# Patient Record
Sex: MALE | Race: OTHER | Employment: OTHER | URBAN - METROPOLITAN AREA
[De-identification: names, ages, dates, MRNs, and addresses within clinical notes are randomized per-mention and may not be internally consistent; named-entity substitution may affect disease eponyms.]

---

## 2017-11-03 ENCOUNTER — APPOINTMENT (OUTPATIENT)
Dept: GENERAL RADIOLOGY | Age: 72
DRG: 249 | End: 2017-11-03
Attending: EMERGENCY MEDICINE
Payer: COMMERCIAL

## 2017-11-03 ENCOUNTER — HOSPITAL ENCOUNTER (INPATIENT)
Age: 72
LOS: 4 days | Discharge: HOME | DRG: 249 | End: 2017-11-07
Attending: STUDENT IN AN ORGANIZED HEALTH CARE EDUCATION/TRAINING PROGRAM | Admitting: SPECIALIST
Payer: COMMERCIAL

## 2017-11-03 DIAGNOSIS — D50.0 IRON DEFICIENCY ANEMIA DUE TO CHRONIC BLOOD LOSS: ICD-10-CM

## 2017-11-03 DIAGNOSIS — I21.4 NSTEMI (NON-ST ELEVATED MYOCARDIAL INFARCTION) (HCC): Primary | ICD-10-CM

## 2017-11-03 DIAGNOSIS — K27.9 PUD (PEPTIC ULCER DISEASE): ICD-10-CM

## 2017-11-03 DIAGNOSIS — E11.9 TYPE 2 DIABETES MELLITUS WITHOUT COMPLICATION, WITHOUT LONG-TERM CURRENT USE OF INSULIN (HCC): ICD-10-CM

## 2017-11-03 DIAGNOSIS — D18.00 HEMANGIOMA: ICD-10-CM

## 2017-11-03 PROBLEM — R07.9 CHEST PAIN: Status: ACTIVE | Noted: 2017-11-03

## 2017-11-03 LAB
ALBUMIN SERPL-MCNC: 3.4 G/DL (ref 3.5–5)
ALBUMIN/GLOB SERPL: 0.9 {RATIO} (ref 1.1–2.2)
ALP SERPL-CCNC: 87 U/L (ref 45–117)
ALT SERPL-CCNC: 24 U/L (ref 12–78)
ANION GAP SERPL CALC-SCNC: 5 MMOL/L (ref 5–15)
APTT PPP: 28.2 SEC (ref 22.1–32.5)
AST SERPL-CCNC: 32 U/L (ref 15–37)
BASOPHILS # BLD: 0 K/UL (ref 0–0.1)
BASOPHILS NFR BLD: 0 % (ref 0–1)
BILIRUB SERPL-MCNC: 0.6 MG/DL (ref 0.2–1)
BUN SERPL-MCNC: 9 MG/DL (ref 6–20)
BUN/CREAT SERPL: 14 (ref 12–20)
CALCIUM SERPL-MCNC: 8.4 MG/DL (ref 8.5–10.1)
CHLORIDE SERPL-SCNC: 103 MMOL/L (ref 97–108)
CK SERPL-CCNC: 132 U/L (ref 39–308)
CO2 SERPL-SCNC: 31 MMOL/L (ref 21–32)
CREAT SERPL-MCNC: 0.65 MG/DL (ref 0.7–1.3)
EOSINOPHIL # BLD: 0.3 K/UL (ref 0–0.4)
EOSINOPHIL NFR BLD: 3 % (ref 0–7)
ERYTHROCYTE [DISTWIDTH] IN BLOOD BY AUTOMATED COUNT: 14.8 % (ref 11.5–14.5)
GLOBULIN SER CALC-MCNC: 4 G/DL (ref 2–4)
GLUCOSE BLD STRIP.AUTO-MCNC: 180 MG/DL (ref 65–100)
GLUCOSE SERPL-MCNC: 134 MG/DL (ref 65–100)
HCT VFR BLD AUTO: 39.2 % (ref 36.6–50.3)
HGB BLD-MCNC: 12.7 G/DL (ref 12.1–17)
INR PPP: 1.1 (ref 0.9–1.1)
LYMPHOCYTES # BLD: 3.5 K/UL (ref 0.8–3.5)
LYMPHOCYTES NFR BLD: 36 % (ref 12–49)
MCH RBC QN AUTO: 28.3 PG (ref 26–34)
MCHC RBC AUTO-ENTMCNC: 32.4 G/DL (ref 30–36.5)
MCV RBC AUTO: 87.5 FL (ref 80–99)
MONOCYTES # BLD: 0.6 K/UL (ref 0–1)
MONOCYTES NFR BLD: 6 % (ref 5–13)
NEUTS SEG # BLD: 5.3 K/UL (ref 1.8–8)
NEUTS SEG NFR BLD: 55 % (ref 32–75)
PLATELET # BLD AUTO: 298 K/UL (ref 150–400)
POTASSIUM SERPL-SCNC: 3.6 MMOL/L (ref 3.5–5.1)
PROT SERPL-MCNC: 7.4 G/DL (ref 6.4–8.2)
PROTHROMBIN TIME: 10.8 SEC (ref 9–11.1)
RBC # BLD AUTO: 4.48 M/UL (ref 4.1–5.7)
SERVICE CMNT-IMP: ABNORMAL
SODIUM SERPL-SCNC: 139 MMOL/L (ref 136–145)
THERAPEUTIC RANGE,PTTT: NORMAL SECS (ref 58–77)
TROPONIN I SERPL-MCNC: 0.67 NG/ML
WBC # BLD AUTO: 9.7 K/UL (ref 4.1–11.1)

## 2017-11-03 PROCEDURE — 82550 ASSAY OF CK (CPK): CPT | Performed by: EMERGENCY MEDICINE

## 2017-11-03 PROCEDURE — 74011250636 HC RX REV CODE- 250/636: Performed by: SPECIALIST

## 2017-11-03 PROCEDURE — 36415 COLL VENOUS BLD VENIPUNCTURE: CPT | Performed by: EMERGENCY MEDICINE

## 2017-11-03 PROCEDURE — 85730 THROMBOPLASTIN TIME PARTIAL: CPT | Performed by: STUDENT IN AN ORGANIZED HEALTH CARE EDUCATION/TRAINING PROGRAM

## 2017-11-03 PROCEDURE — 74011250637 HC RX REV CODE- 250/637: Performed by: STUDENT IN AN ORGANIZED HEALTH CARE EDUCATION/TRAINING PROGRAM

## 2017-11-03 PROCEDURE — 65660000000 HC RM CCU STEPDOWN

## 2017-11-03 PROCEDURE — 74011250637 HC RX REV CODE- 250/637: Performed by: SPECIALIST

## 2017-11-03 PROCEDURE — 85025 COMPLETE CBC W/AUTO DIFF WBC: CPT | Performed by: EMERGENCY MEDICINE

## 2017-11-03 PROCEDURE — 71020 XR CHEST PA LAT: CPT

## 2017-11-03 PROCEDURE — 82962 GLUCOSE BLOOD TEST: CPT

## 2017-11-03 PROCEDURE — 99284 EMERGENCY DEPT VISIT MOD MDM: CPT

## 2017-11-03 PROCEDURE — 85610 PROTHROMBIN TIME: CPT | Performed by: STUDENT IN AN ORGANIZED HEALTH CARE EDUCATION/TRAINING PROGRAM

## 2017-11-03 PROCEDURE — 84484 ASSAY OF TROPONIN QUANT: CPT | Performed by: EMERGENCY MEDICINE

## 2017-11-03 PROCEDURE — 93005 ELECTROCARDIOGRAM TRACING: CPT

## 2017-11-03 PROCEDURE — 80053 COMPREHEN METABOLIC PANEL: CPT | Performed by: EMERGENCY MEDICINE

## 2017-11-03 RX ORDER — HYDROCODONE BITARTRATE AND ACETAMINOPHEN 5; 325 MG/1; MG/1
1 TABLET ORAL
Status: DISCONTINUED | OUTPATIENT
Start: 2017-11-03 | End: 2017-11-07 | Stop reason: HOSPADM

## 2017-11-03 RX ORDER — LORAZEPAM 2 MG/ML
1 INJECTION INTRAMUSCULAR
Status: DISCONTINUED | OUTPATIENT
Start: 2017-11-03 | End: 2017-11-07 | Stop reason: HOSPADM

## 2017-11-03 RX ORDER — MELATONIN
DAILY
COMMUNITY
End: 2017-11-03

## 2017-11-03 RX ORDER — ASPIRIN 325 MG
325 TABLET ORAL ONCE
Status: COMPLETED | OUTPATIENT
Start: 2017-11-03 | End: 2017-11-03

## 2017-11-03 RX ORDER — MORPHINE SULFATE 10 MG/ML
4 INJECTION, SOLUTION INTRAMUSCULAR; INTRAVENOUS
Status: DISCONTINUED | OUTPATIENT
Start: 2017-11-03 | End: 2017-11-07 | Stop reason: HOSPADM

## 2017-11-03 RX ORDER — MELATONIN
1000 DAILY
Status: DISCONTINUED | OUTPATIENT
Start: 2017-11-04 | End: 2017-11-07 | Stop reason: HOSPADM

## 2017-11-03 RX ORDER — METFORMIN HYDROCHLORIDE 500 MG/1
500 TABLET ORAL
COMMUNITY

## 2017-11-03 RX ORDER — DEXTROSE 50 % IN WATER (D50W) INTRAVENOUS SYRINGE
12.5-25 AS NEEDED
Status: DISCONTINUED | OUTPATIENT
Start: 2017-11-03 | End: 2017-11-07 | Stop reason: HOSPADM

## 2017-11-03 RX ORDER — GLIPIZIDE 5 MG/1
5 TABLET, FILM COATED, EXTENDED RELEASE ORAL EVERY EVENING
Status: DISCONTINUED | OUTPATIENT
Start: 2017-11-04 | End: 2017-11-07 | Stop reason: HOSPADM

## 2017-11-03 RX ORDER — GUAIFENESIN 100 MG/5ML
81 LIQUID (ML) ORAL DAILY
Status: DISCONTINUED | OUTPATIENT
Start: 2017-11-04 | End: 2017-11-07 | Stop reason: HOSPADM

## 2017-11-03 RX ORDER — ZOLPIDEM TARTRATE 5 MG/1
5 TABLET ORAL
Status: DISCONTINUED | OUTPATIENT
Start: 2017-11-03 | End: 2017-11-07 | Stop reason: HOSPADM

## 2017-11-03 RX ORDER — INSULIN LISPRO 100 [IU]/ML
INJECTION, SOLUTION INTRAVENOUS; SUBCUTANEOUS
Status: DISCONTINUED | OUTPATIENT
Start: 2017-11-04 | End: 2017-11-07 | Stop reason: HOSPADM

## 2017-11-03 RX ORDER — ENOXAPARIN SODIUM 100 MG/ML
1 INJECTION SUBCUTANEOUS EVERY 12 HOURS
Status: COMPLETED | OUTPATIENT
Start: 2017-11-03 | End: 2017-11-04

## 2017-11-03 RX ORDER — ATORVASTATIN CALCIUM 20 MG/1
20 TABLET, FILM COATED ORAL EVERY EVENING
Status: DISCONTINUED | OUTPATIENT
Start: 2017-11-04 | End: 2017-11-07 | Stop reason: HOSPADM

## 2017-11-03 RX ORDER — SODIUM CHLORIDE 0.9 % (FLUSH) 0.9 %
5-10 SYRINGE (ML) INJECTION AS NEEDED
Status: DISCONTINUED | OUTPATIENT
Start: 2017-11-03 | End: 2017-11-06

## 2017-11-03 RX ORDER — GLIPIZIDE 5 MG/1
5 TABLET, FILM COATED, EXTENDED RELEASE ORAL EVERY EVENING
Status: DISCONTINUED | OUTPATIENT
Start: 2017-11-04 | End: 2017-11-03

## 2017-11-03 RX ORDER — SODIUM CHLORIDE 0.9 % (FLUSH) 0.9 %
5-10 SYRINGE (ML) INJECTION EVERY 8 HOURS
Status: DISCONTINUED | OUTPATIENT
Start: 2017-11-03 | End: 2017-11-06 | Stop reason: HOSPADM

## 2017-11-03 RX ORDER — METFORMIN HYDROCHLORIDE 500 MG/1
500 TABLET ORAL 4 TIMES DAILY
Status: DISCONTINUED | OUTPATIENT
Start: 2017-11-04 | End: 2017-11-05

## 2017-11-03 RX ORDER — SUCRALFATE 1 G/1
1 TABLET ORAL AS NEEDED
COMMUNITY
End: 2017-11-03

## 2017-11-03 RX ORDER — SUCRALFATE 1 G/1
1 TABLET ORAL
Status: DISCONTINUED | OUTPATIENT
Start: 2017-11-03 | End: 2017-11-07 | Stop reason: HOSPADM

## 2017-11-03 RX ORDER — SUCRALFATE 1 G/10ML
1 SUSPENSION ORAL 4 TIMES DAILY
COMMUNITY
End: 2021-02-16

## 2017-11-03 RX ORDER — ACETAMINOPHEN 325 MG/1
650 TABLET ORAL
Status: DISCONTINUED | OUTPATIENT
Start: 2017-11-03 | End: 2017-11-07 | Stop reason: HOSPADM

## 2017-11-03 RX ORDER — MAGNESIUM SULFATE 100 %
4 CRYSTALS MISCELLANEOUS AS NEEDED
Status: DISCONTINUED | OUTPATIENT
Start: 2017-11-03 | End: 2017-11-07 | Stop reason: HOSPADM

## 2017-11-03 RX ORDER — NITROGLYCERIN 0.4 MG/1
0.4 TABLET SUBLINGUAL
Status: DISCONTINUED | OUTPATIENT
Start: 2017-11-03 | End: 2017-11-07 | Stop reason: HOSPADM

## 2017-11-03 RX ADMIN — SUCRALFATE 1 G: 1 TABLET ORAL at 23:56

## 2017-11-03 RX ADMIN — Medication 10 ML: at 22:00

## 2017-11-03 RX ADMIN — ENOXAPARIN SODIUM 80 MG: 80 INJECTION SUBCUTANEOUS at 23:56

## 2017-11-03 RX ADMIN — METFORMIN HYDROCHLORIDE 500 MG: 500 TABLET, FILM COATED ORAL at 23:56

## 2017-11-03 RX ADMIN — GLIPIZIDE 5 MG: 5 TABLET, FILM COATED, EXTENDED RELEASE ORAL at 23:56

## 2017-11-03 RX ADMIN — ASPIRIN 325 MG: 325 TABLET ORAL at 18:46

## 2017-11-03 NOTE — H&P
11/3/2017    Cardiology Consult  Note   [facility] M[facility] , F.A.C.[facility].   --------PCP:-Doe Colon MD   -----Subjective:   Olive Aguilar is a 80 y.o. male   Nice gentleman from [facility] originally   Retired , wife was physician  Very nice family at bedside  DM 2 for many years, has been trying to 15,000 steps a day  Getting for 2 weeks up to walk and progresses from 2/10 to 7/10 substernal chest pain with Boyd's sign  Pain relief with rest     has a past medical history of Anemia; Cataract; Diabetes ([facility] (11/03/2017); Family history of coronary arteriosclerosis; Hemangioma; and PUD (peptic ulcer disease) (1977). family history includes Heart Attack (age of onset: 79) in his father.    ROS- complete multisystem 11 ROS done and reviewed as pertinent - cataracts, no recent bleed but hx for anemia, CP, SOB are pos also    Discussion/Plan/Impression:    Unstable angina with DM2   Positive troponin of NSTEMI  Needs cath on for 12 Noon  Use Bare metal stents due to hx of anemia  Lab Results   Component Value Date/Time    Creatinine 0.65 11/03/2017 04:55 PM      Lab Results   Component Value Date/Time    HGB 12.7 11/03/2017 04:55 PM    on sucralfate post biliroth surgery  One dose of lovenox until we know direction and magnitude of enzymes  Echo in am   Got ASA already  If chest pain at rest then nitrates, beta blocker   Start statin          Past Medical History:   Diagnosis Date    Anemia     Dr Mckayla Lopez found ooze at site of Bilroth anastomosis, was on sucralfate, saw Dr Arnel Briggs Diabetes ([facility] 11/03/2017    on metformin    Family history of coronary arteriosclerosis     Hemangioma     splenectomy-Mccormick    PUD (peptic ulcer disease) 1977    Bilroth 2        ROS-pertinents  negative except as above  The pertinent portions of the medical history,physician and nursing notes, meds,vitals , labs and Ins/Outs,are reviewed in the electronic record. Social History   Substance Use Topics    Smoking status: Never Smoker    Smokeless tobacco: Never Used    Alcohol use No      Family History   Problem Relation Age of Onset    Heart Attack Father 79      Prior to Admission Medications   Prescriptions Last Dose Informant Patient Reported? Taking? GLIPIZIDE PO 11/2/2017 at Unknown time  Yes Yes   Sig: Take  by mouth every evening. Omega-3 Fatty Acids 300 mg cap 11/2/2017 at Unknown time  Yes Yes   Sig: Take  by mouth daily. cholecalciferol (VITAMIN D3) 1,000 unit tablet 11/2/2017 at Unknown time  Yes Yes   Sig: Take  by mouth daily. metFORMIN (GLUCOPHAGE) 500 mg tablet 11/2/2017 at Unknown time  Yes Yes   Sig: Take 500 mg by mouth four (4) times daily (with meals). sucralfate (CARAFATE) 1 gram tablet 11/2/2017 at Unknown time  Yes Yes   Sig: Take 1 g by mouth as needed. Facility-Administered Medications: None     Allergies   Allergen Reactions    Sulfa (Sulfonamide Antibiotics) Hives      Objective:    Physical Exam:   Patient Vitals for the past 12 hrs:   Temp Pulse Resp BP SpO2   11/03/17 1625 97.2 °F (36.2 °C) 71 20 (!) 157/93 97 %   Physical Exam   Blood pressure (!) 157/93, pulse 71, temperature 97.2 °F (36.2 °C), resp. rate 20, height 6' 1\" (1.854 m), weight 187 lb (84.8 kg), SpO2 97 %. Constitutional:  well-developed and well-nourished. No distress. HENT: Head: Normocephalic. Eyes: No scleral icterus. Neck:  Neck supple. No JVD present. No tracheal deviation present. Pulmonary/Chest: Effort normal and breath sounds normal. No stridor. No respiratory distress, wheezes or rales. Cardiovascular: Normal rate, regular rhythm, normal heart sounds . Exam reveals no gallop and no friction rub. No murmur heard. PMI non displaced. Extremities:  no edema. Abdominal:   no abnormal distension. Neurological:  alert and oriented. Coordination seems grossly normal.   Skin: Skin is not cold. No rash noted.  Not diaphoretic. No erythema. Psychiatric:  Grossly normal mood and affect. Behavior appears normal.      Last 24hr Input/Output:  No intake or output data in the 24 hours ending 11/03/17 1912     Data Review:   Recent Results (from the past 24 hour(s))   EKG, 12 LEAD, INITIAL    Collection Time: 11/03/17  4:49 PM   Result Value Ref Range    Ventricular Rate 64 BPM    Atrial Rate 64 BPM    P-R Interval 206 ms    QRS Duration 90 ms    Q-T Interval 418 ms    QTC Calculation (Bezet) 431 ms    Calculated P Axis 75 degrees    Calculated R Axis 19 degrees    Calculated T Axis 52 degrees    Diagnosis Normal sinus rhythm  No previous ECGs available      CBC WITH AUTOMATED DIFF    Collection Time: 11/03/17  4:55 PM   Result Value Ref Range    WBC 9.7 4.1 - 11.1 K/uL    RBC 4.48 4.10 - 5.70 M/uL    HGB 12.7 12.1 - 17.0 g/dL    HCT 39.2 36.6 - 50.3 %    MCV 87.5 80.0 - 99.0 FL    MCH 28.3 26.0 - 34.0 PG    MCHC 32.4 30.0 - 36.5 g/dL    RDW 14.8 (H) 11.5 - 14.5 %    PLATELET 115 853 - 345 K/uL    NEUTROPHILS 55 32 - 75 %    LYMPHOCYTES 36 12 - 49 %    MONOCYTES 6 5 - 13 %    EOSINOPHILS 3 0 - 7 %    BASOPHILS 0 0 - 1 %    ABS. NEUTROPHILS 5.3 1.8 - 8.0 K/UL    ABS. LYMPHOCYTES 3.5 0.8 - 3.5 K/UL    ABS. MONOCYTES 0.6 0.0 - 1.0 K/UL    ABS. EOSINOPHILS 0.3 0.0 - 0.4 K/UL    ABS. BASOPHILS 0.0 0.0 - 0.1 K/UL   METABOLIC PANEL, COMPREHENSIVE    Collection Time: 11/03/17  4:55 PM   Result Value Ref Range    Sodium 139 136 - 145 mmol/L    Potassium 3.6 3.5 - 5.1 mmol/L    Chloride 103 97 - 108 mmol/L    CO2 31 21 - 32 mmol/L    Anion gap 5 5 - 15 mmol/L    Glucose 134 (H) 65 - 100 mg/dL    BUN 9 6 - 20 MG/DL    Creatinine 0.65 (L) 0.70 - 1.30 MG/DL    BUN/Creatinine ratio 14 12 - 20      GFR est AA >60 >60 ml/min/1.73m2    GFR est non-AA >60 >60 ml/min/1.73m2    Calcium 8.4 (L) 8.5 - 10.1 MG/DL    Bilirubin, total 0.6 0.2 - 1.0 MG/DL    ALT (SGPT) 24 12 - 78 U/L    AST (SGOT) 32 15 - 37 U/L    Alk.  phosphatase 87 45 - 117 U/L Protein, total 7.4 6.4 - 8.2 g/dL    Albumin 3.4 (L) 3.5 - 5.0 g/dL    Globulin 4.0 2.0 - 4.0 g/dL    A-G Ratio 0.9 (L) 1.1 - 2.2     TROPONIN I    Collection Time: 11/03/17  4:55 PM   Result Value Ref Range    Troponin-I, Qt. 0.67 (H) <0.05 ng/mL   CK W/ REFLX CKMB    Collection Time: 11/03/17  4:55 PM   Result Value Ref Range     39 - 308 U/L   PROTHROMBIN TIME + INR    Collection Time: 11/03/17  4:55 PM   Result Value Ref Range    INR 1.1 0.9 - 1.1      Prothrombin time 10.8 9.0 - 11.1 sec   PTT    Collection Time: 11/03/17  4:55 PM   Result Value Ref Range    aPTT 28.2 22.1 - 32.5 sec    aPTT, therapeutic range     58.0 - 77.0 SECS      No results found for: CHOL, CHOLX, CHLST, CHOLV, 452498, HDL, LDL, LDLC, DLDLP, TGLX, TRIGL, TRIGP, CHHD, CHHDX   Sandy Martinez MD 11/3/2017

## 2017-11-03 NOTE — ED TRIAGE NOTES
Pt arrives from PCP for LEFT sided chest pain that started 2 weeks ago. Pt reports the pain as burning that radiates up bilateral neck. Pt reports intermittent SOB.

## 2017-11-03 NOTE — IP AVS SNAPSHOT
[address] 
[phone] Patient: Evonne Henriquez MRN: [id number] :1945 My Medications TAKE these medications as instructed Instructions Each Dose to Equal  
 Morning Noon Evening Bedtime  
 aspirin 81 mg chewable tablet Your last dose was: Your next dose is: Take 1 Tab by mouth daily. 81 mg  
    
   
   
   
  
 atorvastatin 20 mg tablet Commonly known as:  LIPITOR Your last dose was: Your next dose is: Take 1 Tab by mouth every evening. 20 mg  
    
   
   
   
  
 clopidogrel 75 mg Tab Commonly known as:  PLAVIX Your last dose was: Your next dose is: Take 1 Tab by mouth daily. 75 mg  
    
   
   
   
  
 GLIPIZIDE PO Your last dose was: Your next dose is: Take 5 mg by mouth three (3) times daily. 5 mg  
    
   
   
   
  
 metFORMIN 500 mg tablet Commonly known as:  GLUCOPHAGE Your last dose was: Your next dose is: Take 500 mg by mouth four (4) times daily (with meals). 500 mg  
    
   
   
   
  
 nitroglycerin 0.4 mg SL tablet Commonly known as:  NITROSTAT Your last dose was: Your next dose is:    
   
   
 1 Tab by SubLINGual route every five (5) minutes as needed for Chest Pain. 0.4 mg Omega-3 Fatty Acids 300 mg Cap Your last dose was: Your next dose is: Take  by mouth daily. sucralfate 100 mg/mL suspension Commonly known as:  Radha Chauncey Your last dose was: Your next dose is: Take 1 g by mouth four (4) times daily. 1 g Where to Get Your Medications Information on where to get these meds will be given to you by the nurse or doctor. ! Ask your nurse or doctor about these medications atorvastatin 20 mg tablet  
 clopidogrel 75 mg Tab  
 nitroglycerin 0.4 mg SL tablet

## 2017-11-03 NOTE — ED PROVIDER NOTES
HPI Comments: 80 y.o. male with past medical history significant for DM and PUD who presents to the ED with chief complaint of chest pain. Patient reports occasional \"burning\" mid chest pain that radiates to his right chest and neck and SOB with walking with onset ~2 weeks; describes his pain as a \"7-8/10\" at onset. Patient reports needing to stop walking due to his pain; reports his pain improves with rest. Patient reports being seen by his PCP for his symptoms \"earlier today,\" reports being referred to the ED for further evaluation. Patient reports a history of DM. Patient denies a history of HTN. Patient reports a history of a partial gastrectomy due to an ulcer ~40 years ago. Patient reports a history of a splenectomy due to a hemangioma ~20 years ago. Patient reports a history of anemia; reports being diagnosed with \"internal microbleeding\" near the site of his splenectomy via a pill cam ~14 years ago. Patient denies fever, cough, abdominal pain, bleeding, and melena. There are no other acute medical concerns at this time. PCP: Aixa Nelson MD    Note written by All Sims, as dictated by Alyssa Stanley MD 5:31 PM     The history is provided by the patient. Past Medical History:   Diagnosis Date    Diabetes ([facility]     PUD (peptic ulcer disease)        Past Surgical History:   Procedure Laterality Date    HX OTHER SURGICAL      splenectomy         History reviewed. No pertinent family history. Social History     Social History    Marital status:      Spouse name: N/A    Number of children: N/A    Years of education: N/A     Occupational History    Not on file.      Social History Main Topics    Smoking status: Never Smoker    Smokeless tobacco: Never Used    Alcohol use No    Drug use: No    Sexual activity: Not on file     Other Topics Concern    Not on file     Social History Narrative    No narrative on file         ALLERGIES: Sulfa (sulfonamide antibiotics)    Review of Systems   Constitutional: Negative for chills and fever. HENT: Negative for sore throat. Eyes: Negative for pain. Respiratory: Positive for shortness of breath. Negative for cough. Cardiovascular: Positive for chest pain. Gastrointestinal: Negative for abdominal pain, blood in stool and vomiting. Genitourinary: Negative for dysuria. Skin: Negative for rash. Neurological: Negative for syncope and headaches. Psychiatric/Behavioral: Negative for confusion. All other systems reviewed and are negative. Vitals:    11/03/17 1625   BP: (!) 157/93   Pulse: 71   Resp: 20   Temp: 97.2 °F (36.2 °C)   SpO2: 97%   Weight: 84.8 kg (187 lb)   Height: 6' 1\" (1.854 m)            Physical Exam   Constitutional: He is oriented to person, place, and time. He appears well-developed. No distress. HENT:   Head: Normocephalic and atraumatic. Eyes: Conjunctivae and EOM are normal.   Neck: Normal range of motion. Neck supple. Cardiovascular: Normal rate, regular rhythm and normal heart sounds. No murmur heard. Pulmonary/Chest: Effort normal and breath sounds normal. No respiratory distress. Abdominal: Soft. Bowel sounds are normal. He exhibits no distension. There is no tenderness. There is no rebound. Musculoskeletal: Normal range of motion. He exhibits no edema or tenderness. Neurological: He is alert and oriented to person, place, and time. No cranial nerve deficit. He exhibits normal muscle tone. Coordination normal.   Skin: Skin is warm and dry. Nursing note and vitals reviewed.   Note written by All Ramirez, as dictated by Esequiel Smith MD 5:42 PM      MDM  Number of Diagnoses or Management Options  NSTEMI (non-ST elevated myocardial infarction) [facility]): new and requires workup     Amount and/or Complexity of Data Reviewed  Clinical lab tests: ordered and reviewed  Tests in the radiology section of CPT®: ordered and reviewed  Tests in the medicine section of CPT®: ordered  Obtain history from someone other than the patient: yes  Discuss the patient with other providers: yes  Independent visualization of images, tracings, or specimens: yes    Risk of Complications, Morbidity, and/or Mortality  Presenting problems: high  Diagnostic procedures: high  Management options: high    Critical Care  Total time providing critical care: 30-74 minutes (Total critical care time spend exclusive of procedures:  30 minutes.  )    ED Course       Procedures     ED EKG interpretation:  Rhythm: normal sinus rhythm; and regular . Rate (approx.): 64 bpm; Axis: normal; ST/T wave: normal; normal intervals; No STEMI. Note written by All Caba, as dictated by Conchis Bowden MD 4:49 PM    CONSULT NOTE:  5:57 PM Conchis Bowden MD spoke with Dr. Melina Phillips MD, Consult for Cardiology. Discussed available diagnostic tests and clinical findings. Provider is in agreement with care plans as outlined. Dr. Melina Phillips MD will see the patient. PROGRESS NOTE:  7:06 PM  Dr. Melina Phillips MD has evaluated the patient. He will admit the patient to their service.

## 2017-11-03 NOTE — IP AVS SNAPSHOT
[address] [facility] [address] [place] [address] 
[phone] Patient: Jenny Cuevas MRN: [id number] :1945 About your hospitalization You were admitted on:  November 3, 2017 You last received care in the:  [facility] You were discharged on:  2017 Why you were hospitalized Your primary diagnosis was:  Not on File Your diagnoses also included:  Chest Pain Things You Need To Do (next 8 weeks)  Follow up with Pamella Rangel, [facility] follow up PCP appointment on Thursday, 17 @ 10:15 a.m. Phone:  [phone] Where:  [address], [address], [address]  ESTABLISHED PATIENT with Robles Cosme PA-C at  2:20 PM  
Where:  [address] ([facility]) Discharge Orders None A check argentina indicates which time of day the medication should be taken. My Medications TAKE these medications as instructed Instructions Each Dose to Equal  
 Morning Noon Evening Bedtime  
 aspirin 81 mg chewable tablet Your last dose was: Your next dose is: Take 1 Tab by mouth daily. 81 mg  
    
   
   
   
  
 atorvastatin 20 mg tablet Commonly known as:  LIPITOR Your last dose was: Your next dose is: Take 1 Tab by mouth every evening. 20 mg  
    
   
   
   
  
 clopidogrel 75 mg Tab Commonly known as:  PLAVIX Your last dose was: Your next dose is: Take 1 Tab by mouth daily. 75 mg  
    
   
   
   
  
 GLIPIZIDE PO Your last dose was: Your next dose is: Take 5 mg by mouth three (3) times daily. 5 mg  
    
   
   
   
  
 metFORMIN 500 mg tablet Commonly known as:  GLUCOPHAGE Your last dose was: Your next dose is: Take 500 mg by mouth four (4) times daily (with meals). 500 mg  
    
   
   
   
  
 nitroglycerin 0.4 mg SL tablet Commonly known as:  NITROSTAT Your last dose was: Your next dose is:    
   
   
 1 Tab by SubLINGual route every five (5) minutes as needed for Chest Pain. 0.4 mg Omega-3 Fatty Acids 300 mg Cap Your last dose was: Your next dose is: Take  by mouth daily. sucralfate 100 mg/mL suspension Commonly known as:  Stanislav Garbe Your last dose was: Your next dose is: Take 1 g by mouth four (4) times daily. 1 g Where to Get Your Medications Information on where to get these meds will be given to you by the nurse or doctor. ! Ask your nurse or doctor about these medications  
  atorvastatin 20 mg tablet  
 clopidogrel 75 mg Tab  
 nitroglycerin 0.4 mg SL tablet Discharge Instructions None [employer] Announcement We are excited to announce that we are making your provider's discharge notes available to you in [employer]. You will see these notes when they are completed and signed by the physician that discharged you from your recent hospital stay. If you have any questions or concerns about any information you see in [employer], please call the Health Information Department where you were seen or reach out to your Primary Care Provider for more information about your plan of care. Introducing [facility]! [employer] introduces [employer] patient portal. Now you can access parts of your medical record, email your doctor's office, and request medication refills online. 1. In your internet browser, go to [email or web address] 2. Click on the First Time User? Click Here link in the Sign In box. You will see the New Member Sign Up page. 3. Enter your [employer] Access Code exactly as it appears below. You will not need to use this code after youve completed the sign-up process. If you do not sign up before the expiration date, you must request a new code. · [employer] Access Code: [email or web address] Expires: 2/4/2018  7:12 AM 
 
4. Enter the last four digits of your Social Security Number (xxxx) and Date of Birth (mm/dd/yyyy) as indicated and click Submit. You will be taken to the next sign-up page. 5. Create a [employer] ID. This will be your [employer] login ID and cannot be changed, so think of one that is secure and easy to remember. 6. Create a [employer] password. You can change your password at any time. 7. Enter your Password Reset Question and Answer. This can be used at a later time if you forget your password. 8. Enter your e-mail address. You will receive e-mail notification when new information is available in [address] 9. Click Sign Up. You can now view and download portions of your medical record. 10. Click the Download Summary menu link to download a portable copy of your medical information. If you have questions, please visit the Frequently Asked Questions section of the [employer] website. Remember, [employer] is NOT to be used for urgent needs. For medical emergencies, dial 911. Now available from your iPhone and Android! Unresulted Labs-Please follow up with your PCP about these lab tests Order Current Status ECG RHYTHM ANALYSIS ADULT Preliminary result Providers Seen During Your Hospitalization Provider Specialty Primary office phone Dania Flower MD Emergency Medicine [phone] Nathalie Wiggins MD Cardiology [phone] Immunizations Administered for This Admission Name Date Influenza Vaccine (Quad) PF 11/7/2017 Your Primary Care Physician (PCP) Primary Care Physician Office Phone Office Fax Cinthia Colvin [phone] You are allergic to the following Allergen Reactions Sulfa (Sulfonamide Antibiotics) Hives Recent Documentation Height Weight BMI Smoking Status 1.854 m 79.8 kg 23.22 kg/m2 Never Smoker Emergency Contacts Name Discharge Info Relation Home Work Mobile Katie Pearson DISCHARGE CAREGIVER [3] Spouse [3] [phone] Nicko Stubbs CAREGIVER [3] Daughter [21] [phone] Patient Belongings The following personal items are in your possession at time of discharge: 
  Dental Appliances: None  Visual Aid: None      Home Medications: None   Jewelry: With patient  Clothing: With patient    Other Valuables: Cell Phone Please provide this summary of care documentation to your next provider. Signatures-by signing, you are acknowledging that this After Visit Summary has been reviewed with you and you have received a copy. Patient Signature:  ____________________________________________________________ Date:  ____________________________________________________________  
  
Kamaljit Kj Provider Signature:  ____________________________________________________________ Date:  ____________________________________________________________

## 2017-11-04 LAB
ATRIAL RATE: 64 BPM
CALCULATED P AXIS, ECG09: 75 DEGREES
CALCULATED R AXIS, ECG10: 19 DEGREES
CALCULATED T AXIS, ECG11: 52 DEGREES
CHOLEST SERPL-MCNC: 212 MG/DL
DIAGNOSIS, 93000: NORMAL
EST. AVERAGE GLUCOSE BLD GHB EST-MCNC: 189 MG/DL
GLUCOSE BLD STRIP.AUTO-MCNC: 101 MG/DL (ref 65–100)
GLUCOSE BLD STRIP.AUTO-MCNC: 101 MG/DL (ref 65–100)
GLUCOSE BLD STRIP.AUTO-MCNC: 105 MG/DL (ref 65–100)
GLUCOSE BLD STRIP.AUTO-MCNC: 109 MG/DL (ref 65–100)
GLUCOSE BLD STRIP.AUTO-MCNC: 87 MG/DL (ref 65–100)
HBA1C MFR BLD: 8.2 % (ref 4.2–6.3)
HDLC SERPL-MCNC: 62 MG/DL
HDLC SERPL: 3.4 {RATIO} (ref 0–5)
LDLC SERPL CALC-MCNC: 131.4 MG/DL (ref 0–100)
LIPID PROFILE,FLP: ABNORMAL
P-R INTERVAL, ECG05: 206 MS
Q-T INTERVAL, ECG07: 418 MS
QRS DURATION, ECG06: 90 MS
QTC CALCULATION (BEZET), ECG08: 431 MS
SERVICE CMNT-IMP: ABNORMAL
SERVICE CMNT-IMP: NORMAL
TRIGL SERPL-MCNC: 93 MG/DL (ref ?–150)
TROPONIN I SERPL-MCNC: 0.67 NG/ML
VENTRICULAR RATE, ECG03: 64 BPM
VLDLC SERPL CALC-MCNC: 18.6 MG/DL

## 2017-11-04 PROCEDURE — 93306 TTE W/DOPPLER COMPLETE: CPT

## 2017-11-04 PROCEDURE — 82962 GLUCOSE BLOOD TEST: CPT

## 2017-11-04 PROCEDURE — 83036 HEMOGLOBIN GLYCOSYLATED A1C: CPT | Performed by: SPECIALIST

## 2017-11-04 PROCEDURE — 93306 TTE W/DOPPLER COMPLETE: CPT | Performed by: SPECIALIST

## 2017-11-04 PROCEDURE — 74011250637 HC RX REV CODE- 250/637: Performed by: SPECIALIST

## 2017-11-04 PROCEDURE — 36415 COLL VENOUS BLD VENIPUNCTURE: CPT | Performed by: SPECIALIST

## 2017-11-04 PROCEDURE — 65660000000 HC RM CCU STEPDOWN

## 2017-11-04 PROCEDURE — 80061 LIPID PANEL: CPT | Performed by: SPECIALIST

## 2017-11-04 PROCEDURE — 74011250636 HC RX REV CODE- 250/636: Performed by: SPECIALIST

## 2017-11-04 PROCEDURE — 84484 ASSAY OF TROPONIN QUANT: CPT | Performed by: SPECIALIST

## 2017-11-04 RX ADMIN — METFORMIN HYDROCHLORIDE 500 MG: 500 TABLET, FILM COATED ORAL at 17:20

## 2017-11-04 RX ADMIN — Medication 10 ML: at 06:00

## 2017-11-04 RX ADMIN — SUCRALFATE 1 G: 1 TABLET ORAL at 17:21

## 2017-11-04 RX ADMIN — METFORMIN HYDROCHLORIDE 500 MG: 500 TABLET, FILM COATED ORAL at 09:07

## 2017-11-04 RX ADMIN — METFORMIN HYDROCHLORIDE 500 MG: 500 TABLET, FILM COATED ORAL at 21:02

## 2017-11-04 RX ADMIN — VITAMIN D, TAB 1000IU (100/BT) 1000 UNITS: 25 TAB at 09:07

## 2017-11-04 RX ADMIN — SUCRALFATE 1 G: 1 TABLET ORAL at 10:48

## 2017-11-04 RX ADMIN — ASPIRIN 81 MG 81 MG: 81 TABLET ORAL at 09:07

## 2017-11-04 RX ADMIN — Medication 10 ML: at 22:00

## 2017-11-04 RX ADMIN — METFORMIN HYDROCHLORIDE 500 MG: 500 TABLET, FILM COATED ORAL at 12:52

## 2017-11-04 RX ADMIN — ATORVASTATIN CALCIUM 20 MG: 20 TABLET, FILM COATED ORAL at 17:21

## 2017-11-04 RX ADMIN — Medication 1 CAPSULE: at 09:07

## 2017-11-04 RX ADMIN — SUCRALFATE 1 G: 1 TABLET ORAL at 21:02

## 2017-11-04 RX ADMIN — GLIPIZIDE 5 MG: 5 TABLET, FILM COATED, EXTENDED RELEASE ORAL at 17:20

## 2017-11-04 RX ADMIN — SUCRALFATE 1 G: 1 TABLET ORAL at 06:46

## 2017-11-04 RX ADMIN — ENOXAPARIN SODIUM 80 MG: 80 INJECTION SUBCUTANEOUS at 09:07

## 2017-11-04 RX ADMIN — Medication 10 ML: at 14:00

## 2017-11-04 NOTE — PROGRESS NOTES
[facility]  -Progress Note     Evaneosmarcel Crowe [phone]   11/4/2017      Kristen Diop M.D. , F.A.C.C.   --------PCP:-Doe Carmona MD   -----Subjective:   . Marshall Reasoner Marshall Reasoner Marshall Reasoner Ruby Rolle is a 80 y.o. male   Comfortable   Resting comfortably  No complaints  Denies chest pain, heart palpitations , increasing edema, pre-syncope or shortness of breath at rest   No problems overnight, rhythm and hemodynamics stable -see vitals below     Patient Vitals for the past 12 hrs:   Temp Pulse Resp BP SpO2   11/04/17 0355 97.9 °F (36.6 °C) (!) 59 17 125/63 97 %   11/03/17 2258 97.4 °F (36.3 °C) 64 18 133/68 96 %   11/03/17 2119 - - - - 96 %   11/03/17 2109 97.3 °F (36.3 °C) 62 16 142/69 95 %   11/03/17 2000 - 69 15 140/69 97 %   11/03/17 1945 - 62 15 148/79 97 %   11/03/17 1917 - 63 16 157/85 99 %        Discussion/Plans/Recs   [place],NSTEMI with recent increasing crescendo chest pain CCS 3  Troponin second is flat  Wife concerned over metformin and lovenox, I attempted last night to speak with her by phone from floor, but she left and told nurse she was fine, patient this am reports he is fine with plans  Will continue metformin now until Sunday am  Will not need lovenox beyond the ordered two doses if enzymes remains flat and pt is pain free  DM2-SSi if needed, continue glyburide  Bp stable  ASA, statin, cath on Monday at noon  LDL today is 131 -have already started statin     Cardiac Studies/Hx:  No specialty comments available.          Past Medical History:   Diagnosis Date    Anemia     Dr Fran Rowan found ooze at site of Bilroth anastomosis, was on sucralfate, saw Dr Fly Varghese Diabetes [facility]) 11/03/2017    on metformin    Family history of coronary arteriosclerosis     Hemangioma     splenectomy-Mccormick    PUD (peptic ulcer disease) 1977    Bilroth 2      ROS-pertinents  negative except as above  The pertinent portions of the medical history,physician and nursing notes, meds,vitals , labs and Ins/Outs,are reviewed in the electronic record.     Results for orders placed or performed during the hospital encounter of 11/03/17   EKG, 12 LEAD, INITIAL   Result Value Ref Range    Ventricular Rate 64 BPM    Atrial Rate 64 BPM    P-R Interval 206 ms    QRS Duration 90 ms    Q-T Interval 418 ms    QTC Calculation (Bezet) 431 ms    Calculated P Axis 75 degrees    Calculated R Axis 19 degrees    Calculated T Axis 52 degrees    Diagnosis       Normal sinus rhythm  No previous ECGs available  Confirmed by Zeke Cabrera MD. ([id number]) on 11/4/2017 4:22:13 AM        Vitals:    11/03/17 2109 11/03/17 2119 11/03/17 2258 11/04/17 0355   BP: 142/69  133/68 125/63   BP 1 Location: Right arm  Right arm Right arm   BP Patient Position: At rest  At rest At rest   Pulse: 62  64 (!) 59   Resp: 16  18 17   Temp: 97.3 °F (36.3 °C)  97.4 °F (36.3 °C) 97.9 °F (36.6 °C)   SpO2: 95% 96% 96% 97%   Weight: 184 lb 4.9 oz (83.6 kg)   184 lb 4.9 oz (83.6 kg)   Height:           Objective:    Physical Exam:   Patient Vitals for the past 12 hrs:   Temp Pulse Resp BP SpO2   11/04/17 0355 97.9 °F (36.6 °C) (!) 59 17 125/63 97 %   11/03/17 2258 97.4 °F (36.3 °C) 64 18 133/68 96 %   11/03/17 2119 - - - - 96 %   11/03/17 2109 97.3 °F (36.3 °C) 62 16 142/69 95 %   11/03/17 2000 - 69 15 140/69 97 %   11/03/17 1945 - 62 15 148/79 97 %   11/03/17 1917 - 63 16 157/85 99 %      General:  alert, cooperative, no distress, appears stated age   ENT, Neck:  no jvd   Chest Wall: inspection normal - no chest wall deformities or tenderness, respiratory effort normal   Lung: clear to auscultation bilaterally   Heart:  normal rate, regular rhythm, normal S1, S2, no murmurs, rubs, clicks or gallops   Abdomen: nondistended   Extremities: extremities normal, atraumatic, no cyanosis or edema     Last 24hr Input/Output:  No intake or output data in the 24 hours ending 11/04/17 6731     Data Review:   Recent Results (from the past 24 hour(s)) EKG, 12 LEAD, INITIAL    Collection Time: 11/03/17  4:49 PM   Result Value Ref Range    Ventricular Rate 64 BPM    Atrial Rate 64 BPM    P-R Interval 206 ms    QRS Duration 90 ms    Q-T Interval 418 ms    QTC Calculation (Bezet) 431 ms    Calculated P Axis 75 degrees    Calculated R Axis 19 degrees    Calculated T Axis 52 degrees    Diagnosis       Normal sinus rhythm  No previous ECGs available  Confirmed by Chilo White MD. ([id number]) on 11/4/2017 4:22:13 AM     CBC WITH AUTOMATED DIFF    Collection Time: 11/03/17  4:55 PM   Result Value Ref Range    WBC 9.7 4.1 - 11.1 K/uL    RBC 4.48 4.10 - 5.70 M/uL    HGB 12.7 12.1 - 17.0 g/dL    HCT 39.2 36.6 - 50.3 %    MCV 87.5 80.0 - 99.0 FL    MCH 28.3 26.0 - 34.0 PG    MCHC 32.4 30.0 - 36.5 g/dL    RDW 14.8 (H) 11.5 - 14.5 %    PLATELET 244 143 - 607 K/uL    NEUTROPHILS 55 32 - 75 %    LYMPHOCYTES 36 12 - 49 %    MONOCYTES 6 5 - 13 %    EOSINOPHILS 3 0 - 7 %    BASOPHILS 0 0 - 1 %    ABS. NEUTROPHILS 5.3 1.8 - 8.0 K/UL    ABS. LYMPHOCYTES 3.5 0.8 - 3.5 K/UL    ABS. MONOCYTES 0.6 0.0 - 1.0 K/UL    ABS. EOSINOPHILS 0.3 0.0 - 0.4 K/UL    ABS. BASOPHILS 0.0 0.0 - 0.1 K/UL   METABOLIC PANEL, COMPREHENSIVE    Collection Time: 11/03/17  4:55 PM   Result Value Ref Range    Sodium 139 136 - 145 mmol/L    Potassium 3.6 3.5 - 5.1 mmol/L    Chloride 103 97 - 108 mmol/L    CO2 31 21 - 32 mmol/L    Anion gap 5 5 - 15 mmol/L    Glucose 134 (H) 65 - 100 mg/dL    BUN 9 6 - 20 MG/DL    Creatinine 0.65 (L) 0.70 - 1.30 MG/DL    BUN/Creatinine ratio 14 12 - 20      GFR est AA >60 >60 ml/min/1.73m2    GFR est non-AA >60 >60 ml/min/1.73m2    Calcium 8.4 (L) 8.5 - 10.1 MG/DL    Bilirubin, total 0.6 0.2 - 1.0 MG/DL    ALT (SGPT) 24 12 - 78 U/L    AST (SGOT) 32 15 - 37 U/L    Alk.  phosphatase 87 45 - 117 U/L    Protein, total 7.4 6.4 - 8.2 g/dL    Albumin 3.4 (L) 3.5 - 5.0 g/dL    Globulin 4.0 2.0 - 4.0 g/dL    A-G Ratio 0.9 (L) 1.1 - 2.2     TROPONIN I    Collection Time: 11/03/17  4:55 PM Result Value Ref Range    Troponin-I, Qt. 0.67 (H) <0.05 ng/mL   CK W/ REFLX CKMB    Collection Time: 11/03/17  4:55 PM   Result Value Ref Range     39 - 308 U/L   PROTHROMBIN TIME + INR    Collection Time: 11/03/17  4:55 PM   Result Value Ref Range    INR 1.1 0.9 - 1.1      Prothrombin time 10.8 9.0 - 11.1 sec   PTT    Collection Time: 11/03/17  4:55 PM   Result Value Ref Range    aPTT 28.2 22.1 - 32.5 sec    aPTT, therapeutic range     58.0 - 77.0 SECS   GLUCOSE, POC    Collection Time: 11/03/17  9:15 PM   Result Value Ref Range    Glucose (POC) 180 (H) 65 - 100 mg/dL    Performed by Gillian Sibley    HEMOGLOBIN A1C WITH EAG    Collection Time: 11/04/17 12:41 AM   Result Value Ref Range    Hemoglobin A1c 8.2 (H) 4.2 - 6.3 %    Est. average glucose 189 mg/dL   TROPONIN I    Collection Time: 11/04/17 12:53 AM   Result Value Ref Range    Troponin-I, Qt. 0.67 (H) <0.05 ng/mL   LIPID PANEL    Collection Time: 11/04/17 12:55 AM   Result Value Ref Range    LIPID PROFILE          Cholesterol, total 212 (H) <200 MG/DL    Triglyceride 93 <150 MG/DL    HDL Cholesterol 62 MG/DL    LDL, calculated 131.4 (H) 0 - 100 MG/DL    VLDL, calculated 18.6 MG/DL    CHOL/HDL Ratio 3.4 0 - 5.0        Bhavik Moncada MD 11/4/2017

## 2017-11-04 NOTE — PROGRESS NOTES
Problem: Unstable angina/NSTEMI: Day of Admission/Day 1  Goal: Psychosocial  Outcome: Progressing Towards Goal  Patient has no complaints of pain, anxiety, or shortness of breath. Will continue to monitor closely. Bedside and Verbal shift change report given to Kwasi Valdez (oncoming nurse) by Dar Taylor (offgoing nurse). Report included the following information SBAR, Kardex, Intake/Output, Recent Results and Cardiac Rhythm NSR.

## 2017-11-04 NOTE — PROGRESS NOTES
Pt received. Up at [facility], duel assessment showed no skin damage whatsoever. Done with Lysndsy. Pt has slight language barrier, speaks english well, heavy accent. Pt family slightly vigilant. Demanded to speak to Doc, he was paged and was willing to speak to her, wife and daughter had already left. Pt eventually accepted explanations for medications due. Informed pt taking at home meds without clearing with pharmacy is prohibited.   Stephanie Dago

## 2017-11-04 NOTE — PROGRESS NOTES
Bedside, Verbal and Written shift change report given to Rosa Trevino (oncoming nurse) by Ceasar Ro (offgoing nurse). Report included the following information SBAR, Kardex, Procedure Summary, Intake/Output, MAR, Recent Results and Med Rec Status   Leonides April.

## 2017-11-04 NOTE — PROGRESS NOTES
Admission Medication Reconciliation:    Information obtained from: Wife (physician), patient, [employer]    Significant PMH/Disease States:   Past Medical History:   Diagnosis Date    Anemia     Dr Melody Melendez found ooze at site of Bilroth anastomosis, was on sucralfate, [facility] 11/03/2017    on metformin    Family history of coronary arteriosclerosis     Hemangioma     splenectomy-Mccormick    PUD (peptic ulcer disease) 1977    Bilroth 2       Chief Complaint for this Admission:  Chest pain    Allergies:  Sulfa (sulfonamide antibiotics)    Prior to Admission Medications:   Prior to Admission Medications   Prescriptions Last Dose Informant Patient Reported? Taking? GLIPIZIDE PO 11/3/2017 at Unknown time  Yes Yes   Sig: Take 5 mg by mouth three (3) times daily. Omega-3 Fatty Acids 300 mg cap 11/2/2017 at Unknown time  Yes Yes   Sig: Take  by mouth daily. metFORMIN (GLUCOPHAGE) 500 mg tablet 11/2/2017 at Unknown time  Yes Yes   Sig: Take 500 mg by mouth four (4) times daily (with meals). sucralfate (CARAFATE) 100 mg/mL suspension 10/27/2017 at Unknown time  Yes Yes   Sig: Take 1 g by mouth four (4) times daily. Facility-Administered Medications: None         Comments/Recommendations: Wife (Galina Cruz) is an excellent historian, as is patient. Allergies were reviewed and confirmed as SULFA which causes HIVES. Please note:  1. Glipizide: patient titrates according to his blood sugar levels (\"sometimes once daily, sometimes twice, maybe more\")  2. Sucralfate: patient has a long history of anemia and bleeding, GI surgeries many years ago. Sucralfate was prescribed to help   \"prevent anemia. \" Because he is very concerned about his blood sugar, he bases decisions on how much sucralfate to take based on blood sugar (\"if too high I don't take because it has sugar in it\"). 3. Metformin: prescription is to take 1 AM, 1 PM, 2 QHS.  Patient has opted to take 1 tab QID as \" I know what is best for my blood sugar. \"  4. Per wife, Patient occasionally takes ferrous sulfate, cholecalciferol and O3FA but not \"always regularly\". Left on list for completeness. Revised:  1. Glipizide (reflects prescription, please see note for how he takes)  2. Sucralfate (from tablet to liquid)    Attempting to reach prescriber to update Raulito Metzger, Cardiology). Thank you for allowing me to participate in the care of your patient.     Jem Salcedo PharmD, RN #[id number]

## 2017-11-04 NOTE — ED NOTES
TRANSFER - OUT REPORT:    Verbal report given to Guanakito Ya (name) [facility] (unit) for routine progression of care       Report consisted of patients Situation, Background, Assessment and   Recommendations(SBAR). Information from the following report(s) SBAR and ED Summary was reviewed with the receiving nurse. Lines:   Peripheral IV 11/03/17 Left Antecubital (Active)   Site Assessment Clean, dry, & intact 11/3/2017  4:59 PM   Phlebitis Assessment 0 11/3/2017  4:59 PM   Infiltration Assessment 0 11/3/2017  4:59 PM   Dressing Status Clean, dry, & intact 11/3/2017  4:59 PM   Dressing Type Tape;Transparent 11/3/2017  4:59 PM   Hub Color/Line Status Pink;Flushed;Patent 11/3/2017  4:59 PM   Action Taken Blood drawn 11/3/2017  4:59 PM        Opportunity for questions and clarification was provided.       Patient transported with:   Moku

## 2017-11-04 NOTE — CARDIO/PULMONARY
Cardiac Rehab: MI education folder, with catheterization brochure, to bedside of Christiano Pearson. Wife at bedside. Pt is for a catheterization on Monday. Wife questions echo today. Echo is pending, deferred to medical team.                                               Educated using teach back method. Reviewed MI diagnosis definition and purpose of intervention. Discussed risk factors for CAD to include the following: family history,  Diabetes. Discussed Heart Healthy/Low Sodium (2000 mg) diet. He is very attentive to his diet. Discussed follow up appointments with cardiologist. Emphasized the value of cardiac rehab. Discussed Cardiac Rehab Program format, benefits, and encouraged enrollment to assist with risk modification and management. He states he is very unhappy if he cannot walk as he walks everyday and says it helps with keeping his sugars level. He asked several questions about cardiac rehab. Reviewed relaxation methods of deep breathing as pt says he is just focused on Monday's procedure and going over it on his mind. Jenny Stands and his wife verbalized understanding with questions answered.   Lorinda Dandy, RN

## 2017-11-04 NOTE — PROGRESS NOTES
Problem: Diabetes Self-Management  Goal: *Incorporating physical activity into lifestyle  Outcome: Progressing Towards Goal  Pt walks frequently with wife. Comments: Pt understands importance of excersize and makes regular attempts to excersize to help maintain therapeutic BS levels.   Mauricio Khan

## 2017-11-05 LAB
GLUCOSE BLD STRIP.AUTO-MCNC: 120 MG/DL (ref 65–100)
GLUCOSE BLD STRIP.AUTO-MCNC: 129 MG/DL (ref 65–100)
GLUCOSE BLD STRIP.AUTO-MCNC: 135 MG/DL (ref 65–100)
GLUCOSE BLD STRIP.AUTO-MCNC: 146 MG/DL (ref 65–100)
SERVICE CMNT-IMP: ABNORMAL

## 2017-11-05 PROCEDURE — 82962 GLUCOSE BLOOD TEST: CPT

## 2017-11-05 PROCEDURE — 74011250637 HC RX REV CODE- 250/637: Performed by: SPECIALIST

## 2017-11-05 PROCEDURE — 65660000000 HC RM CCU STEPDOWN

## 2017-11-05 RX ADMIN — Medication 10 ML: at 21:27

## 2017-11-05 RX ADMIN — Medication 10 ML: at 06:00

## 2017-11-05 RX ADMIN — NITROGLYCERIN 0.4 MG: 0.4 TABLET SUBLINGUAL at 09:10

## 2017-11-05 RX ADMIN — ASPIRIN 81 MG 81 MG: 81 TABLET ORAL at 08:11

## 2017-11-05 RX ADMIN — ATORVASTATIN CALCIUM 20 MG: 20 TABLET, FILM COATED ORAL at 17:38

## 2017-11-05 RX ADMIN — VITAMIN D, TAB 1000IU (100/BT) 1000 UNITS: 25 TAB at 08:11

## 2017-11-05 RX ADMIN — GLIPIZIDE 5 MG: 5 TABLET, FILM COATED, EXTENDED RELEASE ORAL at 17:38

## 2017-11-05 RX ADMIN — Medication 1 CAPSULE: at 08:11

## 2017-11-05 NOTE — PROGRESS NOTES
Problem: Falls - Risk of  Goal: *Absence of Falls  Document Neyda Fall Risk and appropriate interventions in the flowsheet.    Outcome: Progressing Towards Goal  Fall Risk Interventions:            Medication Interventions: Teach patient to arise slowly, Patient to call before getting OOB                  Comments: Pt has remained free from falls

## 2017-11-05 NOTE — PROGRESS NOTES
Bedside, Verbal and Written shift change report given to Abigail (oncoming nurse) by Aneta Shelton (offgoing nurse). Report included the following information SBAR, Kardex, Intake/Output, MAR, Recent Results and Med Rec Status   Arvid Pandy METFORMIN<  SEE MAR. The beginning of Daylight Saving Time occurred at 0200 hrs. Documentation of patient care and medications administered is done with respect to the time change.

## 2017-11-05 NOTE — PROGRESS NOTES
Problem: Unstable angina/NSTEMI: Day 2  Goal: Nutrition/Diet  Outcome: Progressing Towards Goal  Patient eating cardiac diet well, will continue to monitor.

## 2017-11-05 NOTE — PROGRESS NOTES
Problem: Diabetes Self-Management  Goal: *Incorporating nutritional management into lifestyle  Describe effect of type, amount and timing of food on blood glucose; list 3 methods for planning meals. Outcome: Progressing Towards Goal  Pt understans the importance of balancing complx carbs and proteins to help maintain healthy target BS. PT will adhere to medication regime and remain within target. .     Comments: Pt understans the importance of balancing complx carbs and proteins to help maintain healthy target BS.    PT will adhere to medication regime and remain within target

## 2017-11-06 LAB
GLUCOSE BLD STRIP.AUTO-MCNC: 130 MG/DL (ref 65–100)
GLUCOSE BLD STRIP.AUTO-MCNC: 136 MG/DL (ref 65–100)
GLUCOSE BLD STRIP.AUTO-MCNC: 96 MG/DL (ref 65–100)
SERVICE CMNT-IMP: ABNORMAL
SERVICE CMNT-IMP: ABNORMAL
SERVICE CMNT-IMP: NORMAL

## 2017-11-06 PROCEDURE — 74011250637 HC RX REV CODE- 250/637

## 2017-11-06 PROCEDURE — 74011250636 HC RX REV CODE- 250/636: Performed by: SPECIALIST

## 2017-11-06 PROCEDURE — 74011000258 HC RX REV CODE- 258: Performed by: SPECIALIST

## 2017-11-06 PROCEDURE — 4A023N7 MEASUREMENT OF CARDIAC SAMPLING AND PRESSURE, LEFT HEART, PERCUTANEOUS APPROACH: ICD-10-PCS | Performed by: SPECIALIST

## 2017-11-06 PROCEDURE — 77030013715 HC INFL SYS MRTM -B

## 2017-11-06 PROCEDURE — 02703DZ DILATION OF CORONARY ARTERY, ONE ARTERY WITH INTRALUMINAL DEVICE, PERCUTANEOUS APPROACH: ICD-10-PCS | Performed by: SPECIALIST

## 2017-11-06 PROCEDURE — 93458 L HRT ARTERY/VENTRICLE ANGIO: CPT

## 2017-11-06 PROCEDURE — 74011000250 HC RX REV CODE- 250: Performed by: SPECIALIST

## 2017-11-06 PROCEDURE — 93041 RHYTHM ECG TRACING: CPT

## 2017-11-06 PROCEDURE — 77030004533 HC CATH ANGI DX IMP BSC -B

## 2017-11-06 PROCEDURE — B2111ZZ FLUOROSCOPY OF MULTIPLE CORONARY ARTERIES USING LOW OSMOLAR CONTRAST: ICD-10-PCS | Performed by: SPECIALIST

## 2017-11-06 PROCEDURE — 82962 GLUCOSE BLOOD TEST: CPT

## 2017-11-06 PROCEDURE — 74011250637 HC RX REV CODE- 250/637: Performed by: SPECIALIST

## 2017-11-06 PROCEDURE — 77030013744

## 2017-11-06 PROCEDURE — C1769 GUIDE WIRE: HCPCS

## 2017-11-06 PROCEDURE — C1887 CATHETER, GUIDING: HCPCS

## 2017-11-06 PROCEDURE — 74011636320 HC RX REV CODE- 636/320: Performed by: SPECIALIST

## 2017-11-06 PROCEDURE — C1725 CATH, TRANSLUMIN NON-LASER: HCPCS

## 2017-11-06 PROCEDURE — C1894 INTRO/SHEATH, NON-LASER: HCPCS

## 2017-11-06 PROCEDURE — C1876 STENT, NON-COA/NON-COV W/DEL: HCPCS

## 2017-11-06 PROCEDURE — 74011250636 HC RX REV CODE- 250/636

## 2017-11-06 PROCEDURE — 65660000000 HC RM CCU STEPDOWN

## 2017-11-06 RX ORDER — CLOPIDOGREL 300 MG/1
TABLET, FILM COATED ORAL
Status: COMPLETED
Start: 2017-11-06 | End: 2017-11-06

## 2017-11-06 RX ORDER — CLOPIDOGREL 300 MG/1
600 TABLET, FILM COATED ORAL AS NEEDED
Status: DISCONTINUED | OUTPATIENT
Start: 2017-11-06 | End: 2017-11-06

## 2017-11-06 RX ORDER — CLOPIDOGREL BISULFATE 75 MG/1
75 TABLET ORAL DAILY
Status: DISCONTINUED | OUTPATIENT
Start: 2017-11-07 | End: 2017-11-07 | Stop reason: HOSPADM

## 2017-11-06 RX ORDER — ATROPINE SULFATE 0.1 MG/ML
1 INJECTION INTRAVENOUS AS NEEDED
Status: DISCONTINUED | OUTPATIENT
Start: 2017-11-06 | End: 2017-11-06

## 2017-11-06 RX ORDER — LIDOCAINE HYDROCHLORIDE 10 MG/ML
10-30 INJECTION INFILTRATION; PERINEURAL
Status: DISCONTINUED | OUTPATIENT
Start: 2017-11-06 | End: 2017-11-06

## 2017-11-06 RX ORDER — SODIUM CHLORIDE 0.9 % (FLUSH) 0.9 %
10 SYRINGE (ML) INJECTION AS NEEDED
Status: DISCONTINUED | OUTPATIENT
Start: 2017-11-06 | End: 2017-11-06

## 2017-11-06 RX ORDER — HEPARIN SODIUM 200 [USP'U]/100ML
1000 INJECTION, SOLUTION INTRAVENOUS CONTINUOUS
Status: DISCONTINUED | OUTPATIENT
Start: 2017-11-06 | End: 2017-11-06

## 2017-11-06 RX ORDER — SODIUM CHLORIDE 9 MG/ML
1.5 INJECTION, SOLUTION INTRAVENOUS CONTINUOUS
Status: DISPENSED | OUTPATIENT
Start: 2017-11-06 | End: 2017-11-06

## 2017-11-06 RX ORDER — MIDAZOLAM HYDROCHLORIDE 1 MG/ML
.5-1 INJECTION, SOLUTION INTRAMUSCULAR; INTRAVENOUS
Status: DISCONTINUED | OUTPATIENT
Start: 2017-11-06 | End: 2017-11-06

## 2017-11-06 RX ORDER — HEPARIN SODIUM 1000 [USP'U]/ML
1000-5000 INJECTION, SOLUTION INTRAVENOUS; SUBCUTANEOUS
Status: DISCONTINUED | OUTPATIENT
Start: 2017-11-06 | End: 2017-11-06

## 2017-11-06 RX ORDER — FENTANYL CITRATE 50 UG/ML
25-200 INJECTION, SOLUTION INTRAMUSCULAR; INTRAVENOUS
Status: DISCONTINUED | OUTPATIENT
Start: 2017-11-06 | End: 2017-11-06

## 2017-11-06 RX ORDER — SODIUM CHLORIDE 9 MG/ML
3 INJECTION, SOLUTION INTRAVENOUS CONTINUOUS
Status: DISPENSED | OUTPATIENT
Start: 2017-11-06 | End: 2017-11-06

## 2017-11-06 RX ORDER — MIDAZOLAM HYDROCHLORIDE 1 MG/ML
INJECTION, SOLUTION INTRAMUSCULAR; INTRAVENOUS
Status: COMPLETED
Start: 2017-11-06 | End: 2017-11-06

## 2017-11-06 RX ADMIN — IOPAMIDOL 211 ML: 755 INJECTION, SOLUTION INTRAVENOUS at 13:39

## 2017-11-06 RX ADMIN — FENTANYL CITRATE 25 MCG: 50 INJECTION, SOLUTION INTRAMUSCULAR; INTRAVENOUS at 12:26

## 2017-11-06 RX ADMIN — LIDOCAINE HYDROCHLORIDE 10 ML: 10 INJECTION, SOLUTION INFILTRATION; PERINEURAL at 12:36

## 2017-11-06 RX ADMIN — SODIUM CHLORIDE 3 ML/KG/HR: 900 INJECTION, SOLUTION INTRAVENOUS at 11:00

## 2017-11-06 RX ADMIN — IOPAMIDOL 50 ML: 755 INJECTION, SOLUTION INTRAVENOUS at 13:24

## 2017-11-06 RX ADMIN — SODIUM CHLORIDE 1.5 ML/KG/HR: 900 INJECTION, SOLUTION INTRAVENOUS at 12:00

## 2017-11-06 RX ADMIN — MIDAZOLAM HYDROCHLORIDE 2 MG: 1 INJECTION, SOLUTION INTRAMUSCULAR; INTRAVENOUS at 12:26

## 2017-11-06 RX ADMIN — GLIPIZIDE 5 MG: 5 TABLET, FILM COATED, EXTENDED RELEASE ORAL at 18:00

## 2017-11-06 RX ADMIN — BIVALIRUDIN 146.3 MG/HR: 250 INJECTION, POWDER, LYOPHILIZED, FOR SOLUTION INTRAVENOUS at 13:04

## 2017-11-06 RX ADMIN — FENTANYL CITRATE 25 MCG: 50 INJECTION, SOLUTION INTRAMUSCULAR; INTRAVENOUS at 13:19

## 2017-11-06 RX ADMIN — CLOPIDOGREL 600 MG: 300 TABLET, FILM COATED ORAL at 13:36

## 2017-11-06 RX ADMIN — MIDAZOLAM HYDROCHLORIDE 2 MG: 1 INJECTION, SOLUTION INTRAMUSCULAR; INTRAVENOUS at 13:19

## 2017-11-06 RX ADMIN — CLOPIDOGREL BISULFATE 600 MG: 300 TABLET, FILM COATED ORAL at 13:36

## 2017-11-06 RX ADMIN — HEPARIN SODIUM 2000 UNITS: 200 INJECTION, SOLUTION INTRAVENOUS at 12:20

## 2017-11-06 RX ADMIN — ATORVASTATIN CALCIUM 20 MG: 20 TABLET, FILM COATED ORAL at 19:21

## 2017-11-06 NOTE — PROGRESS NOTES
SHEATH PULL NOTE:    Patient informed of procedure with questions answered with review. Sheath site prepped with Chloraprep swab. 6 fr sheath in right groin pulled by Esther Gerber. Hand hold and quick clot, with manual compression to site. No bleeding, no hematoma, no pain at site. Hemostasis obtained with hand hold/manual compression at site. Patient tolerated well. No change in status. Handhold for 20 minutes. No change at site. Gauze and tegaderm dressing applied to site. No bleeding, no hematoma, no pain/discomfort at site. Groin instructions provided with review. Continue to monitor procedure site and patient status. *Advised patient to keep head flat and extremity flat to decrease risk of bleeding. *Recommended that patient not drink for ONE HOUR post sheath pull completion. *Recommended that patient not eat for TWO HOURS post sheath pull completion. *Instructed patient on rationale for delay of PO products to decrease risk for aspiration and if additional treatment to procedure site is required. Patient verbalized understanding of instructions with review.

## 2017-11-06 NOTE — ROUTINE PROCESS
Bedside and Verbal shift change report given to [address] (oncoming nurse) by Alvina Grace RN (offgoing nurse). Report given with SBAR, Kardex, MAR and Recent Results.

## 2017-11-06 NOTE — PROGRESS NOTES
Patient admitted for NSTEMI and will have cath today at noon. Patient IPTA and ambulating in room. He is on RA. CM will follow for any discharge needs.

## 2017-11-06 NOTE — PROGRESS NOTES
Problem: Diabetes Self-Management  Goal: *Incorporating physical activity into lifestyle  Outcome: Progressing Towards Goal  Patient ambulating room and to and from bathroom.

## 2017-11-06 NOTE — PROGRESS NOTES
TRANSFER - OUT REPORT:    Verbal report given to Nacho Bautista RN(name) on [facility]  being transferred to [facility]) for routine progression of care       Report consisted of patients Situation, Background, Assessment and   Recommendations(SBAR). Information from the following report(s) Procedure Summary, Intake/Output, MAR, Recent Results and Cardiac Rhythm SR-SB was reviewed with the receiving nurse. Lines:   Peripheral IV 11/03/17 Left Antecubital (Active)   Site Assessment Clean, dry, & intact 11/6/2017  7:40 AM   Phlebitis Assessment 0 11/6/2017  7:40 AM   Infiltration Assessment 0 11/6/2017  7:40 AM   Dressing Status Clean, dry, & intact 11/6/2017  7:40 AM   Dressing Type Tape;Transparent 11/6/2017  7:40 AM   Hub Color/Line Status Pink;Flushed 11/6/2017  7:40 AM   Action Taken Open ports on tubing capped 11/6/2017  7:40 AM   Alcohol Cap Used Yes 11/6/2017  7:40 AM        Opportunity for questions and clarification was provided. Patient transported with:   Monitor  Registered Nurse      1800 transferred via stretcher to room 400 right groin site dsg D&I, area soft.   Pt DTV

## 2017-11-06 NOTE — PROGRESS NOTES
1220:   Cardiac Cath Lab Procedure Area Arrival Note:    [facility] arrived to Cardiac Cath Lab, Procedure Area. Patient identifiers verified with NAME and DATE OF BIRTH. Procedure verified with patient. Consent forms verified. Allergies verified. Patient informed of procedure and plan of care. Questions answered with review. Patient voiced understanding of procedure and plan of care. Patient on cardiac monitor, non-invasive blood pressure, SPO2 monitor. On RA placed on O2 @ 2 lpm via NC.  IV of NS on pump at 125 ml/hr. Patient status doing well without problems. Patient is A&Ox 4. Patient reports no pain. Patient medicated during procedure with orders obtained and verified by Dr. Lenora Bruno.    Refer to patients Cardiac Cath Lab PROCEDURE REPORT for vital signs, assessment, status, and response during procedure, printed at end of case. Printed report on chart or scanned into chart. 1345: Transfer to [facility] RR from Procedure Area    Verbal report given to LAURA Early on [facility] being transferred to Cardiac Cath Lab RR for routine post - op   Patient is post LHC, BMSx1 RCA procedure. Patient stable upon transfer to . Report consisted of patients Situation, Background, Assessment and   Recommendations(SBAR). Information from the following report(s) Procedure Summary and MAR was reviewed with the receiving nurse. Opportunity for questions and clarification was provided. Patient medicated during procedure with orders obtained and verified by Dr. Lenora Bruno and DR solorzano. Refer to patient PROCEDURE REPORT for vital signs, assessment, status, and response during procedure.

## 2017-11-06 NOTE — PROGRESS NOTES
[facility]  -Progress Note     Deepthi Crowe 159- [phone]   11/6/2017      Brian Bell M.D. , F.A.C.C.   --------PCP:-Doe Hatfield MD   Assessment/Plan/Discussion:Cardiology Attending:     Patient seen earlier today and examined  and agree with Advance Practice Provider (LUISANA, NP,PA)  assessment and plans. Gretel Rm is a 71 y.o. male   Seen this am and he related rest pain for 15 minutes on Sunday, got NTG  No pain today  Went to cath  99% mid RCA and 90% proximal PLB the PLB fed by Circ collaterals  EF normal  Underwent PCI with BMS given hx for anemia  Plan DAPT (dual anti-platelet therapy) of one month  Dyslipidemia, secondary prevention -on high potency statin   Anticipate dc in am  Spoke to family    Gokul Coffey MD 11/6/2017         -----Subjective:   . Samuel Broach Samuel Broach Samuel Yazmin Rm is a 71 y.o. male   Had episode of left sided chest pain yesterday that radiated to neck. Pain was relieved with nitroglycerine. No chest pain overnight or this a.m. No SOB. Toy [place] to proceed with [facility]     Patient Vitals for the past 12 hrs:   Temp Pulse Resp BP SpO2   11/06/17 0740 97.6 °F (36.4 °C) (!) 57 19 127/64 95 %   11/06/17 0318 97.6 °F (36.4 °C) (!) 54 18 152/77 96 %   11/05/17 2300 97.8 °F (36.6 °C) (!) 55 17 143/68 97 %        Discussion/Plans/Recs     1. [place],NSTEMI with recent increasing crescendo chest pain CCS 3.  -Troponin flat at 0.67  -TTE 11/4 NL EF, NWMA, mild LVH  -ASA 81 mg daily  -Continue Lipitor 20 mg daily  -No beta blocker due to bradycardia  -Cath today at 12 noon. (plan for BMS d/t hx of anemia)    2. Hx of DM type II: A1C 8.2. POC glucose 120's-140's  -glipizide, SSI    3. HLD:    -Continue statin  Bp stable    4. Hx of PUD:  S/p partial gastrectomy (bilroth 2) d/t PUD 40 yrs ago  -On sulcrafate (home med)    5. Hx of splenectomy d/t hemangioma    Pt with NSTEMI/Unstable angina. For [facility] today at 12 noon.        Cardiac Studies/Hx:  11/4/17: TTE: EF 55-60%  NWMA. Mild LVH         Past Medical History:   Diagnosis Date    Anemia     Dr Fran Rowan found ooze at site of Bilroth anastomosis, was on sucralfate, saw Dr [facility] 11/03/2017    on metformin    Family history of coronary arteriosclerosis     Hemangioma     splenectomy-[facility]    PUD (peptic ulcer disease) 1977    Bilroth 2      ROS-pertinents  negative except as above  The pertinent portions of the medical history,physician and nursing notes, meds,vitals , labs and Ins/Outs,are reviewed in the electronic record.     Results for orders placed or performed during the hospital encounter of 11/03/17   EKG, 12 LEAD, INITIAL   Result Value Ref Range    Ventricular Rate 64 BPM    Atrial Rate 64 BPM    P-R Interval 206 ms    QRS Duration 90 ms    Q-T Interval 418 ms    QTC Calculation (Bezet) 431 ms    Calculated P Axis 75 degrees    Calculated R Axis 19 degrees    Calculated T Axis 52 degrees    Diagnosis       Normal sinus rhythm  No previous ECGs available  Confirmed by Nikia Appiah MD. ([id number]) on 11/4/2017 4:22:13 AM        Vitals:    11/05/17 1910 11/05/17 2300 11/06/17 0318 11/06/17 0740   BP: 158/72 143/68 152/77 127/64   BP 1 Location: Right arm Left arm Right arm Right arm   BP Patient Position: At rest At rest At rest At rest   Pulse: (!) 59 (!) 55 (!) 54 (!) 57   Resp: 20 17 18 19   Temp: 97.8 °F (36.6 °C) 97.8 °F (36.6 °C) 97.6 °F (36.4 °C) 97.6 °F (36.4 °C)   SpO2: 97% 97% 96% 95%   Weight:   83.6 kg (184 lb 4.9 oz)    Height:           Objective:    Physical Exam:   Patient Vitals for the past 12 hrs:   Temp Pulse Resp BP SpO2   11/06/17 0740 97.6 °F (36.4 °C) (!) 57 19 127/64 95 %   11/06/17 0318 97.6 °F (36.4 °C) (!) 54 18 152/77 96 %   11/05/17 2300 97.8 °F (36.6 °C) (!) 55 17 143/68 97 %      General:  alert, cooperative, no distress, appears stated age   ENT, Neck:  no jvd   Chest Wall: inspection normal - no chest wall deformities or tenderness, respiratory effort normal   Lung: clear to auscultation bilaterally, no use of accessory muscles   Heart:  normal rate, regular rhythm, normal S1, S2, no murmurs, rubs, clicks or gallops   Abdomen: Nondistended, soft nontender   Extremities: extremities normal, atraumatic, no cyanosis or edema     TELE:  Sinus bradycardia  Last 24hr Input/Output:    Intake/Output Summary (Last 24 hours) at 11/06/17 7361  Last data filed at 11/05/17 2350   Gross per 24 hour   Intake                0 ml   Output                0 ml   Net                0 ml        Data Review:   Recent Results (from the past 24 hour(s))   GLUCOSE, POC    Collection Time: 11/05/17 11:54 AM   Result Value Ref Range    Glucose (POC) 146 (H) 65 - 100 mg/dL    Performed by Vinita CONLEY    GLUCOSE, POC    Collection Time: 11/05/17  4:47 PM   Result Value Ref Range    Glucose (POC) 120 (H) 65 - 100 mg/dL    Performed by Silva Hu, POC    Collection Time: 11/05/17  9:06 PM   Result Value Ref Range    Glucose (POC) 129 (H) 65 - 100 mg/dL    Performed by [id number], POC    Collection Time: 11/06/17  6:12 AM   Result Value Ref Range    Glucose (POC) 130 (H) 65 - 100 mg/dL    Performed by Truman Carrillo NP 11/6/2017

## 2017-11-06 NOTE — DIABETES MGMT
Consult received for assessment of home management, but patient was in cath lab today. DTC will follow up tomorrow.   Roosevelt Rodriguez MS, RN, CDE\

## 2017-11-06 NOTE — PROGRESS NOTES
Problem: Diabetes Self-Management  Goal: *Disease process and treatment process  Define diabetes and identify own type of diabetes; list 3 options for treating diabetes. Outcome: Progressing Towards Goal  Blood glucose within normal parmeters.

## 2017-11-06 NOTE — PROGRESS NOTES
Cardiac Cath Lab Recovery Arrival Note:      Gilberto Danielson arrived to Cardiac Cath Lab, Recovery Area. Staff introduced to patient. Patient identifiers verified with NAME and DATE OF BIRTH. Procedure verified with patient. Consent forms reviewed and signed by patient or authorized representative and verified. Allergies verified. Patient and family oriented to department. Patient and family informed of procedure and plan of care. Questions answered with review. Patient prepped for procedure, per orders from physician, prior to arrival.    Patient on cardiac monitor, non-invasive blood pressure, SPO2 monitor. On room air. Patient is A&Ox 4. Patient reports no complaints. Patient in stretcher, in low position, with side rails up, call bell within reach, patient instructed to call if assistance as needed. Patient prep in: [address], [place]. Patient family has pager #[phone]  Family in: wife and daughter.    Prep by: Mariela Peña RN    Pre cath teaching completed with pt, wife and daughter

## 2017-11-07 VITALS
HEIGHT: 73 IN | RESPIRATION RATE: 18 BRPM | DIASTOLIC BLOOD PRESSURE: 73 MMHG | WEIGHT: 176 LBS | SYSTOLIC BLOOD PRESSURE: 134 MMHG | HEART RATE: 62 BPM | OXYGEN SATURATION: 97 % | BODY MASS INDEX: 23.33 KG/M2 | TEMPERATURE: 97.8 F

## 2017-11-07 LAB
ATRIAL RATE: 61 BPM
CALCULATED P AXIS, ECG09: 68 DEGREES
CALCULATED R AXIS, ECG10: 8 DEGREES
CALCULATED T AXIS, ECG11: 61 DEGREES
DIAGNOSIS, 93000: NORMAL
GLUCOSE BLD STRIP.AUTO-MCNC: 92 MG/DL (ref 65–100)
P-R INTERVAL, ECG05: 222 MS
Q-T INTERVAL, ECG07: 424 MS
QRS DURATION, ECG06: 82 MS
QTC CALCULATION (BEZET), ECG08: 426 MS
SERVICE CMNT-IMP: NORMAL
VENTRICULAR RATE, ECG03: 61 BPM

## 2017-11-07 PROCEDURE — 82962 GLUCOSE BLOOD TEST: CPT

## 2017-11-07 PROCEDURE — 74011250637 HC RX REV CODE- 250/637: Performed by: SPECIALIST

## 2017-11-07 PROCEDURE — 74011250636 HC RX REV CODE- 250/636: Performed by: SPECIALIST

## 2017-11-07 PROCEDURE — 90471 IMMUNIZATION ADMIN: CPT

## 2017-11-07 PROCEDURE — 90686 IIV4 VACC NO PRSV 0.5 ML IM: CPT | Performed by: SPECIALIST

## 2017-11-07 RX ORDER — NITROGLYCERIN 0.4 MG/1
0.4 TABLET SUBLINGUAL
Qty: 30 TAB | Refills: 6 | Status: SHIPPED | OUTPATIENT
Start: 2017-11-07 | End: 2019-05-24 | Stop reason: SDUPTHER

## 2017-11-07 RX ORDER — GUAIFENESIN 100 MG/5ML
81 LIQUID (ML) ORAL DAILY
Qty: 30 TAB | Refills: 1 | Status: SHIPPED | COMMUNITY
Start: 2017-11-07 | End: 2021-02-16 | Stop reason: ALTCHOICE

## 2017-11-07 RX ORDER — ATORVASTATIN CALCIUM 20 MG/1
20 TABLET, FILM COATED ORAL EVERY EVENING
Qty: 30 TAB | Refills: 6 | Status: ON HOLD | OUTPATIENT
Start: 2017-11-07 | End: 2018-02-02

## 2017-11-07 RX ORDER — CLOPIDOGREL BISULFATE 75 MG/1
75 TABLET ORAL DAILY
Qty: 30 TAB | Refills: 1 | Status: SHIPPED | OUTPATIENT
Start: 2017-11-07 | End: 2017-12-31 | Stop reason: SDUPTHER

## 2017-11-07 RX ADMIN — INFLUENZA VIRUS VACCINE 0.5 ML: 15; 15; 15; 15 SUSPENSION INTRAMUSCULAR at 10:39

## 2017-11-07 RX ADMIN — ASPIRIN 81 MG 81 MG: 81 TABLET ORAL at 10:22

## 2017-11-07 RX ADMIN — Medication 1 CAPSULE: at 10:22

## 2017-11-07 RX ADMIN — VITAMIN D, TAB 1000IU (100/BT) 1000 UNITS: 25 TAB at 10:22

## 2017-11-07 RX ADMIN — SUCRALFATE 1 G: 1 TABLET ORAL at 07:00

## 2017-11-07 RX ADMIN — CLOPIDOGREL BISULFATE 75 MG: 75 TABLET ORAL at 10:22

## 2017-11-07 NOTE — PROGRESS NOTES
I have reviewed discharge instructions with the patient and spouse. The patient and spouse verbalized understanding. PIV removed and opportunities for questions given.

## 2017-11-07 NOTE — DISCHARGE SUMMARY
Cardiology Discharge Summary     Patient ID:  Jigna Wren  [id number]  71 y.o.  1945    Admit Date: 11/3/2017    Discharge Date: 11/7/2017    Admitting Physician: Renetta Strickland MD     Discharge Physician: same    Admission Diagnoses:   Chest pain    Discharge Diagnoses: Active Problems:  -  Chest pain (11/3/2017)  - CAD (11/6/2017)    Discharge Condition: Good    Cardiology Procedures this Admission:  Left heart catheterization with PCI    Consults: None    Hospital Course:   Admitted for chest pain with radiation. Under went cardiac catheterization and found to have severe 99%mid RCA stenosis and 90% proximal PLB, fed by Circ collaterals.   - BMS 3.5mm x 18mm RX Integrity    Stable on date of discharge without ecchymosis or hematoma to right groin. No longer any c/o chest pain. Discharged on ASA, Plavix (DAPT) for 1 month               Lipitor for secondary prevention               BB held 2/2 bradycardia    Visit Vitals    /73 (BP 1 Location: Right arm, BP Patient Position: At rest)    Pulse 62    Temp 97.8 °F (36.6 °C)    Resp 18    Ht 6' 1\" (1.854 m)    Wt 176 lb (79.8 kg)    SpO2 97%    BMI 23.22 kg/m2       Physical Exam  Abdomen: soft, non-tender. Bowel sounds normal. No masses,  no organomegaly  Extremities: no LE edema, + PP bilaterally   Heart: regular rate and rhythm, S1, S2 normal, no murmurs, clicks, rubs or gallops  Lungs: clear to auscultation bilaterally  Neck: supple, symmetrical, trachea midline, no adenopathy, no JVD, no carotid bruits  Neurologic: Grossly normal  Pulses: 2+ and symmetrical    Labs: No results for input(s): WBC, HGB, HCT, PLT, HGBEXT, HCTEXT, PLTEXT, HGBEXT, HCTEXT, PLTEXT in the last 72 hours. No results for input(s): NA, K, CL, CO2, GLU, BUN, CREA, CA, MG, PHOS, ALB, TBIL, SGOT, ALT, INR in the last 72 hours. No lab exists for component: INREXT, INREXT    No results for input(s): TROIQ, CPK, CKMB in the last 72 hours.        Disposition: home    Patient Instructions:   Current Discharge Medication List      START taking these medications    Details   aspirin 81 mg chewable tablet Take 1 Tab by mouth daily. Qty: 30 Tab, Refills: 1      atorvastatin (LIPITOR) 20 mg tablet Take 1 Tab by mouth every evening. Qty: 30 Tab, Refills: 6      clopidogrel (PLAVIX) 75 mg tab Take 1 Tab by mouth daily. Qty: 30 Tab, Refills: 1      nitroglycerin (NITROSTAT) 0.4 mg SL tablet 1 Tab by SubLINGual route every five (5) minutes as needed for Chest Pain. Qty: 30 Tab, Refills: 6         CONTINUE these medications which have NOT CHANGED    Details   Omega-3 Fatty Acids 300 mg cap Take  by mouth daily. metFORMIN (GLUCOPHAGE) 500 mg tablet Take 500 mg by mouth four (4) times daily (with meals). GLIPIZIDE PO Take 5 mg by mouth three (3) times daily. sucralfate (CARAFATE) 100 mg/mL suspension Take 1 g by mouth four (4) times daily. STOP taking these medications       cholecalciferol (VITAMIN D3) 1,000 unit tablet Comments:   Reason for Stopping:         sucralfate (CARAFATE) 1 gram tablet Comments:   Reason for Stopping:               Reference discharge instructions provided by nursing for diet and activity. Follow-up with     Joann Ramey PA-C on November 20, 2017 at 2:20 pm       Signed:  Joann Ramey PA-C  Assessment/Plan/Discussion:Cardiology Attending:     Patient seen earlier today and examined  and agree with Advance Practice Provider (LUISANA, NP,PA)  assessment and plans.   Carlos Shaw is a 71 y.o. male   Good result with PCI with BMS done due to previous anemia  Went over gentle start to exercise   Future Appointments  Date Time Provider Shavon Avila   11/20/2017 2:20 7520 NATALIE Schaeffer    and continue DAPT (dual anti-platelet therapy) for one month    Maureen Palmer MD 11/7/2017

## 2017-11-07 NOTE — PROGRESS NOTES
Hospital follow PCP transitional care appointment has been scheduled with Dr. Villa Siemens for Thursday, 11/9/17 at 10:15 a.m. Pending patient discharge.   Marrianne Riedel, Care Management Specialist.

## 2017-11-07 NOTE — PROGRESS NOTES
Problem: Unstable Angina/NSTEMI: Discharge Outcomes  Goal: *Optimal pain control at patient's stated goal  Outcome: Progressing Towards Goal  Patient has reported NO pain throughout this shift.

## 2017-11-07 NOTE — PROGRESS NOTES
Bedside shift change report given to Flor Roth RN (oncoming nurse) by Dameon Vasquez RN (offgoing nurse). Report included the following information SBAR, Kardex, Procedure Summary, Accordion, Recent Results and Cardiac Rhythm NSR/Sinus Russell Lema.

## 2017-11-07 NOTE — CARDIO/PULMONARY
Cardiac Rehab: MI education folder, with catheterization brochure, is at the bedside of [address] [place] [address]. He did receive a stent yesterday and is for discharge today. Educated using teach back method. Reviewed MI diagnosis definition and purpose of intervention. Discussed risk factors for CAD to include the following: family history, elevated BMI, hyperlipidemia, hypertension, diabetes, stress, and smoking. . Discussed Heart Healthy/Low Sodium (2000 mg) diet. Reviewed the importance of medication compliance, the purpose of plavix, and potential side effects. Discussed follow up appointments with cardiologist, signs and symptoms of angina, and what to report to physician after discharge. Emphasized the value of cardiac rehab. Discussed Cardiac Rehab Program format, benefits, and encouraged enrollment to assist with risk modification and management. Declined to schedule intake appointment because he is very busy, wants to \" think about it' and needs insurance benefit. [address] [place] [address] verbalized understanding with questions answered.   Michelle Zuniga RN

## 2017-11-08 ENCOUNTER — TELEPHONE (OUTPATIENT)
Dept: CARDIAC REHAB | Age: 72
End: 2017-11-08

## 2017-11-08 NOTE — TELEPHONE ENCOUNTER
11/8/2017 Cardiac Rehab: Called Mr. Presley Mesa  to discuss participation in the Cardiac Rehab Program following NSTEMI and stent on 11/3/2017. Spoke with his wife to discuss enrollment. Gave her the insurance information. She would like the daughter to call to discuss. Robert Stone RN

## 2017-11-09 ENCOUNTER — TELEPHONE (OUTPATIENT)
Dept: CARDIAC REHAB | Age: 72
End: 2017-11-09

## 2017-11-09 NOTE — TELEPHONE ENCOUNTER
Cardiac Rehab: Received a call from   [address] daughter Radha Simpler [phone]). Discussed the cardiac rehab program, format, and benefits. Intake for Christiano Pearson scheduled for Tuesday, 11/28/17 at 8:30 am. She requested an appointment the week of 11/17/17. Offered to move appointment up if we have a cancellation. Daughter verbalized understanding with questions answered. Wyona Siemens, RN    11/8/2017 Cardiac Rehab: Called Mr. Mcginnis Wilber  to discuss participation in the Cardiac Rehab Program following NSTEMI and stent on 11/3/2017. Spoke with his wife to discuss enrollment. Gave her the insurance information. She would like the daughter to call to discuss. Ines Davila RN

## 2017-11-20 ENCOUNTER — OFFICE VISIT (OUTPATIENT)
Dept: CARDIOLOGY CLINIC | Age: 72
End: 2017-11-20

## 2017-11-20 VITALS
WEIGHT: 188 LBS | DIASTOLIC BLOOD PRESSURE: 68 MMHG | SYSTOLIC BLOOD PRESSURE: 132 MMHG | BODY MASS INDEX: 24.92 KG/M2 | HEART RATE: 70 BPM | HEIGHT: 73 IN

## 2017-11-20 DIAGNOSIS — I25.10 CORONARY ARTERY DISEASE INVOLVING NATIVE CORONARY ARTERY OF NATIVE HEART WITHOUT ANGINA PECTORIS: Primary | ICD-10-CM

## 2017-11-20 NOTE — PROGRESS NOTES
Cardiology Office Progress Note            Patient: Christiano Pearson  Diagnosis:     ICD-10-CM ICD-9-CM    1. Coronary artery disease involving native coronary artery of native heart without angina pectoris I25.10 414.01 AMB POC EKG ROUTINE W/ 12 LEADS, INTER & REP     Date: 11/22/2017     Time: 11:53 AM     Assessment and Plan     1. Recent NSTEMI with admission   - cath results: diagnostic angiography revealed severe mid rca stenosis. treated with 3.5 by 18mm ovidio to 10atm. Excellent result. - 90% proximal PLB, fed by Circ collaterals  2. CAD   - 2 vessel disease   - BMS to mid RCA   - ASA, Plavix (DAPT for 1 month)   - Discharge with Lipitor, but notes myalgias to legs and arms with use. Patient stopped Lipitor and does not want any statins    Recommended he start Red yeast rice     3. Hx of DM type II: A1C 8.2.    - POC glucose 120's-140's   - glipizide and metformin     3. HLD:     -statin stopped by patient for myalgias - he does not want any statins   - recommended Red yeast rice     4. Hx of PUD:  S/p partial gastrectomy (bilroth 2) d/t PUD 40 yrs ago   -On sulcrafate (home med)     5. Hx of splenectomy d/t hemangioma    His is doing well. Stopped statin 2/2 cramps in legs and arms. Recommended red yeast rice. He has not returned to activities as he was waiting for this follow up. He may begin his exercise. He is in denial of his CAD. He believes because he has no family history and because he is [place] and eats a [place] diet he will be spared any further issues.   Just tried to reinforce healthy eating, exercise and continued regular check up with us.     the following changes in treatment are made: start red yeast rice  reviewed diet, exercise and weight control    Future Appointments  Date Time Provider Shavon Avila   11/28/2017 8:30 AM Eyad Reina RN [address] [place] [address], [address] H   11/28/2017 9:30 AM [facility]   11/30/2017 3:00 PM Emi Addison  [address]          Patient Active Problem List   Diagnosis Code    PUD (peptic ulcer disease) K27.9    Family history of coronary arteriosclerosis Z82.49    Diabetes ([address] E11.9    Hemangioma D18.00    Cataract H26.9    Anemia D64.9    Chest pain R07.9        Subjective:   Christiano Pearson reports feeling well. No longer with CP and no SOB. Right groin healing well without ecchymosis. He has been limiting his activity until he is told he may resume. I told him to resume his exercising. ROS:  A comprehensive review of systems was negative except for that written in the HPI. Objective:      Physical Exam:                Visit Vitals    /68 (BP 1 Location: Right arm, BP Patient Position: Sitting)    Pulse 70    Ht 6' 1\" (1.854 m)    Wt 188 lb (85.3 kg)    BMI 24.8 kg/m2        General Appearance:   Well developed, well nourished,alert and oriented x 3, and   individual in no acute distress. Ears/Nose/Mouth/Throat:    Hearing grossly normal.         Neck:  Supple. Chest:    Lungs clear to auscultation bilaterally. Cardiovascular:   Regular rate and rhythm, S1, S2 normal, no murmur. Abdomen:    Soft, non-tender, bowel sounds are active. Extremities:  No edema bilaterally. Skin:  Warm and dry. Data Review:    Labs:  No results found for this or any previous visit (from the past 24 hour(s)). Radiology:        Current Outpatient Prescriptions   Medication Sig    aspirin 81 mg chewable tablet Take 1 Tab by mouth daily.  clopidogrel (PLAVIX) 75 mg tab Take 1 Tab by mouth daily.  nitroglycerin (NITROSTAT) 0.4 mg SL tablet 1 Tab by SubLINGual route every five (5) minutes as needed for Chest Pain.  Omega-3 Fatty Acids 300 mg cap Take  by mouth daily.  metFORMIN (GLUCOPHAGE) 500 mg tablet Take 500 mg by mouth four (4) times daily (with meals).     GLIPIZIDE PO Take 5 mg by mouth three (3) times daily.  sucralfate (CARAFATE) 100 mg/mL suspension Take 1 g by mouth four (4) times daily.  atorvastatin (LIPITOR) 20 mg tablet Take 1 Tab by mouth every evening. No current facility-administered medications for this visit.            Derick Acuña PA-C     [facility] [address] [place] [address], [address] [place] [address],[address] [place] [address]   [phone]

## 2017-11-20 NOTE — MR AVS SNAPSHOT
Visit Information Date & Time Provider Department Dept. Phone Encounter #  
 11/20/2017  2:20 PM Addison Rivera PA-C [facility] Kiera Strickland [phone] [id number] Your Appointments 11/30/2017  3:00 PM  
ESTABLISHED PATIENT with Horace Gitelman, MD  
[facility] ([place] SCHEDULING) Appt Note: Patient requests a follow up with Dr Kassandra Parikh [phone] [facility]  
[address] [facility] [address] [id number] Upcoming Health Maintenance Date Due Hepatitis C Screening 1945 FOOT EXAM Q1 11/6/1956 MICROALBUMIN Q1 11/6/1956 EYE EXAM RETINAL OR DILATED Q1 11/6/1956 DTaP/Tdap/Td series (1 - Tdap) 11/6/1967 FOBT Q 1 YEAR AGE 50-75 11/6/1996 ZOSTER VACCINE AGE 60> 9/6/2006 GLAUCOMA SCREENING Q2Y 11/6/2011 Pneumococcal 65+ Low/Medium Risk (1 of 2 - PCV13) 11/6/2011 MEDICARE YEARLY EXAM 11/6/2011 HEMOGLOBIN A1C Q6M 5/4/2018 LIPID PANEL Q1 11/4/2018 Allergies as of 11/20/2017  Review Complete On: 11/20/2017 By: Domingo Sparks LPN Severity Noted Reaction Type Reactions Sulfa (Sulfonamide Antibiotics)  11/03/2017    Hives Current Immunizations  Reviewed on 11/5/2017 Name Date Influenza Vaccine (Quad) PF 11/7/2017 10:39 AM  
  
 Not reviewed this visit You Were Diagnosed With   
  
 Codes Comments Coronary artery disease involving native coronary artery of native heart without angina pectoris    -  Primary ICD-10-CM: I25.10 ICD-9-CM: 414.01 Vitals BP Pulse Height(growth percentile) Weight(growth percentile) BMI Smoking Status 132/68 (BP 1 Location: Right arm, BP Patient Position: Sitting) 70 6' 1\" (1.854 m) 188 lb (85.3 kg) 24.8 kg/m2 Never Smoker Vitals History BMI and BSA Data Body Mass Index Body Surface Area  
 24.8 kg/m 2 2.1 m 2 Preferred Pharmacy Pharmacy Name Phone [facility] [address] [place] [address], [address] [place] [address],[address] [phone] Your Updated Medication List  
  
   
This list is accurate as of: 11/20/17  3:16 PM.  Always use your most recent med list.  
  
  
  
  
 aspirin 81 mg chewable tablet Take 1 Tab by mouth daily. atorvastatin 20 mg tablet Commonly known as:  LIPITOR Take 1 Tab by mouth every evening. clopidogrel 75 mg Tab Commonly known as:  PLAVIX Take 1 Tab by mouth daily. GLIPIZIDE PO Take 5 mg by mouth three (3) times daily. metFORMIN 500 mg tablet Commonly known as:  GLUCOPHAGE Take 500 mg by mouth four (4) times daily (with meals). nitroglycerin 0.4 mg SL tablet Commonly known as:  NITROSTAT  
1 Tab by SubLINGual route every five (5) minutes as needed for Chest Pain. Omega-3 Fatty Acids 300 mg Cap Take  by mouth daily. sucralfate 100 mg/mL suspension Commonly known as:  [place] Take 1 g by mouth four (4) times daily. We Performed the Following AMB POC EKG ROUTINE W/ 12 LEADS, INTER & REP [63328 CPT(R)] To-Do List   
 11/28/2017 8:30 AM  
  Appointment with Kait Foster RN at [address] [place] [address] ([phone])  
  
 11/28/2017 9:30 AM  
  Appointment with Derek Garcia at [address] [place] [address] ([phone]) [facility]! Tomas Nicholson introduces [employer] patient portal. Now you can access parts of your medical record, email your doctor's office, and request medication refills online. 1. In your internet browser, go to [email or web address] 2. Click on the First Time User? Click Here link in the Sign In box. You will see the New Member Sign Up page. 3. Enter your [employer] Access Code exactly as it appears below. You will not need to use this code after youve completed the sign-up process. If you do not sign up before the expiration date, you must request a new code. · [employer] Access Code: [email or web address] Expires: 2/4/2018  7:12 AM 
 
4. Enter the last four digits of your Social Security Number (xxxx) and Date of Birth (mm/dd/yyyy) as indicated and click Submit. You will be taken to the next sign-up page. 5. Create a [employer] ID. This will be your [employer] login ID and cannot be changed, so think of one that is secure and easy to remember. 6. Create a [employer] password. You can change your password at any time. 7. Enter your Password Reset Question and Answer. This can be used at a later time if you forget your password. 8. Enter your e-mail address. You will receive e-mail notification when new information is available in [address] 9. Click Sign Up. You can now view and download portions of your medical record. 10. Click the Download Summary menu link to download a portable copy of your medical information. If you have questions, please visit the Frequently Asked Questions section of the [employer] website. Remember, [employer] is NOT to be used for urgent needs. For medical emergencies, dial 911. Now available from your iPhone and Android! Please provide this summary of care documentation to your next provider. Your primary care clinician is listed as Anita Mcneal. If you have any questions after today's visit, please call [phone].

## 2017-11-20 NOTE — PROGRESS NOTES
Chief Complaint   Patient presents with    Coronary Artery Disease    Follow-up     from cath     Verified patient with two types of identifiers. Verified medications with the patient. Verified pharmacy with patient.

## 2017-11-22 ENCOUNTER — TELEPHONE (OUTPATIENT)
Dept: CARDIAC REHAB | Age: 72
End: 2017-11-22

## 2017-11-22 NOTE — TELEPHONE ENCOUNTER
11/22/2017 Cardiac Wellness: Called Mr. Svetlana Duenas to remind of intake appointment on Tuesday, November 28, 2017. Left voicemail message. Provided pt with contact information for [facility]. Also, reminded pt to bring a list of current medications, a personal schedule, and to wear comfortable clothes and shoes.  Mitzie Ormond

## 2017-11-27 ENCOUNTER — TELEPHONE (OUTPATIENT)
Dept: CARDIAC REHAB | Age: 72
End: 2017-11-27

## 2017-11-27 NOTE — TELEPHONE ENCOUNTER
Patient's daughter called to cancel pt's appointment for Tuesday Nov. 28th.  Requested no follow up call, that her father would call us when he was interested in participating in the program.

## 2017-11-28 ENCOUNTER — APPOINTMENT (OUTPATIENT)
Dept: CARDIAC REHAB | Age: 72
End: 2017-11-28

## 2017-11-30 ENCOUNTER — OFFICE VISIT (OUTPATIENT)
Dept: CARDIOLOGY CLINIC | Age: 72
End: 2017-11-30

## 2017-11-30 VITALS
DIASTOLIC BLOOD PRESSURE: 70 MMHG | HEART RATE: 82 BPM | BODY MASS INDEX: 25.58 KG/M2 | SYSTOLIC BLOOD PRESSURE: 160 MMHG | WEIGHT: 193 LBS | RESPIRATION RATE: 18 BRPM | HEIGHT: 73 IN | OXYGEN SATURATION: 98 %

## 2017-11-30 DIAGNOSIS — Z95.5 S/P BARE METAL CORONARY ARTERY STENT: ICD-10-CM

## 2017-11-30 DIAGNOSIS — E11.9 TYPE 2 DIABETES MELLITUS WITHOUT COMPLICATION, WITHOUT LONG-TERM CURRENT USE OF INSULIN (HCC): ICD-10-CM

## 2017-11-30 DIAGNOSIS — R07.9 CHEST PAIN, UNSPECIFIED TYPE: ICD-10-CM

## 2017-11-30 DIAGNOSIS — I10 HYPERTENSION, ESSENTIAL: ICD-10-CM

## 2017-11-30 DIAGNOSIS — Z82.49 FAMILY HISTORY OF CORONARY ARTERIOSCLEROSIS: ICD-10-CM

## 2017-11-30 DIAGNOSIS — I25.10 CAD IN NATIVE ARTERY: ICD-10-CM

## 2017-11-30 DIAGNOSIS — I25.110 CORONARY ARTERY DISEASE INVOLVING NATIVE CORONARY ARTERY OF NATIVE HEART WITH UNSTABLE ANGINA PECTORIS (HCC): Primary | ICD-10-CM

## 2017-11-30 RX ORDER — ISOSORBIDE MONONITRATE 30 MG/1
30 TABLET, EXTENDED RELEASE ORAL DAILY
Qty: 30 TAB | Refills: 3 | Status: SHIPPED | OUTPATIENT
Start: 2017-11-30 | End: 2017-12-15 | Stop reason: SDUPTHER

## 2017-11-30 RX ORDER — ISOSORBIDE MONONITRATE 30 MG/1
30 TABLET, EXTENDED RELEASE ORAL DAILY
COMMUNITY
End: 2017-11-30 | Stop reason: SDUPTHER

## 2017-11-30 NOTE — MR AVS SNAPSHOT
Visit Information Date & Time Provider Department Dept. Phone Encounter #  
 11/30/2017  3:00 PM Jamaal Mulligan MD [facility] [phone] [id number] Upcoming Health Maintenance Date Due Hepatitis C Screening 1945 FOOT EXAM Q1 11/6/1956 MICROALBUMIN Q1 11/6/1956 EYE EXAM RETINAL OR DILATED Q1 11/6/1956 DTaP/Tdap/Td series (1 - Tdap) 11/6/1967 FOBT Q 1 YEAR AGE 50-75 11/6/1996 ZOSTER VACCINE AGE 60> 9/6/2006 GLAUCOMA SCREENING Q2Y 11/6/2011 Pneumococcal 65+ Low/Medium Risk (1 of 2 - PCV13) 11/6/2011 MEDICARE YEARLY EXAM 11/6/2011 HEMOGLOBIN A1C Q6M 5/4/2018 LIPID PANEL Q1 11/4/2018 Allergies as of 11/30/2017  Review Complete On: 11/30/2017 By: Yaritza Gutierrez Severity Noted Reaction Type Reactions Sulfa (Sulfonamide Antibiotics)  11/03/2017    Hives Current Immunizations  Reviewed on 11/5/2017 Name Date Influenza Vaccine (Quad) PF 11/7/2017 10:39 AM  
  
 Not reviewed this visit You Were Diagnosed With   
  
 Codes Comments Chest pain, unspecified type    -  Primary ICD-10-CM: R07.9 ICD-9-CM: 786.50 Vitals BP Pulse Resp Height(growth percentile) Weight(growth percentile) SpO2  
 160/70 (BP 1 Location: Left arm) 82 18 6' 1\" (1.854 m) 193 lb (87.5 kg) 98% BMI Smoking Status 25.46 kg/m2 Never Smoker Vitals History BMI and BSA Data Body Mass Index Body Surface Area  
 25.46 kg/m 2 2.12 m 2 Preferred Pharmacy Pharmacy Name Phone [facility] [address] [facility], [address] [place] [address],[address] [phone] Your Updated Medication List  
  
   
This list is accurate as of: 11/30/17  3:37 PM.  Always use your most recent med list.  
  
  
  
  
 aspirin 81 mg chewable tablet Take 1 Tab by mouth daily. atorvastatin 20 mg tablet Commonly known as:  LIPITOR Take 1 Tab by mouth every evening. clopidogrel 75 mg Tab Commonly known as:  PLAVIX Take 1 Tab by mouth daily. GLIPIZIDE PO Take 5 mg by mouth three (3) times daily. IMDUR 30 mg tablet Generic drug:  isosorbide mononitrate ER Take 30 mg by mouth daily. metFORMIN 500 mg tablet Commonly known as:  GLUCOPHAGE Take 500 mg by mouth four (4) times daily (with meals). nitroglycerin 0.4 mg SL tablet Commonly known as:  NITROSTAT  
1 Tab by SubLINGual route every five (5) minutes as needed for Chest Pain. Omega-3 Fatty Acids 300 mg Cap Take  by mouth daily. sucralfate 100 mg/mL suspension Commonly known as:  Shanae [place] Take 1 g by mouth four (4) times daily. We Performed the Following AMB POC EKG ROUTINE W/ 12 LEADS, INTER & REP [44556 CPT(R)] HGB & HCT [26519 CPT(R)] METABOLIC PANEL, BASIC [84444 CPT(R)] TROPONIN I [82982 CPT(R)] Patient Instructions Have blood work done Start Imdur 30 mg daily Nuclear stress test 
 
BP check daily and keep diary Follow up with  in 2 weeks Introducing [facility] & HEALTH SERVICES! Dear Felix Lemus: Thank you for requesting a [email or web address] account. Our records indicate that you already have an active [email or web address] account. You can access your account anytime at [email or web address] Did you know that you can access your hospital and ER discharge instructions at any time in [email or web address]? You can also review all of your test results from your hospital stay or ER visit. Additional Information If you have questions, please visit the Frequently Asked Questions section of the [email or web address] website at [email or web address] Remember, [email or web address] is NOT to be used for urgent needs. For medical emergencies, dial 911. Now available from your iPhone and Android! Please provide this summary of care documentation to your next provider. Your primary care clinician is listed as Jose Carroll.  If you have any questions after today's visit, please call [phone].

## 2017-11-30 NOTE — PATIENT INSTRUCTIONS
Have blood work done    [employer] Imdur 30 mg daily    Nuclear stress test    BP check daily and keep diary    Follow up with  in 2 weeks

## 2017-11-30 NOTE — PROGRESS NOTES
Mathewad Husejnovic     1945       Elise Pizarro MD, [facility]  Date of Visit-11/30/2017   PCP is Rylie Echols MD   [facility] [place]  [facility]  HPI:  Doris Lara is a 71 y.o. male   Pt hospitalized 11/3-11/7/17 with unstable angina had a 99% mid RCA stenosis and a 90% proximal PLB that had Circumflex collaterals. He underwent bare metal stent with plan for DAPT for one month. No beta blocker due to bradycardia . Saw the PA on the 20th but stopped the statin due to cramping in the legs. Wife is physician in her their country of origin and changes his medications, patient often seems to decline parts of the suggested plan of care. The pt reports that he is trying to return to his normal activities. He states that he is trying to walk more and while working he has pain in his chest. The pt reports that this does feel like the pain he had previously. He states that with this chest pain he is experiencing pain that radiates to his throat. The pt reports that when going up the steps he is also feeling pressure. Along with this chest pain going up to his neck/throat it radiates into his back as well. This has been going on for the last few days. He reports that this pain is happening every time he walks. The pt had very severe muscle cramps so he stopped taking the statin. Even at rest he is feeling this chest pain. He states that after the procedure and until yesterday he was experiencing hematuria. A small kidney stone was found and he is taking medication for this. The pt is also feeling very tired and fatigued. He is seeing . Denies edema, syncope or shortness of breath at rest, has no tachycardia, palpitations or sense of arrhythmia. Assessment/Plan:     1. CAD /NSTEMI  -----now with recurrent chest pain with exertion similar but less severe than his presenting complaints.  The question is whether his stent has closed which is unlikely or if the distal lesion is causing his pain. 2. Start imdur 30 mg each morning     3. Get an exercise nuclear test to evaluate the inferior wall     4. Pt can stop plavix on December 3   5. Blood pressure is elevated likely due to the kidney stone and pain. ----Advised to keep daily BP diary     6. EKG shows minor lateral changes, NSR and poor baseline  7. Statin intolerant with severe myalgia   -----may consider Pravachol at a later date   8. Recent hematuria and a kidney stone     check BMP, troponin and HGB      Patient Instructions   Have blood work done    [place] Imdur 30 mg daily    Nuclear stress test    BP check daily and keep diary    Follow up with  in 2 weeks     Future Appointments  Date Time Provider Shavon Avila   12/7/2017 8:00 AM NUCLEAR, [address]   12/14/2017 3:00 PM MD LORIE Radford SCHED           Impression:   1. Coronary artery disease involving native coronary artery of native heart with unstable angina pectoris ([facility]    2. S/p bare metal coronary artery stent    3. CAD in native artery    4. Hypertension, essential    5. Family history of coronary arteriosclerosis    6. Chest pain, unspecified type    7. Type 2 diabetes mellitus without complication, without long-term current use of insulin ([address]       Cardiac History:   11/4/17: TTE: EF 55-60%  NWMA. Mild LVH     ROS-except as noted above. . A complete cardiac and respiratory are reviewed and negative except as above ; Resp-denies wheezing  or productive cough,.  Const- No unusual weight loss or fever; Neuro-no recent seizure or CVA ; GI- No BRBPR, abdom pain, bloating ; - no  hematuria     Past Medical History:   Diagnosis Date    Anemia     Dr Surendra Carson found ooze at site of Bilroth anastomosis, was on sucralfate, saw Dr Ariana Bates Diabetes ([facility] 11/03/2017    on metformin    Family history of coronary arteriosclerosis     Hemangioma     splenectomy-Jace ARVIZU (peptic ulcer disease) 1977    Bilroth 2      Social Hx= reports that he has never smoked. He has never used smokeless tobacco. He reports that he does not drink alcohol or use illicit drugs. Exam and Labs:  /70 (BP 1 Location: Left arm)  Pulse 82  Resp 18  Ht 6' 1\" (1.854 m)  Wt 193 lb (87.5 kg)  SpO2 98%  BMI 25.46 kg/[id number]:  NAD, comfortable  Head: NC,AT. Eyes: No scleral icterus. Neck:  Neck supple. No JVD present. Throat: moist mucous membranes. Chest: Effort normal & normal respiratory excursion . Neurological: alert, conversant and oriented . Skin: Skin is not cold. No obvious systemic rash noted. Not diaphoretic. No erythema. Psychiatric:  Grossly normal mood and affect. Behavior appears normal. Extremities:  no clubbing or cyanosis. Abdomen: non distended    Lungs:breath sounds normal. No stridor. distress, wheezes or  Rales. Heart: normal rate, regular rhythm, normal S1, S2, no murmurs, rubs, clicks or gallops , PMI non displaced. Edema: Edema is none.   Lab Results   Component Value Date/Time    Cholesterol, total 212 11/04/2017 12:55 AM    HDL Cholesterol 62 11/04/2017 12:55 AM    LDL, calculated 131.4 11/04/2017 12:55 AM    Triglyceride 93 11/04/2017 12:55 AM    CHOL/HDL Ratio 3.4 11/04/2017 12:55 AM     Lab Results   Component Value Date/Time    Sodium 139 11/03/2017 04:55 PM    Potassium 3.6 11/03/2017 04:55 PM    Chloride 103 11/03/2017 04:55 PM    CO2 31 11/03/2017 04:55 PM    Anion gap 5 11/03/2017 04:55 PM    Glucose 134 11/03/2017 04:55 PM    BUN 9 11/03/2017 04:55 PM    Creatinine 0.65 11/03/2017 04:55 PM    BUN/Creatinine ratio 14 11/03/2017 04:55 PM    GFR est AA >60 11/03/2017 04:55 PM    GFR est non-AA >60 11/03/2017 04:55 PM    Calcium 8.4 11/03/2017 04:55 PM      Wt Readings from Last 3 Encounters:   11/30/17 193 lb (87.5 kg)   11/20/17 188 lb (85.3 kg)   11/07/17 176 lb (79.8 kg)      BP Readings from Last 3 Encounters:   11/30/17 160/70   11/20/17 132/68 11/07/17 134/73      Current Outpatient Prescriptions   Medication Sig    aspirin 81 mg chewable tablet Take 1 Tab by mouth daily.  clopidogrel (PLAVIX) 75 mg tab Take 1 Tab by mouth daily.  nitroglycerin (NITROSTAT) 0.4 mg SL tablet 1 Tab by SubLINGual route every five (5) minutes as needed for Chest Pain.  Omega-3 Fatty Acids 300 mg cap Take  by mouth daily.  metFORMIN (GLUCOPHAGE) 500 mg tablet Take 500 mg by mouth four (4) times daily (with meals).  GLIPIZIDE PO Take 5 mg by mouth three (3) times daily.  sucralfate (CARAFATE) 100 mg/mL suspension Take 1 g by mouth four (4) times daily.  atorvastatin (LIPITOR) 20 mg tablet Take 1 Tab by mouth every evening. No current facility-administered medications for this visit. Impression see above.       Written by Rosaura Zavala, as dictated by Melina Phillips MD.

## 2017-12-01 LAB
BUN SERPL-MCNC: 12 MG/DL (ref 8–27)
BUN/CREAT SERPL: 17 (ref 10–24)
CALCIUM SERPL-MCNC: 9.1 MG/DL (ref 8.6–10.2)
CHLORIDE SERPL-SCNC: 101 MMOL/L (ref 96–106)
CO2 SERPL-SCNC: 24 MMOL/L (ref 18–29)
CREAT SERPL-MCNC: 0.7 MG/DL (ref 0.76–1.27)
GFR SERPLBLD CREATININE-BSD FMLA CKD-EPI: 110 ML/MIN/1.73
GFR SERPLBLD CREATININE-BSD FMLA CKD-EPI: 95 ML/MIN/1.73
GLUCOSE SERPL-MCNC: 290 MG/DL (ref 65–99)
HCT VFR BLD AUTO: 39.2 % (ref 37.5–51)
HGB BLD-MCNC: 12.5 G/DL (ref 12.6–17.7)
POTASSIUM SERPL-SCNC: 4.3 MMOL/L (ref 3.5–5.2)
SODIUM SERPL-SCNC: 142 MMOL/L (ref 134–144)
TROPONIN I SERPL-MCNC: <0.01 NG/ML (ref 0–0.04)

## 2017-12-06 PROBLEM — Z95.5 S/P BARE METAL CORONARY ARTERY STENT: Status: ACTIVE | Noted: 2017-12-06

## 2017-12-06 PROBLEM — I25.10 CAD IN NATIVE ARTERY: Status: ACTIVE | Noted: 2017-11-03

## 2017-12-06 PROBLEM — I10 HYPERTENSION, ESSENTIAL: Status: ACTIVE | Noted: 2017-12-06

## 2017-12-07 ENCOUNTER — CLINICAL SUPPORT (OUTPATIENT)
Dept: CARDIOLOGY CLINIC | Age: 72
End: 2017-12-07

## 2017-12-07 DIAGNOSIS — R07.9 CHEST PAIN, UNSPECIFIED TYPE: ICD-10-CM

## 2017-12-07 DIAGNOSIS — I10 HYPERTENSION, ESSENTIAL: ICD-10-CM

## 2017-12-07 DIAGNOSIS — Z98.61 HISTORY OF PTCA: ICD-10-CM

## 2017-12-07 DIAGNOSIS — Z95.5 S/P BARE METAL CORONARY ARTERY STENT: ICD-10-CM

## 2017-12-07 DIAGNOSIS — I25.10 CAD IN NATIVE ARTERY: Primary | ICD-10-CM

## 2017-12-07 NOTE — PROGRESS NOTES
See scanned document. Ordering Doctor:Dr. John Paul Bonilla  Reading Doctor:Dr. John Paul Bonilla    Patient had cp with lexiscan, relieved after about 4 min.

## 2017-12-12 NOTE — PROGRESS NOTES
Let him know stress test looks like no new blockages  How is his pain? Any better?   Keep fu appt  12/14/2017 3:00 PM    Lisandra Burch MD        [place] [address]

## 2017-12-12 NOTE — PROGRESS NOTES
Spoke with patient using 2 identifiers, name and . Patient notified per Dr. Bandar Austin recommendation: stress test looks like no new blockages. Patient reports pain persists rating pain as 2-3 out of 10, states, \"coming almost every day, sometimes when walking and sometimes when sleeping. Couldn't say if it is any better. \" Patient reminded to keep follow up appointment on 17. Patient verbalized understanding.

## 2017-12-14 ENCOUNTER — OFFICE VISIT (OUTPATIENT)
Dept: CARDIOLOGY CLINIC | Age: 72
End: 2017-12-14

## 2017-12-14 VITALS
OXYGEN SATURATION: 98 % | RESPIRATION RATE: 16 BRPM | BODY MASS INDEX: 25.05 KG/M2 | HEART RATE: 80 BPM | DIASTOLIC BLOOD PRESSURE: 60 MMHG | SYSTOLIC BLOOD PRESSURE: 110 MMHG | HEIGHT: 73 IN | WEIGHT: 189 LBS

## 2017-12-14 DIAGNOSIS — E11.9 TYPE 2 DIABETES MELLITUS WITHOUT COMPLICATION, WITHOUT LONG-TERM CURRENT USE OF INSULIN (HCC): ICD-10-CM

## 2017-12-14 DIAGNOSIS — Z82.49 FAMILY HISTORY OF CORONARY ARTERIOSCLEROSIS: ICD-10-CM

## 2017-12-14 DIAGNOSIS — K27.9 PUD (PEPTIC ULCER DISEASE): ICD-10-CM

## 2017-12-14 DIAGNOSIS — I25.10 CAD IN NATIVE ARTERY: Primary | ICD-10-CM

## 2017-12-14 DIAGNOSIS — Z95.5 S/P BARE METAL CORONARY ARTERY STENT: ICD-10-CM

## 2017-12-14 DIAGNOSIS — I10 HYPERTENSION, ESSENTIAL: ICD-10-CM

## 2017-12-14 RX ORDER — AMPICILLIN TRIHYDRATE 250 MG
600 CAPSULE ORAL DAILY
COMMUNITY
End: 2019-05-16

## 2017-12-14 NOTE — PROGRESS NOTES
Mirsad Husejnovic     1945       Elise Gilbert MD, [facility]  Date of Visit-12/14/2017   PCP is Linwood Corrales MD   [facility] [place]  [facility]  HPI:  Jimmy Mcclendon is a 71 y.o. male   Unstable angina had a bare metal stent placed in a mid RCA 99% stenosis in November, he has stopped his statin, saw us on 11/30 and complained of continued chest pain and shortness of breath. He had a stress nuclear which was normal; had BMS he was to stop his Plavix on 12/3 and started on Imdur. He had elevated blood pressure due to the kidney stone and his BP is normal today. The pt reports that he is feeling better. He states that he is not feeling as good as he had hoped. The pt has passed his kidney stone and is feeling better. He is still have chest pain. The pt reports that when walking he had pain in his neck while walking. He sates that his home blood pressures are very variable. The pt reports that his chest pain is less since starting the Imdur. Assessment/Plan:     1. CAD with angioplasty and stent to the RCA in early November, 6 weeks ago.  ---- He continues to have pain but it is very difficult to understand if it is similar to the prevoius pain, he is a very vague historian.  ----- I have personally visually reviewed the cath films, he had an excellent result to the stent he does have disease in the posterior lateral that appeared to be moderate, if his pain persists perhaps we should do an angioplasty to those vessels. 2. In the meantime we tried to discuss the Imdur. ----He stated that he didn't have high blood pressure until  starting the imdur but in fact his BP was 160 in the office before starting the imdur and in the hospital his BP varied between 130 and 170 well before the imdur was ever started.  I have suggested that he should increase the imdur to 60 mg a day for the pain, he asked if he could stop the imdur because he thinks the imdur causes the high blood pressure. At this point he can stop it for a few days and let us know how he feels but my recommendation is to go from 30 to 60     3. Hypertension,   ---very variable blood pressures, anywhere from 100 to 160. We often see this in patients with stress and anxiety. He has been resistant to medications so I am not sure that adding an ACE inhibitor is something that he would be open to. I have encouraged him to go see his pcp for an anxiety related medication. I have encouraged him to do cardiac rehab but he refuses insisting that he is getting plenty of steps between 6,000 and 10,000 a day. I explained to him that cardiac rehab is more than just physical exercise but he declines. 4. Recent nuclear was normal, he walked 3 minutes and 19 seconds and had an EF of 59%. 5. Diabetes on metformin and glipizide, follow with pcp  6. Kidney stone, resolved. 7. Follow up in 6 weeks, he will communicate back to us by phone after he sees Dr. Harper Hernandez for anxiety and once he has adjusted the Imdur. Future Appointments  Date Time Provider Shavon Avila   1/25/2018 3:40 PM MD LORIE Smyth [place] SCHED           Impression:   1. CAD in native artery    2. S/p bare metal coronary artery stent    3. Hypertension, essential    4. Family history of coronary arteriosclerosis    5. PUD (peptic ulcer disease)    6. Type 2 diabetes mellitus without complication, without long-term current use of insulin ([facility]       Cardiac History:   11/4/17: TTE: EF 55-60%  NWMA. Mild LVH     ROS-except as noted above. . A complete cardiac and respiratory are reviewed and negative except as above ; Resp-denies wheezing  or productive cough,.  Const- No unusual weight loss or fever; Neuro-no recent seizure or CVA ; GI- No BRBPR, abdom pain, bloating ; - no  hematuria   see supplement sheet, initialed and to be scanned by staff  Past Medical History:   Diagnosis Date    Anemia     Dr Nancy Donald found artur at site of Bilroth anastomosis, was on sucralfate, saw Dr Hima Velarde CAD in native artery 11/03/2017    admit [place], RCA 99% mid, PLB prox 90% collaterals from [address] Diabetes ([facility] 11/03/2017    on metformin    Family history of coronary arteriosclerosis     Hemangioma     splenectomy-Mccormick    Hypertension, essential 12/6/2017    NSTEMI (non-ST elevated myocardial infarction) ([facility] 11/03/2017    troponin 0.67    PUD (peptic ulcer disease) 1977    Bilroth 2    S/p bare metal coronary artery stent 12/6/2017    Statin intolerance 11/30/2017    declined Lipitor post BMS      Social Hx= reports that he has never smoked. He has never used smokeless tobacco. He reports that he does not drink alcohol or use illicit drugs. Exam and Labs:  /60 (BP 1 Location: Left arm, BP Patient Position: Sitting)  Pulse 80  Resp 16  Ht 6' 1\" (1.854 m)  Wt 189 lb (85.7 kg)  SpO2 98%  BMI 24.94 kg/u8Uqttkelcloupzz:  NAD, comfortable  Head: NC,AT. Eyes: No scleral icterus. Neck:  Neck supple. No JVD present. Throat: moist mucous membranes. Chest: Effort normal & normal respiratory excursion . Neurological: alert, conversant and oriented . Skin: Skin is not cold. No obvious systemic rash noted. Not diaphoretic. No erythema. Psychiatric:  Grossly normal mood and affect. Behavior appears normal. Extremities:  no clubbing or cyanosis. Abdomen: non distended    Lungs:breath sounds normal. No stridor. distress, wheezes or  Rales. Heart: normal rate, regular rhythm, normal S1, S2, no murmurs, rubs, clicks or gallops , PMI non displaced. Edema: Edema is none.   Lab Results   Component Value Date/Time    Cholesterol, total 212 11/04/2017 12:55 AM    HDL Cholesterol 62 11/04/2017 12:55 AM    LDL, calculated 131.4 11/04/2017 12:55 AM    Triglyceride 93 11/04/2017 12:55 AM    CHOL/HDL Ratio 3.4 11/04/2017 12:55 AM     Lab Results   Component Value Date/Time    Sodium 142 11/30/2017 03:56 PM    Potassium 4.3 11/30/2017 03:56 PM    Chloride 101 11/30/2017 03:56 PM    CO2 24 11/30/2017 03:56 PM    Anion gap 5 11/03/2017 04:55 PM    Glucose 290 11/30/2017 03:56 PM    BUN 12 11/30/2017 03:56 PM    Creatinine 0.70 11/30/2017 03:56 PM    BUN/Creatinine ratio 17 11/30/2017 03:56 PM    GFR est  11/30/2017 03:56 PM    GFR est non-AA 95 11/30/2017 03:56 PM    Calcium 9.1 11/30/2017 03:56 PM      Wt Readings from Last 3 Encounters:   12/14/17 189 lb (85.7 kg)   11/30/17 193 lb (87.5 kg)   11/20/17 188 lb (85.3 kg)      BP Readings from Last 3 Encounters:   12/14/17 110/60   11/30/17 160/70   11/20/17 132/68      Current Outpatient Prescriptions   Medication Sig    red yeast rice extract 600 mg cap Take 600 mg by mouth two (2) times a day.  isosorbide mononitrate ER (IMDUR) 30 mg tablet Take 1 Tab by mouth daily.  aspirin 81 mg chewable tablet Take 1 Tab by mouth daily.  Omega-3 Fatty Acids 300 mg cap Take  by mouth daily.  metFORMIN (GLUCOPHAGE) 500 mg tablet Take 500 mg by mouth four (4) times daily (with meals).  GLIPIZIDE PO Take 5 mg by mouth three (3) times daily.  sucralfate (CARAFATE) 100 mg/mL suspension Take 1 g by mouth four (4) times daily.  atorvastatin (LIPITOR) 20 mg tablet Take 1 Tab by mouth every evening.  clopidogrel (PLAVIX) 75 mg tab Take 1 Tab by mouth daily.  nitroglycerin (NITROSTAT) 0.4 mg SL tablet 1 Tab by SubLINGual route every five (5) minutes as needed for Chest Pain. No current facility-administered medications for this visit. Impression see above.       Written by Ludwig Callahan, as dictated by Radha Florence MD.

## 2017-12-14 NOTE — MR AVS SNAPSHOT
Visit Information Date & Time Provider Department Dept. Phone Encounter #  
 12/14/2017  3:00 PM Nathalie Wiggins MD [facility] [phone] [id number] Upcoming Health Maintenance Date Due Hepatitis C Screening 1945 FOOT EXAM Q1 11/6/1956 MICROALBUMIN Q1 11/6/1956 EYE EXAM RETINAL OR DILATED Q1 11/6/1956 DTaP/Tdap/Td series (1 - Tdap) 11/6/1967 FOBT Q 1 YEAR AGE 50-75 11/6/1996 ZOSTER VACCINE AGE 60> 9/6/2006 GLAUCOMA SCREENING Q2Y 11/6/2011 Pneumococcal 65+ Low/Medium Risk (1 of 2 - PCV13) 11/6/2011 MEDICARE YEARLY EXAM 11/6/2011 HEMOGLOBIN A1C Q6M 5/4/2018 LIPID PANEL Q1 11/4/2018 Allergies as of 12/14/2017  Review Complete On: 12/14/2017 By: Fuentes Peña Severity Noted Reaction Type Reactions Sulfa (Sulfonamide Antibiotics)  11/03/2017    Hives Current Immunizations  Reviewed on 11/5/2017 Name Date Influenza Vaccine (Quad) PF 11/7/2017 10:39 AM  
  
 Not reviewed this visit Vitals BP Pulse Resp Height(growth percentile) Weight(growth percentile) SpO2  
 110/60 (BP 1 Location: Left arm, BP Patient Position: Sitting) 80 16 6' 1\" (1.854 m) 189 lb (85.7 kg) 98% BMI Smoking Status 24.94 kg/m2 Never Smoker Vitals History BMI and BSA Data Body Mass Index Body Surface Area 24.94 kg/m 2 2.1 m 2 Preferred Pharmacy Pharmacy Name Phone [facility] [address] [place] [address], [address] [place] [address],[facility] Floor [phone] Your Updated Medication List  
  
   
This list is accurate as of: 12/14/17  3:52 PM.  Always use your most recent med list.  
  
  
  
  
 aspirin 81 mg chewable tablet Take 1 Tab by mouth daily. atorvastatin 20 mg tablet Commonly known as:  LIPITOR Take 1 Tab by mouth every evening. clopidogrel 75 mg Tab Commonly known as:  PLAVIX Take 1 Tab by mouth daily.   
  
 GLIPIZIDE PO  
 Take 5 mg by mouth three (3) times daily. isosorbide mononitrate ER 30 mg tablet Commonly known as:  IMDUR Take 1 Tab by mouth daily. metFORMIN 500 mg tablet Commonly known as:  GLUCOPHAGE Take 500 mg by mouth four (4) times daily (with meals). nitroglycerin 0.4 mg SL tablet Commonly known as:  NITROSTAT  
1 Tab by SubLINGual route every five (5) minutes as needed for Chest Pain. Omega-3 Fatty Acids 300 mg Cap Take  by mouth daily. red yeast rice extract 600 mg Cap Take 600 mg by mouth two (2) times a day. sucralfate 100 mg/mL suspension Commonly known as:  Juljonathan Jona Take 1 g by mouth four (4) times daily. Patient Instructions Please schedule a follow up appointment in 6 weeks with Dr. Tee Reilly. 
 
 
  
Introducing [facility] & [facility] SERVICES! Dear Kiesrten Most: Thank you for requesting a [employer] account. Our records indicate that you already have an active [employer] account. You can access your account anytime at [email or web address] Did you know that you can access your hospital and ER discharge instructions at any time in [employer]? You can also review all of your test results from your hospital stay or ER visit. Additional Information If you have questions, please visit the Frequently Asked Questions section of the [employer] website at [email or web address] Remember, [employer] is NOT to be used for urgent needs. For medical emergencies, dial 911. Now available from your iPhone and Android! Please provide this summary of care documentation to your next provider. Your primary care clinician is listed as Ashley Zayas. If you have any questions after today's visit, please call [phone].

## 2017-12-15 NOTE — TELEPHONE ENCOUNTER
Pharmacy verified. Patient stated Imdur dose has increased, please call him to confirm. He can be reached at [phone].  Thank you

## 2017-12-18 ENCOUNTER — PATIENT OUTREACH (OUTPATIENT)
Dept: CARDIOLOGY CLINIC | Age: 72
End: 2017-12-18

## 2017-12-18 RX ORDER — ISOSORBIDE MONONITRATE 30 MG/1
60 TABLET, EXTENDED RELEASE ORAL DAILY
Qty: 60 TAB | Refills: 3 | Status: SHIPPED | OUTPATIENT
Start: 2017-12-18 | End: 2018-03-01

## 2017-12-18 NOTE — TELEPHONE ENCOUNTER
Verified patient with two types of identifiers. Spoke with wife, Lacey Astudillo.  Confirmed patient, Lucía Calderon is taking Imdur 60 mg, daily    Verbal order per Dr. Earl Maciel

## 2017-12-18 NOTE — PROGRESS NOTES
NN Note:    Called Mr. Pearson per Dr. Rochelle Linder. Patient stated his BP has been running high- this am was 164/100 which is the highest it's been recently. Patient stated he checks his BP about two times per day. He also requested that his Imdur be called in to [address] ( sent request to Dr. Sony Coronado RN.) Patient stated he does occasionally have chest pain with activity but it goes away with rest. Patient stated he tries to get a minimum of 10,000 steps per day in every day. Notified Dr. Rochelle Linder of the above and will call patient back with any recommendations. Will check on patient's BP readings later in the week as well.

## 2017-12-22 ENCOUNTER — TELEPHONE (OUTPATIENT)
Dept: CARDIOLOGY CLINIC | Age: 72
End: 2017-12-22

## 2017-12-22 NOTE — TELEPHONE ENCOUNTER
Patient called and seemed very frustrated. Patient is taking 60mg Imdur daily but is taking 30mg in the morning and 30mg in the evening. Patient wants to know if he can take both pills at the same time in the morning- which I advised him was okay and that's how it is prescribed to be taken.  He does not want my approval and wants to hear it from you- Dr. Suresh Scott. He can be reached at [phone]

## 2017-12-22 NOTE — TELEPHONE ENCOUNTER
Verified patient with two types of identifiers. Called pt to inform him that per Dr. Hemal Julien patient is to take Imdur 60 mg, daily. That is 2 tablets of 30 mg together in the morning. Patient verbalizes understanding. And will call with any questions or concerns.

## 2017-12-26 NOTE — TELEPHONE ENCOUNTER
Called the patient  Wife answered phone said the patient doing better but he was in car driving  We will touch base with him later as he was driving

## 2017-12-27 ENCOUNTER — TELEPHONE (OUTPATIENT)
Dept: CARDIOLOGY CLINIC | Age: 72
End: 2017-12-27

## 2017-12-27 RX ORDER — AMLODIPINE BESYLATE 5 MG/1
5 TABLET ORAL DAILY
Qty: 30 TAB | Refills: 5 | Status: SHIPPED | OUTPATIENT
Start: 2017-12-27 | End: 2018-01-25 | Stop reason: SDUPTHER

## 2017-12-27 NOTE — TELEPHONE ENCOUNTER
Spoke with Dr. Anabel Hammonds regarding pt's BP, recommend  patient to start Norvasc 5 mg, daily. Will call to inform pt of recommendation.      Norvasc 5 mg, daily ordered   Verbal order per Dr. Anabel Hammonds

## 2017-12-27 NOTE — TELEPHONE ENCOUNTER
Verified patient with two types of identifiers. Informed pt of Dr. Chino Nina recommendation.  Patient agreed, sent Rx to [facility] pharmacy

## 2018-01-02 RX ORDER — CLOPIDOGREL BISULFATE 75 MG/1
TABLET ORAL
Qty: 30 TAB | Refills: 1 | Status: SHIPPED | OUTPATIENT
Start: 2018-01-02 | End: 2020-01-09

## 2018-01-25 ENCOUNTER — OFFICE VISIT (OUTPATIENT)
Dept: CARDIOLOGY CLINIC | Age: 73
End: 2018-01-25

## 2018-01-25 VITALS
BODY MASS INDEX: 25 KG/M2 | SYSTOLIC BLOOD PRESSURE: 110 MMHG | HEIGHT: 73 IN | OXYGEN SATURATION: 98 % | RESPIRATION RATE: 16 BRPM | DIASTOLIC BLOOD PRESSURE: 60 MMHG | WEIGHT: 188.6 LBS | HEART RATE: 84 BPM

## 2018-01-25 DIAGNOSIS — I25.10 CAD IN NATIVE ARTERY: Primary | ICD-10-CM

## 2018-01-25 DIAGNOSIS — Z82.49 FAMILY HISTORY OF CORONARY ARTERIOSCLEROSIS: ICD-10-CM

## 2018-01-25 DIAGNOSIS — E11.9 TYPE 2 DIABETES MELLITUS WITHOUT COMPLICATION, WITHOUT LONG-TERM CURRENT USE OF INSULIN (HCC): ICD-10-CM

## 2018-01-25 DIAGNOSIS — I10 HYPERTENSION, ESSENTIAL: ICD-10-CM

## 2018-01-25 DIAGNOSIS — Z95.5 S/P BARE METAL CORONARY ARTERY STENT: ICD-10-CM

## 2018-01-25 RX ORDER — AMLODIPINE BESYLATE 5 MG/1
TABLET ORAL
Qty: 45 TAB | Refills: 5 | Status: SHIPPED | OUTPATIENT
Start: 2018-01-25 | End: 2018-03-01

## 2018-01-25 RX ORDER — ROSUVASTATIN CALCIUM 10 MG/1
10 TABLET, COATED ORAL
COMMUNITY
Start: 2017-12-20 | End: 2020-07-30

## 2018-01-25 NOTE — PROGRESS NOTES
Mirsad Husejnovic     1945       Elise Naranjo MD, [facility] [place]  Date of Visit-1/25/2018   PCP is Grant Branch MD   [facility]  [facility]  HPI:  Darryle Stalker is a 71 y.o. male   Six week follow up had unstable angina with bare metal stent to the mid RCA on 11/6/17. He had continued shortness of breath and chest pain, he had a normal nuclear in follow up and did have some posterior lateral disease. He had high blood pressure which we thought was variable and related to anxiety. He called and we told him he could take the imdur 60 mg in the morning. On 12/17 they spoke to the nurse and Dr. Thiago Broussard recommended starting norvasc 5 mg a day. The pt states that he is feeling better. He notes that he is still having chest pressure when he walks. The pt states that he is walking 3-4 miles a day. He reports that this pressure isn't as severe as before. The pt states that this pressure starts to occur in the middle of his walking. He stops and rests then and this pain gets better. He describes this pain as being the same as when he came to the hospital and first got his stents but it is just not as severe. The pt reports that in the morning when he wakes up his blood pressure is 160-170 before taking his medication, after taking his medication his blood pressure is 120. The pt inquired about taking medication overnight to help control his morning blood pressure. Denies edema, syncope or shortness of breath at rest, has no tachycardia, palpitations or sense of arrhythmia. Assessment/Plan:     1. CAD   ----Bare metal stent mid RCA in November, there was some posterior lateral artery  disease, a stress test was normal but his pain persists despite a Beta blocker that he could not tolerate and Imdur he is also on a CCB. I am going to review the films with interventional cardiology.    ---In the meantime he has stopped the plavix as he is more than a month out from bare metal stent. His pain is exertional and he is taking the Imdur. 2. HTN   ---higher values in the evenings, have discussed options and we will add Norvasc 2.5 mg in the evening. They can adjust to 5 mg BID if still needed. 3. DM 2 main CV risk factor. 4. Follow up in 4 months. Future Appointments  Date Time Provider Shavon Avila   3/1/2018 4:00 PM Sophia Dillno  [address] 5/31/2018 4:20 PM Sophia Dillon  [address]      Patient Instructions   You will need to follow up in clinic with Dr. Xiao Monahan in 4 months. There were changes made to your medications at this appointment. Please note the following:  INCREASE Amlodipine 5 mg tablet, take 1 tablet in morning and 1/2 tablet in evening  I have sent an updated prescription to your pharmacy      Key CAD CHF Meds             amLODIPine (NORVASC) 5 mg tablet  (Taking) Take 1 tablet in morning, take 1/2 tablet in evening    isosorbide mononitrate ER (IMDUR) 30 mg tablet  (Taking) Take 2 Tabs by mouth daily. aspirin 81 mg chewable tablet  (Taking) Take 1 Tab by mouth daily. clopidogrel (PLAVIX) 75 mg tab TAKE 1 TABLET BY MOUTH EVERY DAY    nitroglycerin (NITROSTAT) 0.4 mg SL tablet 1 Tab by SubLINGual route every five (5) minutes as needed for Chest Pain. Impression:   1. CAD in native artery    2. Hypertension, essential    3. S/p bare metal coronary artery stent    4. Family history of coronary arteriosclerosis    5. Type 2 diabetes mellitus without complication, without long-term current use of insulin ([facility] [address]       Cardiac History:   11/4/17: TTE: EF 55-60%  NWMA. Mild LVH     ROS-except as noted above. . A complete cardiac and respiratory are reviewed and negative except as above ; Resp-denies wheezing  or productive cough,.  Const- No unusual weight loss or fever; Neuro-no recent seizure or CVA ; GI- No BRBPR, abdom pain, bloating ; - no  hematuria   see supplement sheet, initialed and to be scanned by staff  Past Medical History:   Diagnosis Date    Anemia     Dr Christina Mendoza found ooze at site of Bilroth anastomosis, was on sucralfate, saw Dr Juliana Scott CAD in native artery 11/03/2017    admit [place], RCA 99% mid, PLB prox 90% collaterals from [address] Diabetes ([facility] [address] 11/03/2017    on metformin    Family history of coronary arteriosclerosis     Hemangioma     splenectomy-Mccormick    Hypertension, essential 12/6/2017    NSTEMI (non-ST elevated myocardial infarction) ([facility] [address] 11/03/2017    troponin 0.67    PUD (peptic ulcer disease) 1977    Bilroth 2    S/p bare metal coronary artery stent 12/6/2017    Statin intolerance 11/30/2017    declined Lipitor post BMS      Social Hx= reports that he has never smoked. He has never used smokeless tobacco. He reports that he does not drink alcohol or use illicit drugs. Exam and Labs:  /60 (BP 1 Location: Left arm, BP Patient Position: Sitting)  Pulse 84  Resp 16  Ht 6' 1\" (1.854 m)  Wt 188 lb 9.6 oz (85.5 kg)  SpO2 98%  BMI 24.88 kg/r7Uprchfijuzkrle:  NAD, comfortable  Head: NC,AT. Eyes: No scleral icterus. Neck:  Neck supple. No JVD present. Throat: moist mucous membranes. Chest: Effort normal & normal respiratory excursion . Neurological: alert, conversant and oriented . Skin: Skin is not cold. No obvious systemic rash noted. Not diaphoretic. No erythema. Psychiatric:  Grossly normal mood and affect. Behavior appears normal. Extremities:  no clubbing or cyanosis. Abdomen: non distended    Lungs:breath sounds normal. No stridor. distress, wheezes or  Rales. Heart: normal rate, regular rhythm, normal S1, S2, no murmurs, rubs, clicks or gallops , PMI non displaced. Edema: Edema is none.   Lab Results   Component Value Date/Time    Cholesterol, total 212 11/04/2017 12:55 AM    HDL Cholesterol 62 11/04/2017 12:55 AM    LDL, calculated 131.4 11/04/2017 12:55 AM    Triglyceride 93 11/04/2017 12:55 AM    CHOL/HDL Ratio 3.4 11/04/2017 12:55 AM     Lab Results   Component Value Date/Time    Sodium 142 11/30/2017 03:56 PM    Potassium 4.3 11/30/2017 03:56 PM    Chloride 101 11/30/2017 03:56 PM    CO2 24 11/30/2017 03:56 PM    Anion gap 5 11/03/2017 04:55 PM    Glucose 290 11/30/2017 03:56 PM    BUN 12 11/30/2017 03:56 PM    Creatinine 0.70 11/30/2017 03:56 PM    BUN/Creatinine ratio 17 11/30/2017 03:56 PM    GFR est  11/30/2017 03:56 PM    GFR est non-AA 95 11/30/2017 03:56 PM    Calcium 9.1 11/30/2017 03:56 PM      Wt Readings from Last 3 Encounters:   01/25/18 188 lb 9.6 oz (85.5 kg)   12/14/17 189 lb (85.7 kg)   11/30/17 193 lb (87.5 kg)      BP Readings from Last 3 Encounters:   01/25/18 110/60   12/14/17 110/60   11/30/17 160/70      Current Outpatient Prescriptions   Medication Sig    rosuvastatin (CRESTOR) 10 mg tablet     amLODIPine (NORVASC) 5 mg tablet Take 1 Tab by mouth daily.  isosorbide mononitrate ER (IMDUR) 30 mg tablet Take 2 Tabs by mouth daily.  red yeast rice extract 600 mg cap Take 600 mg by mouth two (2) times a day.  aspirin 81 mg chewable tablet Take 1 Tab by mouth daily.  Omega-3 Fatty Acids 300 mg cap Take  by mouth daily.  metFORMIN (GLUCOPHAGE) 500 mg tablet Take 500 mg by mouth four (4) times daily (with meals).  GLIPIZIDE PO Take 5 mg by mouth three (3) times daily.  sucralfate (CARAFATE) 100 mg/mL suspension Take 1 g by mouth four (4) times daily.  clopidogrel (PLAVIX) 75 mg tab TAKE 1 TABLET BY MOUTH EVERY DAY    atorvastatin (LIPITOR) 20 mg tablet Take 1 Tab by mouth every evening.  nitroglycerin (NITROSTAT) 0.4 mg SL tablet 1 Tab by SubLINGual route every five (5) minutes as needed for Chest Pain. No current facility-administered medications for this visit. Impression see above.       Written by Pete Morse, as dictated by Derik Murray MD.

## 2018-01-25 NOTE — PATIENT INSTRUCTIONS
You will need to follow up in clinic with Dr. Iron Horton in 4 months. There were changes made to your medications at this appointment.  Please note the following:  INCREASE Amlodipine 5 mg tablet, take 1 tablet in morning and 1/2 tablet in evening  I have sent an updated prescription to your pharmacy

## 2018-01-25 NOTE — MR AVS SNAPSHOT
[address] [place] [address] 
[phone] Patient: Jimy Young MRN: [id number] :1945 Visit Information Date & Time Provider Department Dept. Phone Encounter #  
 2018  3:40 PM Delonte Addison MD [facility] [phone] [id number] Upcoming Health Maintenance Date Due Hepatitis C Screening 1945 FOOT EXAM Q1 1956 MICROALBUMIN Q1 1956 EYE EXAM RETINAL OR DILATED Q1 1956 DTaP/Tdap/Td series (1 - Tdap) 1967 FOBT Q 1 YEAR AGE 50-75 1996 ZOSTER VACCINE AGE 60> 2006 GLAUCOMA SCREENING Q2Y 2011 Pneumococcal 65+ Low/Medium Risk (1 of 2 - PCV13) 2011 MEDICARE YEARLY EXAM 2011 HEMOGLOBIN A1C Q6M 2018 LIPID PANEL Q1 2018 Allergies as of 2018  Review Complete On: 2018 By: Jenita Brittle Severity Noted Reaction Type Reactions Sulfa (Sulfonamide Antibiotics)  2017    Hives Current Immunizations  Reviewed on 2017 Name Date Influenza Vaccine (Quad) PF 2017 10:39 AM  
  
 Not reviewed this visit Vitals BP Pulse Resp Height(growth percentile) Weight(growth percentile) SpO2  
 110/60 (BP 1 Location: Left arm, BP Patient Position: Sitting) 84 16 6' 1\" (1.854 m) 188 lb 9.6 oz (85.5 kg) 98% BMI Smoking Status 24.88 kg/m2 Never Smoker BMI and BSA Data Body Mass Index Body Surface Area  
 24.88 kg/m 2 2.1 m 2 Preferred Pharmacy Pharmacy Name Phone [id number] Tami Menard, [address] [place] [address] [phone] Your Updated Medication List  
  
   
This list is accurate as of: 18  4:53 PM.  Always use your most recent med list. amLODIPine 5 mg tablet Commonly known as:  Gina Thomas Take 1 tablet in morning, take 1/2 tablet in evening aspirin 81 mg chewable tablet Take 1 Tab by mouth daily. atorvastatin 20 mg tablet Commonly known as:  LIPITOR Take 1 Tab by mouth every evening. clopidogrel 75 mg Tab Commonly known as:  PLAVIX TAKE 1 TABLET BY MOUTH EVERY DAY  
  
 GLIPIZIDE PO Take 5 mg by mouth three (3) times daily. isosorbide mononitrate ER 30 mg tablet Commonly known as:  IMDUR Take 2 Tabs by mouth daily. metFORMIN 500 mg tablet Commonly known as:  GLUCOPHAGE Take 500 mg by mouth four (4) times daily (with meals). nitroglycerin 0.4 mg SL tablet Commonly known as:  NITROSTAT  
1 Tab by SubLINGual route every five (5) minutes as needed for Chest Pain. Omega-3 Fatty Acids 300 mg Cap Take  by mouth daily. red yeast rice extract 600 mg Cap Take 600 mg by mouth two (2) times a day. rosuvastatin 10 mg tablet Commonly known as:  CRESTOR  
  
 sucralfate 100 mg/mL suspension Commonly known as:  Elnor Dolores Take 1 g by mouth four (4) times daily. Prescriptions Sent to Pharmacy Refills  
 amLODIPine (NORVASC) 5 mg tablet 5 Sig: Take 1 tablet in morning, take 1/2 tablet in evening Class: Normal  
 Pharmacy: [facility]/pharmacy [address] [place] [address], [address] [place] [address] Ph #: [phone] Patient Instructions You will need to follow up in clinic with Dr. Leti Campos in 4 months. There were changes made to your medications at this appointment. Please note the following: 
INCREASE Amlodipine 5 mg tablet, take 1 tablet in morning and 1/2 tablet in evening I have sent an updated prescription to your pharmacy Introducing [facility] SERVICES! Dear Nora Daly: Thank you for requesting a [facility] account. Our records indicate that you already have an active [facility] account. You can access your account anytime at [email or web address]. Join The [email or web address] Did you know that you can access your hospital and ER discharge instructions at any time in [facility]? You can also review all of your test results from your hospital stay or ER visit. Additional Information If you have questions, please visit the Frequently Asked Questions section of the [facility] website at [email or web address] Remember, [facility] is NOT to be used for urgent needs. For medical emergencies, dial 911. Now available from your iPhone and Android! Please provide this summary of care documentation to your next provider. Your primary care clinician is listed as Jimi Flores. If you have any questions after today's visit, please call [phone].

## 2018-01-25 NOTE — PROGRESS NOTES
Amlodipine 5 mg tablet, take 1 tab in am and 1/2 tab in pm ordered    Verbal order per Dr. Fields [place]

## 2018-01-30 ENCOUNTER — TELEPHONE (OUTPATIENT)
Dept: CARDIOLOGY CLINIC | Age: 73
End: 2018-01-30

## 2018-01-30 NOTE — TELEPHONE ENCOUNTER
Verified patient with two types of identifiers. Patient had questions regarding prior authorization regarding cath on Friday. Will ask Corinne Rankin to reach out to patient. Verified time of procedure with patient and reviewed instructions for procedure. Verbalizes understanding. And will call with any questions or concerns.

## 2018-02-01 ENCOUNTER — TELEPHONE (OUTPATIENT)
Dept: CARDIOLOGY CLINIC | Age: 73
End: 2018-02-01

## 2018-02-01 RX ORDER — DIPHENHYDRAMINE HYDROCHLORIDE 50 MG/ML
25 INJECTION, SOLUTION INTRAMUSCULAR; INTRAVENOUS
Status: CANCELLED | OUTPATIENT
Start: 2018-02-01 | End: 2018-02-01

## 2018-02-01 RX ORDER — SODIUM CHLORIDE 0.9 % (FLUSH) 0.9 %
5-10 SYRINGE (ML) INJECTION AS NEEDED
Status: CANCELLED | OUTPATIENT
Start: 2018-02-02

## 2018-02-01 RX ORDER — SODIUM CHLORIDE 9 MG/ML
75 INJECTION, SOLUTION INTRAVENOUS CONTINUOUS
Status: CANCELLED | OUTPATIENT
Start: 2018-02-02 | End: 2018-02-02

## 2018-02-01 RX ORDER — SODIUM CHLORIDE 0.9 % (FLUSH) 0.9 %
5-10 SYRINGE (ML) INJECTION EVERY 8 HOURS
Status: CANCELLED | OUTPATIENT
Start: 2018-02-02

## 2018-02-01 NOTE — TELEPHONE ENCOUNTER
Verified patient with two types of identifiers. Spoke with Patient's wife regarding medications. Per Dr. Iron Horton pt to not take Metformin tonight and in morning. Verbalizes understanding. Will inform patient. And will call with any questions or concerns.

## 2018-02-01 NOTE — TELEPHONE ENCOUNTER
Patient would like a return call regarding procedure, he has questions about his medication.  He can be reached at [phone] Thank you

## 2018-02-01 NOTE — TELEPHONE ENCOUNTER
Attempted to reach patient by telephone. A message was left for return call. Upon return call inform patient that it is okay to take medications per usual schedule with small sips of water.

## 2018-02-01 NOTE — TELEPHONE ENCOUNTER
Patient called to confirm he is supposed to take his normal medication tomorrow. Please call him at [phone].  Thank you

## 2018-02-02 ENCOUNTER — HOSPITAL ENCOUNTER (OUTPATIENT)
Dept: CARDIAC CATH/INVASIVE PROCEDURES | Age: 73
Discharge: HOME | End: 2018-02-02
Attending: SPECIALIST | Admitting: SPECIALIST
Payer: COMMERCIAL

## 2018-02-02 VITALS
RESPIRATION RATE: 17 BRPM | DIASTOLIC BLOOD PRESSURE: 53 MMHG | OXYGEN SATURATION: 98 % | BODY MASS INDEX: 25.18 KG/M2 | HEART RATE: 59 BPM | HEIGHT: 73 IN | WEIGHT: 190 LBS | TEMPERATURE: 98.2 F | SYSTOLIC BLOOD PRESSURE: 115 MMHG

## 2018-02-02 LAB
ANION GAP SERPL CALC-SCNC: 7 MMOL/L (ref 5–15)
BUN SERPL-MCNC: 14 MG/DL (ref 6–20)
BUN/CREAT SERPL: 21 (ref 12–20)
CALCIUM SERPL-MCNC: 8.5 MG/DL (ref 8.5–10.1)
CHLORIDE SERPL-SCNC: 105 MMOL/L (ref 97–108)
CO2 SERPL-SCNC: 31 MMOL/L (ref 21–32)
CREAT SERPL-MCNC: 0.67 MG/DL (ref 0.7–1.3)
ERYTHROCYTE [DISTWIDTH] IN BLOOD BY AUTOMATED COUNT: 14.3 % (ref 11.5–14.5)
GLUCOSE SERPL-MCNC: 169 MG/DL (ref 65–100)
HCT VFR BLD AUTO: 37.2 % (ref 36.6–50.3)
HGB BLD-MCNC: 11.8 G/DL (ref 12.1–17)
MCH RBC QN AUTO: 28.4 PG (ref 26–34)
MCHC RBC AUTO-ENTMCNC: 31.7 G/DL (ref 30–36.5)
MCV RBC AUTO: 89.4 FL (ref 80–99)
NRBC # BLD: 0 K/UL (ref 0–0.01)
NRBC BLD-RTO: 0 PER 100 WBC
PLATELET # BLD AUTO: 281 K/UL (ref 150–400)
PMV BLD AUTO: 11 FL (ref 8.9–12.9)
POTASSIUM SERPL-SCNC: 3.8 MMOL/L (ref 3.5–5.1)
RBC # BLD AUTO: 4.16 M/UL (ref 4.1–5.7)
SODIUM SERPL-SCNC: 143 MMOL/L (ref 136–145)
WBC # BLD AUTO: 7.3 K/UL (ref 4.1–11.1)

## 2018-02-02 PROCEDURE — 74011636320 HC RX REV CODE- 636/320: Performed by: SPECIALIST

## 2018-02-02 PROCEDURE — 36415 COLL VENOUS BLD VENIPUNCTURE: CPT | Performed by: SPECIALIST

## 2018-02-02 PROCEDURE — 74011000258 HC RX REV CODE- 258: Performed by: SPECIALIST

## 2018-02-02 PROCEDURE — 77030004533 HC CATH ANGI DX IMP BSC -B

## 2018-02-02 PROCEDURE — 74011250637 HC RX REV CODE- 250/637: Performed by: SPECIALIST

## 2018-02-02 PROCEDURE — C1874 STENT, COATED/COV W/DEL SYS: HCPCS

## 2018-02-02 PROCEDURE — 85027 COMPLETE CBC AUTOMATED: CPT | Performed by: SPECIALIST

## 2018-02-02 PROCEDURE — 77030013715 HC INFL SYS MRTM -B

## 2018-02-02 PROCEDURE — 93005 ELECTROCARDIOGRAM TRACING: CPT

## 2018-02-02 PROCEDURE — 74011250636 HC RX REV CODE- 250/636: Performed by: SPECIALIST

## 2018-02-02 PROCEDURE — C1894 INTRO/SHEATH, NON-LASER: HCPCS

## 2018-02-02 PROCEDURE — 74011000250 HC RX REV CODE- 250: Performed by: SPECIALIST

## 2018-02-02 PROCEDURE — 77030013744

## 2018-02-02 PROCEDURE — C1887 CATHETER, GUIDING: HCPCS

## 2018-02-02 PROCEDURE — 93458 L HRT ARTERY/VENTRICLE ANGIO: CPT

## 2018-02-02 PROCEDURE — 80048 BASIC METABOLIC PNL TOTAL CA: CPT | Performed by: SPECIALIST

## 2018-02-02 PROCEDURE — C1769 GUIDE WIRE: HCPCS

## 2018-02-02 PROCEDURE — C1760 CLOSURE DEV, VASC: HCPCS

## 2018-02-02 PROCEDURE — C1725 CATH, TRANSLUMIN NON-LASER: HCPCS

## 2018-02-02 RX ORDER — ACETAMINOPHEN 325 MG/1
650 TABLET ORAL ONCE
Status: COMPLETED | OUTPATIENT
Start: 2018-02-02 | End: 2018-02-02

## 2018-02-02 RX ORDER — MIDAZOLAM HYDROCHLORIDE 1 MG/ML
1-10 INJECTION, SOLUTION INTRAMUSCULAR; INTRAVENOUS
Status: DISCONTINUED | OUTPATIENT
Start: 2018-02-02 | End: 2018-02-02

## 2018-02-02 RX ORDER — SODIUM CHLORIDE 0.9 % (FLUSH) 0.9 %
5-10 SYRINGE (ML) INJECTION EVERY 8 HOURS
Status: DISCONTINUED | OUTPATIENT
Start: 2018-02-02 | End: 2018-02-02 | Stop reason: HOSPADM

## 2018-02-02 RX ORDER — SODIUM CHLORIDE 9 MG/ML
1.5 INJECTION, SOLUTION INTRAVENOUS CONTINUOUS
Status: DISPENSED | OUTPATIENT
Start: 2018-02-02 | End: 2018-02-02

## 2018-02-02 RX ORDER — CLOPIDOGREL BISULFATE 75 MG/1
75 TABLET ORAL DAILY
Qty: 30 TAB | Refills: 11 | Status: SHIPPED | OUTPATIENT
Start: 2018-02-02 | End: 2018-11-15

## 2018-02-02 RX ORDER — HEPARIN SODIUM 200 [USP'U]/100ML
2000 INJECTION, SOLUTION INTRAVENOUS ONCE
Status: COMPLETED | OUTPATIENT
Start: 2018-02-02 | End: 2018-02-02

## 2018-02-02 RX ORDER — DIPHENHYDRAMINE HYDROCHLORIDE 50 MG/ML
25 INJECTION, SOLUTION INTRAMUSCULAR; INTRAVENOUS
Status: DISCONTINUED | OUTPATIENT
Start: 2018-02-02 | End: 2018-02-02 | Stop reason: HOSPADM

## 2018-02-02 RX ORDER — SODIUM CHLORIDE 9 MG/ML
75 INJECTION, SOLUTION INTRAVENOUS CONTINUOUS
Status: DISCONTINUED | OUTPATIENT
Start: 2018-02-02 | End: 2018-02-02

## 2018-02-02 RX ORDER — GLUCOSAMINE SULFATE 1500 MG
1000 POWDER IN PACKET (EA) ORAL DAILY
COMMUNITY

## 2018-02-02 RX ORDER — SODIUM CHLORIDE 0.9 % (FLUSH) 0.9 %
5-10 SYRINGE (ML) INJECTION AS NEEDED
Status: DISCONTINUED | OUTPATIENT
Start: 2018-02-02 | End: 2018-02-02

## 2018-02-02 RX ORDER — SODIUM CHLORIDE 9 MG/ML
3 INJECTION, SOLUTION INTRAVENOUS CONTINUOUS
Status: DISPENSED | OUTPATIENT
Start: 2018-02-02 | End: 2018-02-02

## 2018-02-02 RX ORDER — ATROPINE SULFATE 0.1 MG/ML
0.4 INJECTION INTRAVENOUS AS NEEDED
Status: DISCONTINUED | OUTPATIENT
Start: 2018-02-02 | End: 2018-02-02

## 2018-02-02 RX ORDER — CLOPIDOGREL 300 MG/1
600 TABLET, FILM COATED ORAL AS NEEDED
Status: DISCONTINUED | OUTPATIENT
Start: 2018-02-02 | End: 2018-02-02

## 2018-02-02 RX ORDER — SODIUM CHLORIDE 0.9 % (FLUSH) 0.9 %
10 SYRINGE (ML) INJECTION AS NEEDED
Status: DISCONTINUED | OUTPATIENT
Start: 2018-02-02 | End: 2018-02-02

## 2018-02-02 RX ORDER — SODIUM CHLORIDE 0.9 % (FLUSH) 0.9 %
5-10 SYRINGE (ML) INJECTION AS NEEDED
Status: DISCONTINUED | OUTPATIENT
Start: 2018-02-02 | End: 2018-02-02 | Stop reason: HOSPADM

## 2018-02-02 RX ORDER — FENTANYL CITRATE 50 UG/ML
25-200 INJECTION, SOLUTION INTRAMUSCULAR; INTRAVENOUS
Status: DISCONTINUED | OUTPATIENT
Start: 2018-02-02 | End: 2018-02-02

## 2018-02-02 RX ORDER — HEPARIN SODIUM 1000 [USP'U]/ML
5000 INJECTION, SOLUTION INTRAVENOUS; SUBCUTANEOUS AS NEEDED
Status: DISCONTINUED | OUTPATIENT
Start: 2018-02-02 | End: 2018-02-02

## 2018-02-02 RX ORDER — LIDOCAINE HYDROCHLORIDE 10 MG/ML
5-30 INJECTION INFILTRATION; PERINEURAL AS NEEDED
Status: DISCONTINUED | OUTPATIENT
Start: 2018-02-02 | End: 2018-02-02

## 2018-02-02 RX ADMIN — MIDAZOLAM HYDROCHLORIDE 1 MG: 1 INJECTION, SOLUTION INTRAMUSCULAR; INTRAVENOUS at 11:25

## 2018-02-02 RX ADMIN — FENTANYL CITRATE 25 MCG: 50 INJECTION, SOLUTION INTRAMUSCULAR; INTRAVENOUS at 11:26

## 2018-02-02 RX ADMIN — SODIUM CHLORIDE 3 ML/KG/HR: 900 INJECTION, SOLUTION INTRAVENOUS at 09:38

## 2018-02-02 RX ADMIN — ACETAMINOPHEN 650 MG: 325 TABLET ORAL at 12:54

## 2018-02-02 RX ADMIN — IOPAMIDOL 220 ML: 755 INJECTION, SOLUTION INTRAVENOUS at 11:59

## 2018-02-02 RX ADMIN — SODIUM CHLORIDE 1.5 ML/KG/HR: 900 INJECTION, SOLUTION INTRAVENOUS at 11:06

## 2018-02-02 RX ADMIN — CLOPIDOGREL BISULFATE 600 MG: 300 TABLET, FILM COATED ORAL at 12:01

## 2018-02-02 RX ADMIN — Medication 2000 UNITS: at 11:05

## 2018-02-02 RX ADMIN — MIDAZOLAM HYDROCHLORIDE 2 MG: 1 INJECTION, SOLUTION INTRAMUSCULAR; INTRAVENOUS at 11:15

## 2018-02-02 RX ADMIN — FENTANYL CITRATE 25 MCG: 50 INJECTION, SOLUTION INTRAMUSCULAR; INTRAVENOUS at 11:15

## 2018-02-02 RX ADMIN — BIVALIRUDIN 150.85 MG/HR: 250 INJECTION, POWDER, LYOPHILIZED, FOR SOLUTION INTRAVENOUS at 10:00

## 2018-02-02 RX ADMIN — LIDOCAINE HYDROCHLORIDE 10 ML: 10 INJECTION, SOLUTION INFILTRATION; PERINEURAL at 11:24

## 2018-02-02 RX ADMIN — NITROGLYCERIN 200 MCG: 5 INJECTION, SOLUTION INTRAVENOUS at 11:59

## 2018-02-02 NOTE — IP AVS SNAPSHOT
[address] [facility] [address] [place] [address] 
[phone] Patient: Lu Fournier MRN: [id number] :1945 A check [place] indicates which time of day the medication should be taken. My Medications CHANGE how you take these medications Instructions Each Dose to Equal  
 Morning Noon Evening Bedtime * clopidogrel 75 mg Tab Commonly known as:  PLAVIX What changed:  Another medication with the same name was added. Make sure you understand how and when to take each. Your last dose was: Your next dose is: TAKE 1 TABLET BY MOUTH EVERY DAY  
     
   
   
   
  
 * clopidogrel 75 mg Tab Commonly known as:  PLAVIX What changed: You were already taking a medication with the same name, and this prescription was added. Make sure you understand how and when to take each. Your last dose was: Your next dose is: Take 1 Tab by mouth daily for 360 days. 75 mg * Notice: This list has 2 medication(s) that are the same as other medications prescribed for you. Read the directions carefully, and ask your doctor or other care provider to review them with you. CONTINUE taking these medications Instructions Each Dose to Equal  
 Morning Noon Evening Bedtime  
 amLODIPine 5 mg tablet Commonly known as:  Araceli Ferrer Your last dose was: Your next dose is: Take 1 tablet in morning, take 1/2 tablet in evening  
     
   
   
   
  
 aspirin 81 mg chewable tablet Your last dose was: Your next dose is: Take 1 Tab by mouth daily. 81 mg  
    
   
   
   
  
 GLIPIZIDE PO Your last dose was: Your next dose is: Take 5 mg by mouth three (3) times daily. 5 mg  
    
   
   
   
  
 isosorbide mononitrate ER 30 mg tablet Commonly known as:  IMDUR Your last dose was: Your next dose is: Take 2 Tabs by mouth daily. 60 mg  
    
   
   
   
  
 metFORMIN 500 mg tablet Commonly known as:  GLUCOPHAGE Your last dose was: Your next dose is: Take 500 mg by mouth four (4) times daily (with meals). 500 mg  
    
   
   
   
  
 nitroglycerin 0.4 mg SL tablet Commonly known as:  NITROSTAT Your last dose was: Your next dose is:    
   
   
 1 Tab by SubLINGual route every five (5) minutes as needed for Chest Pain. 0.4 mg Omega-3 Fatty Acids 300 mg Cap Your last dose was: Your next dose is: Take  by mouth daily. red yeast rice extract 600 mg Cap Your last dose was: Your next dose is: Take 600 mg by mouth daily. 600 mg  
    
   
   
   
  
 rosuvastatin 10 mg tablet Commonly known as:  CRESTOR Your last dose was: Your next dose is:    
   
   
      
   
   
   
  
 sucralfate 100 mg/mL suspension Commonly known as:  Bryan Sabas Your last dose was: Your next dose is: Take 1 g by mouth four (4) times daily. 1 g  
    
   
   
   
  
 VITAMIN D3 1,000 unit Cap Generic drug:  cholecalciferol Your last dose was: Your next dose is: Take 1,000 Units by mouth daily. 1000 Units Where to Get Your Medications Information on where to get these meds will be given to you by the nurse or doctor. ! Ask your nurse or doctor about these medications  
  clopidogrel 75 mg Tab

## 2018-02-02 NOTE — CARDIO/PULMONARY
Cardiac Rehab: CAD education folder given to [address] [place] [address]. Met with pt briefly in catheterization recovery. He remembers this nurse from last education visit in November. He declines cardiac rehab. Discussed program format and benefits, and he does not want to come. Discussed plavix, purpose and potential side effects. He is a non smoker and has no questions.

## 2018-02-02 NOTE — PROGRESS NOTES
Cardiac Cath Lab Recovery Arrival Note:      Michael Tran arrived to Cardiac Cath Lab, Recovery Area. Staff introduced to patient. Patient identifiers verified with NAME and DATE OF BIRTH. Procedure verified with patient. Consent forms reviewed and signed by patient or authorized representative and verified. Allergies verified. Patient informed of procedure and plan of care. Questions answered with review. Patient prepped for procedure, per orders from physician, prior to arrival.    Patient on cardiac monitor, non-invasive blood pressure, SPO2 monitor. Patient is A&Ox 4. Patient reports left chest pressure. Patient in stretcher, in low position, with side rails up, call bell within reach, patient instructed to call of assistance as needed. Patient prep in: [facility] Recovery Area, Bed# 6. Family in: waiting room. Prep by: EDITH Strange

## 2018-02-02 NOTE — IP AVS SNAPSHOT
[address] [facility] [address] 
[phone] Patient: Arnoldo Mauro MRN: [id number] :1945 About your hospitalization You were admitted on:  2018 You last received care in the:  [address], [facility]/s Dr ILIANA HUMPHREY You were discharged on:  2018 Why you were hospitalized Your primary diagnosis was:  Not on File Follow-up Information Follow up With Details Comments Contact Info Latisha Baig MD In 2 weeks  [place] [address] [address] [facility] 
[phone] Discharge Orders None A check [place] indicates which time of day the medication should be taken. My Medications CHANGE how you take these medications Instructions Each Dose to Equal  
 Morning Noon Evening Bedtime * clopidogrel 75 mg Tab Commonly known as:  PLAVIX What changed:  Another medication with the same name was added. Make sure you understand how and when to take each. Your last dose was: Your next dose is: TAKE 1 TABLET BY MOUTH EVERY DAY  
     
   
   
   
  
 * clopidogrel 75 mg Tab Commonly known as:  PLAVIX What changed: You were already taking a medication with the same name, and this prescription was added. Make sure you understand how and when to take each. Your last dose was: Your next dose is: Take 1 Tab by mouth daily for 360 days. 75 mg * Notice: This list has 2 medication(s) that are the same as other medications prescribed for you. Read the directions carefully, and ask your doctor or other care provider to review them with you. CONTINUE taking these medications Instructions Each Dose to Equal  
 Morning Noon Evening Bedtime  
 amLODIPine 5 mg tablet Commonly known as:  Deborha Cords Your last dose was: Your next dose is: Take 1 tablet in morning, take 1/2 tablet in evening  
     
   
   
   
  
 aspirin 81 mg chewable tablet Your last dose was: Your next dose is: Take 1 Tab by mouth daily. 81 mg  
    
   
   
   
  
 GLIPIZIDE PO Your last dose was: Your next dose is: Take 5 mg by mouth three (3) times daily. 5 mg  
    
   
   
   
  
 isosorbide mononitrate ER 30 mg tablet Commonly known as:  IMDUR Your last dose was: Your next dose is: Take 2 Tabs by mouth daily. 60 mg  
    
   
   
   
  
 metFORMIN 500 mg tablet Commonly known as:  GLUCOPHAGE Your last dose was: Your next dose is: Take 500 mg by mouth four (4) times daily (with meals). 500 mg  
    
   
   
   
  
 nitroglycerin 0.4 mg SL tablet Commonly known as:  NITROSTAT Your last dose was: Your next dose is:    
   
   
 1 Tab by SubLINGual route every five (5) minutes as needed for Chest Pain. 0.4 mg Omega-3 Fatty Acids 300 mg Cap Your last dose was: Your next dose is: Take  by mouth daily. red yeast rice extract 600 mg Cap Your last dose was: Your next dose is: Take 600 mg by mouth daily. 600 mg  
    
   
   
   
  
 rosuvastatin 10 mg tablet Commonly known as:  CRESTOR Your last dose was: Your next dose is:    
   
   
      
   
   
   
  
 sucralfate 100 mg/mL suspension Commonly known as:  Carmelia Gavel Your last dose was: Your next dose is: Take 1 g by mouth four (4) times daily. 1 g  
    
   
   
   
  
 VITAMIN D3 1,000 unit Cap Generic drug:  cholecalciferol Your last dose was: Your next dose is: Take 1,000 Units by mouth daily. 1000 Units Where to Get Your Medications Information on where to get these meds will be given to you by the nurse or doctor. ! Ask your nurse or doctor about these medications  
  clopidogrel 75 mg Tab Discharge Instructions Patient Discharge Instructions Lexie Monaco / [id number] : 1945 Admitted 2018 Discharged: 2018 CARDIAC CATHETERIZATION/ CATH STENT PLACEMENT  
DISCHARGE INSTRUCTIONS 
 
 
RESTART METFORMIN  If possible, have someone stay with you for the first night. It is normal to feel tired for the first couple of days. Take it easy and follow your physicians instructions on activity. CHECK THE CATHETER INSERTION SITE DAILY: If bleeding at the cath site occurs, take a clean washcloth and apply direct pressure just above the puncture site for at least 15 minutes. Call 911 immediately if the bleeding is not controlled. Continue to apply direct pressure until an ambulance gets to your location. Do not try to drive yourself or have someone else drive you to the hospital.  Have the ambulance bring you to the emergency room. You may shower 24 hours after your procedure. Gently remove the bandage during showering. Wash with soap and water and pat dry. To prevent infection, keep the groin area/insertion site clean and dry. Do not apply powders, creams, lotions, or ointments to the site for 5 days. You may cover the site with a fresh Band-Aid each day until well healed. You may notice a small lump at the site. This is normal and may last up to 6 weeks. CALL THE PHYSICIAN: 
? If the site becomes red, swollen, or feels warm to the touch, or is healing poorly ? If you note any large/extending bruise, fever >101.0 or chills ? If your extremity has numbness, tingling, discoloration, abnormal swelling, tightness or pain ? If you have difficulty with urination or develop nausea, vomiting, or severe abdominal pain ACTIVITY for the first 24-48 hours, or as instructed by your physician: 
? No lifting, pushing or pulling over 10 pounds and no straining the insertion site. Do not lift grocery bags or the garbage can; do not run the vacuum  or  for 7 days. ? You may start walking short distances the day of your procedure. Gradually increase as tolerated each day. Current activity recommendations are 30 minutes of exercise at least 5 days a week. Work up to this as you recover. ? Avoid walking outside in extremes of heat or cold. Walk inside (at home, at the mall, or at a large store) when it is cold and windy or hot and humid. THINGS TO KEEP IN MIND:  
? Do not drive, operate any machinery, or sign any legal documents for 24 hours after your procedure, or as directed by your physician. You must have someone drive you home after your procedure. ? Drink plenty of fluids for 24-48 hours after your procedure to flush the contrast dye from your kidneys. ? Limit the number of times you go up and down the stairs ? Take rests and pace yourself with activity ? Be careful and do not strain with bowel movements MEDICATIONS: 
? Take all medications as prescribed ? Call your physician if you have any questions ? Keep an updated list of your medications with you at all times and give a list to your primary physician and pharmacist 
? You may use Tylenol 325mg 1-2 tablets every 6 hours as needed for pain or discomfort, unless otherwise instructed. If you have significant discomfort more than 48 hours after your procedure, please call your physicians office. SIGNS AND SYMPTOMS: 
? Notify your physician for new or recurrent symptoms of chest discomfort, unusual shortness of breath or fatigue. These could be signs of a problem and should be discussed with your physician. ?  For significant chest pain or symptoms of angina not relieved with rest: if you have been prescribed Nitroglycerin, take as directed (taken under the tongue, one at a time 5 minutes apart for a total of 3 doses, sitting down). If the discomfort is not relieved after the 3rd Nitroglycerin, call 911. If you have not been prescribed Nitroglycerin and your chest discomfort is significant, call 911. Take the ambulance, do not try to drive yourself or have someone else drive you to the hospital.  
 
AFTER CARE: 
? Follow up with your physician as instructed ? Follow a heart healthy diet with proper portion control, daily stress management, daily exercise, blood pressure and cholesterol control, and smoking cessation. The success of your stent, if you had one placed, and the prevention of future catheterizations heavily depends on your lifestyle changes you make now! ? You may start walking short distances the day of your procedure. Gradually increase as tolerated each day. Current activity recommendations are 30 minutes of exercise at least 5 days a week. Work up to this as you recover. Walk, ride a bike, or choose any other activity you enjoy to reach this activity goal.  
? Avoid walking outside in extremes of heat or cold. Walk inside (at home, at the mall, or at a large store) when it is cold and windy or hot and humid. ? If you had a stent placed, consider Cardiac Wellness as a resource to help you make the needed lifestyle changes to live a heart healthy lifestyle. Discuss your candidacy with your physician. If you have questions, call your physicians office or the Cardiac Cath Lab at [phone]. The Cath Lab is operational from 6:30 a.m. to 5:00 p.m., Monday through Friday. After hours, notify your physician. [address] [place] [address] can be reached at [phone]. Cardiac Wellness is operational Monday-Thursday 8:30 a.m. to 5:00 p.m. and Friday 8:30 a.m. to 12:00 p.m.  
 
 
Remember:  IN CASE OF BLEEDING: KEEP FIRM PRESSURE ON THE PROCEDURE SITE AND CALL 911 IF NOT CONTROLLED Introducing [facility]! Dear Samreen Fernandez: Thank you for requesting a [facility] account. Our records indicate that you already have an active [facility] account. You can access your account anytime at [email or web address] Did you know that you can access your hospital and ER discharge instructions at any time in [facility]? You can also review all of your test results from your hospital stay or ER visit. Additional Information If you have questions, please visit the Frequently Asked Questions section of the [facility] website at [email or web address] Remember, [facility] is NOT to be used for urgent needs. For medical emergencies, dial 911. Now available from your iPhone and Android! Unresulted Labs-Please follow up with your PCP about these lab tests Order Current Status EKG, 12 LEAD, INITIAL Preliminary result Providers Seen During Your Hospitalization Provider Specialty Primary office phone Gabriella Chong MD Cardiology [phone] Your Primary Care Physician (PCP) Primary Care Physician Office Phone Office Fax Luis Antonio Page [phone] You are allergic to the following Allergen Reactions Sulfa (Sulfonamide Antibiotics) Hives Recent Documentation Height Weight BMI Smoking Status 1.854 m 86.2 kg 25.07 kg/m2 Never Smoker Emergency Contacts Name Discharge Info Relation Home Work Mobile Katie Pearson DISCHARGE CAREGIVER [3] Spouse [3] [phone] Sarahandres Araya CAREGIVER [3] Daughter [21] [phone] [place] [3] [phone] Patient Belongings The following personal items are in your possession at time of discharge: 
     Visual Aid: Glasses (reading glasses.) Please provide this summary of care documentation to your next provider. Signatures-by signing, you are acknowledging that this After Visit Summary has been reviewed with you and you have received a copy. Patient Signature:  ____________________________________________________________ Date:  ____________________________________________________________  
  
[place] Payor Provider Signature:  ____________________________________________________________ Date:  ____________________________________________________________

## 2018-02-02 NOTE — PROGRESS NOTES
Head of bed elevated 30degrees right groin groin site without bleeding and no hematoma. 1504: Zayda Burns ambulated @ 3035 (time) approximately 100 feet. Patient tolerated ambulation without adverse advents. right groin (right/left, groin/arm)  without bleeding or hematoma noted. I have reviewed discharge instructions with the patient and spouse. The patient and spouse verbalized understanding. Dr. Molly Carballo called to ask when the patient should continue metformin. Sunday was the reply and this information relayed to the patient as well as Dr. Adan Rai stating that the follow up visit is for 2 weeks, and that someone from his office will contact the patient for an appointment date and time.

## 2018-02-02 NOTE — DISCHARGE INSTRUCTIONS
Patient Discharge Instructions    Cinthia Lewis / [id number] : 1945    Admitted 2018 Discharged: 2018         CARDIAC CATHETERIZATION/ [address]      RESTART METFORMIN   If possible, have someone stay with you for the first night. It is normal to feel tired for the first couple of days. Take it easy and follow your physicians instructions on activity. CHECK THE CATHETER INSERTION SITE DAILY:    If bleeding at the cath site occurs, take a clean washcloth and apply direct pressure just above the puncture site for at least 15 minutes. Call 911 immediately if the bleeding is not controlled. Continue to apply direct pressure until an ambulance gets to your location. Do not try to drive yourself or have someone else drive you to the hospital.  Have the ambulance bring you to the emergency room. You may shower 24 hours after your procedure. Gently remove the bandage during showering. Wash with soap and water and pat dry. To prevent infection, keep the groin area/insertion site clean and dry. Do not apply powders, creams, lotions, or ointments to the site for 5 days. You may cover the site with a fresh Band-Aid each day until well healed. You may notice a small lump at the site. This is normal and may last up to 6 weeks. CALL THE PHYSICIAN:  ? If the site becomes red, swollen, or feels warm to the touch, or is healing poorly    ? If you note any large/extending bruise, fever >101.0 or chills  ? If your extremity has numbness, tingling, discoloration, abnormal swelling, tightness or pain   ? If you have difficulty with urination or develop nausea, vomiting, or severe abdominal pain    ACTIVITY for the first 24-48 hours, or as instructed by your physician:  ? No lifting, pushing or pulling over 10 pounds and no straining the insertion site.  Do not lift grocery bags or the garbage can; do not run the vacuum  or  for 7 days.  ? You may start walking short distances the day of your procedure. Gradually increase as tolerated each day. Current activity recommendations are 30 minutes of exercise at least 5 days a week. Work up to this as you recover. ? Avoid walking outside in extremes of heat or cold. Walk inside (at home, at the mall, or at a large store) when it is cold and windy or hot and humid. THINGS TO KEEP IN MIND:   ? Do not drive, operate any machinery, or sign any legal documents for 24 hours after your procedure, or as directed by your physician. You must have someone drive you home after your procedure. ? Drink plenty of fluids for 24-48 hours after your procedure to flush the contrast dye from your kidneys. ? Limit the number of times you go up and down the stairs  ? Take rests and pace yourself with activity  ? Be careful and do not strain with bowel movements    MEDICATIONS:  ? Take all medications as prescribed  ? Call your physician if you have any questions  ? Keep an updated list of your medications with you at all times and give a list to your primary physician and pharmacist  ? You may use Tylenol 325mg 1-2 tablets every 6 hours as needed for pain or discomfort, unless otherwise instructed. If you have significant discomfort more than 48 hours after your procedure, please call your physicians office. SIGNS AND SYMPTOMS:  ? Notify your physician for new or recurrent symptoms of chest discomfort, unusual shortness of breath or fatigue. These could be signs of a problem and should be discussed with your physician. ? For significant chest pain or symptoms of angina not relieved with rest:  if you have been prescribed Nitroglycerin, take as directed (taken under the tongue, one at a time 5 minutes apart for a total of 3 doses, sitting down). If the discomfort is not relieved after the 3rd Nitroglycerin, call 911.   If you have not been prescribed Nitroglycerin and your chest discomfort is significant, call 911. Take the ambulance, do not try to drive yourself or have someone else drive you to the hospital.     AFTER CARE:  ? Follow up with your physician as instructed  ? Follow a heart healthy diet with proper portion control, daily stress management, daily exercise, blood pressure and cholesterol control, and smoking cessation. The success of your stent, if you had one placed, and the prevention of future catheterizations heavily depends on your lifestyle changes you make now! ? You may start walking short distances the day of your procedure. Gradually increase as tolerated each day. Current activity recommendations are 30 minutes of exercise at least 5 days a week. Work up to this as you recover. Walk, ride a bike, or choose any other activity you enjoy to reach this activity goal.   ? Avoid walking outside in extremes of heat or cold. Walk inside (at home, at the mall, or at a large store) when it is cold and windy or hot and humid. ? If you had a stent placed, consider Cardiac Wellness as a resource to help you make the needed lifestyle changes to live a heart healthy lifestyle. Discuss your candidacy with your physician. If you have questions, call your physicians office or the Cardiac Cath Lab at [phone]. The Cath Lab is operational from 6:30 a.m. to 5:00 p.m., Monday through Friday. After hours, notify your physician. [address] [place] [address] can be reached at [phone]. Cardiac Wellness is operational Monday-Thursday 8:30 a.m. to 5:00 p.m. and Friday 8:30 a.m. to 12:00 p.m.       Remember:  IN CASE OF BLEEDING: KEEP FIRM PRESSURE ON THE PROCEDURE SITE AND CALL 911 IF NOT CONTROLLED

## 2018-02-02 NOTE — PROGRESS NOTES
1100 -   Cardiac Cath Lab Procedure Area Arrival Note:    Marjorie Arnold arrived to Cardiac Cath Lab, Procedure Area. Patient identifiers verified with NAME and DATE OF BIRTH. Procedure verified with patient. Consent forms verified. Allergies verified. Patient informed of procedure and plan of care. Questions answered with review. Patient voiced understanding of procedure and plan of care. Patient on cardiac monitor, non-invasive blood pressure, SPO2 monitor. Placed on O2 @ 2 lpm via NC.  IV of NS on pump at 129 ml/hr. Patient status doing well without problems. Patient is A&Ox 4. Patient reports no discomfort at this time. Patient medicated during procedure with orders obtained and verified by Dr. Anne Alanis. Refer to patients Cardiac Cath Lab PROCEDURE REPORT for vital signs, assessment, status, and response during procedure, printed at end of case. Printed report on chart or scanned into chart. 1214 - TRANSFER - OUT REPORT:    Verbal report given to Will CVT and Crispin RN(name) on Marjorie Arnold  being transferred to (unit) for routine post - op       Report consisted of patients Situation, Background, Assessment and   Recommendations(SBAR). Information from the following report(s) Procedure Summary and MAR was reviewed with the receiving nurse. Lines:   Peripheral IV 02/02/18 Right Antecubital (Active)        Opportunity for questions and clarification was provided.       Patient transported with:   Registered Nurse  Tech

## 2018-02-02 NOTE — PROGRESS NOTES
1216:  TRANSFER - IN REPORT:    Verbal report received from Samuel on Christiano Saundersjindio , from the Cardiac Cath lab, for routine progression of care. Report consisted of patients Situation, Background, Assessment and Recommendations(SBAR). Information from the following report(s) Procedure Summary, Intake/Output, MAR and Recent Results was reviewed with the receiving clinician. Opportunity for questions and clarification was provided. Assessment completed upon patients arrival to [address] [place] [address] and care assumed. Cardiac Cath Lab Recovery Arrival Note:     Michael Tran arrived to [facility] recovery area. Patient procedure= [facility]. Patient on cardiac monitor, non-invasive blood pressure, Patient status doing well without problems. Patient is A&Ox 4. Patient reports left chest pressure. Dr. Fernando Dumont made aware. Patient HOB elevated, ginger ale given, and EKG completed per Dr. Fernando Dumont. After the ginger ale patient states the chest pressure is better. Will continue to monitor. Procedure site without any bleeding and no hematoma.

## 2018-02-03 LAB
ATRIAL RATE: 55 BPM
CALCULATED P AXIS, ECG09: 68 DEGREES
CALCULATED R AXIS, ECG10: 35 DEGREES
CALCULATED T AXIS, ECG11: 75 DEGREES
DIAGNOSIS, 93000: NORMAL
P-R INTERVAL, ECG05: 202 MS
Q-T INTERVAL, ECG07: 448 MS
QRS DURATION, ECG06: 72 MS
QTC CALCULATION (BEZET), ECG08: 428 MS
VENTRICULAR RATE, ECG03: 55 BPM

## 2018-02-05 ENCOUNTER — TELEPHONE (OUTPATIENT)
Dept: CARDIOLOGY CLINIC | Age: 73
End: 2018-02-05

## 2018-02-05 NOTE — TELEPHONE ENCOUNTER
Attempted to reach patient by telephone. A message was left for return call. Upon return call, need to get patient scheduled for follow up visit with Dr. Sonya Presley in 2 weeks.

## 2018-02-05 NOTE — TELEPHONE ENCOUNTER
Verified patient with two types of identifiers. Spoke with wife, Suly Higgins. Patient has apt per Dr. Malvin Deutsch 3/1/18 @ 4 pm. Ana Grullon understanding. And will call with any questions or concerns.

## 2018-02-05 NOTE — TELEPHONE ENCOUNTER
----- Message from Kirstie Vazquez MD sent at 2/2/2018  2:12 PM EST -----  See me 2 weeks  Make sure he is taking his Plavix    Thanks [place] abnormal

## 2018-02-05 NOTE — TELEPHONE ENCOUNTER
Verified patient with two types of identifiers. Spoke with patient's wife, Norma Avalos. Patient is taking Plavix. Questioned if pt should be taking aspirin as well. Per Dr. Jason Mishra patient to take both Plavix and ASA. Verbalizes understanding. And will call with any questions or concerns.

## 2018-02-06 ENCOUNTER — TELEPHONE (OUTPATIENT)
Dept: CARDIOLOGY CLINIC | Age: 73
End: 2018-02-06

## 2018-02-06 NOTE — TELEPHONE ENCOUNTER
Verified patient with two types of identifiers. Patient calling in regards to bandage removal. Instructed patient that it was okay to remove dressing. Informed patient that some tenderness and bruising is expected. Call office if and swelling is noted. Instructed patient that it was okay to shower as normal using regular soap and water to cleanse gently. No tub baths or not to submerge site until fully healed. Also confirmed with patient that he should be taking both ASA and Plavix per Dr. Geronimo ocampo. And will call with any questions or concerns.

## 2018-03-01 ENCOUNTER — OFFICE VISIT (OUTPATIENT)
Dept: CARDIOLOGY CLINIC | Age: 73
End: 2018-03-01

## 2018-03-01 VITALS
BODY MASS INDEX: 25.15 KG/M2 | HEART RATE: 70 BPM | HEIGHT: 73 IN | SYSTOLIC BLOOD PRESSURE: 120 MMHG | OXYGEN SATURATION: 99 % | DIASTOLIC BLOOD PRESSURE: 60 MMHG | WEIGHT: 189.8 LBS | RESPIRATION RATE: 16 BRPM

## 2018-03-01 DIAGNOSIS — D50.0 IRON DEFICIENCY ANEMIA DUE TO CHRONIC BLOOD LOSS: ICD-10-CM

## 2018-03-01 DIAGNOSIS — I10 HYPERTENSION, ESSENTIAL: ICD-10-CM

## 2018-03-01 DIAGNOSIS — Z95.5 S/P BARE METAL CORONARY ARTERY STENT: ICD-10-CM

## 2018-03-01 DIAGNOSIS — Z95.5 S/P DRUG ELUTING CORONARY STENT PLACEMENT: ICD-10-CM

## 2018-03-01 DIAGNOSIS — Z82.49 FAMILY HISTORY OF CORONARY ARTERIOSCLEROSIS: ICD-10-CM

## 2018-03-01 DIAGNOSIS — I25.10 CAD IN NATIVE ARTERY: Primary | ICD-10-CM

## 2018-03-01 RX ORDER — LISINOPRIL 10 MG/1
10 TABLET ORAL DAILY
Qty: 30 TAB | Refills: 5 | Status: SHIPPED | OUTPATIENT
Start: 2018-03-01 | End: 2018-03-29 | Stop reason: SDUPTHER

## 2018-03-01 NOTE — PROGRESS NOTES
Lisinopril 10 mg, daily  Verbal order per Dr. Polina Burns  Per  VO to discontinue all medication not taken.

## 2018-03-01 NOTE — PROGRESS NOTES
Mathewad Husejnovic     1945       Elise Pizarro MD, [facility]  Date of Visit-3/1/2018   PCP is Apple Alvarez MD   [facility]  [facility]  HPI:  Lj Maldonado is a 71 y.o. male   Pt with coronary disease had recurrent pain even with bare metal stent to the RCA, he underwent a second procedure on 2/2/18 and had intervention on the right posterior lateral with JOEL. Continued to take plavix and aspirin as recommended and has normal blood pressure today. Overall the pt states he is doing well. The pt states that since placement of the second stent his blood pressure is a lot more controlled. He has noticed after taking imdur his blood pressure gets very low sometimes in the 90s. The pt states that since placement of second stent he has had 2 episodes of chest pain. He took nitro to relieve this and it lasted for about an hour. The pt states that this pain was similar to what he was feeling prior to the stents. There is now a big difference in the amount of pain he is getting. He notes some headaches after taking imdur. Denies edema, syncope or shortness of breath at rest, has no tachycardia, palpitations or sense of arrhythmia. EKG: NSR , first degree AV block, otherwise WNL, normal QRs    Assessment/Plan:     1. CAD Bare metal stent to mid RCA in November, persistent chest pain despite imdur, had JOEL to PLB and pain is now just once a week. Significant improvement    2. For his occasional chest pain advised that he can take more than one nitro     3. HTN   I want him to stop the imdur as that is not really for blood pressure and instead start lisinopril 10 mg a day and he will keep a BP diary     4. Reinforced need to take DAPT for one year     5. For anemia they requested lab work I have asked that they follow up with Dr. Eulogio Ling. 6. dyslipidemia LDL is 131, ?  Compliance with statin    Pt has follow up on 5/31  Future Appointments  Date Time Provider Department Center   5/31/2018 4:20 PM Elisabeth Solis  [address]       Patient Instructions   Keep follow up scheduled 5/31/18 with Dr. Leti Campos.    There were changes made to your medications at this appointment. Please note the following:  START Lisinopril 10 mg daily    STOP amlodipine (Norvasc)  STOP isosorbide (Imdur)    Check your blood pressure once a day and keep a BP diary. Call the office with update on BP after 2-3 weeks. Key CAD CHF Meds             lisinopril (PRINIVIL, ZESTRIL) 10 mg tablet  (Taking) Take 1 Tab by mouth daily. clopidogrel (PLAVIX) 75 mg tab  (Taking) Take 1 Tab by mouth daily for 360 days. rosuvastatin (CRESTOR) 10 mg tablet  (Taking)     clopidogrel (PLAVIX) 75 mg tab  (Taking) TAKE 1 TABLET BY MOUTH EVERY DAY    aspirin 81 mg chewable tablet  (Taking) Take 1 Tab by mouth daily. nitroglycerin (NITROSTAT) 0.4 mg SL tablet  (Taking) 1 Tab by SubLINGual route every five (5) minutes as needed for Chest Pain. Omega-3 Fatty Acids 300 mg cap  (Taking) Take  by mouth daily. Impression:   1. CAD in native artery    2. Hypertension, essential    3. Iron deficiency anemia due to chronic blood loss    4. S/p bare metal coronary artery stent    5. Family history of coronary arteriosclerosis    6. S/P drug eluting coronary stent placement       Cardiac History:   11/4/17: TTE: EF 55-60%  NWMA. Mild LVH     ROS-except as noted above. . A complete cardiac and respiratory are reviewed and negative except as above ; Resp-denies wheezing  or productive cough,.  Const- No unusual weight loss or fever; Neuro-no recent seizure or CVA ; GI- No BRBPR, abdom pain, bloating ; - no  hematuria   see supplement sheet, initialed and to be scanned by staff  Past Medical History:   Diagnosis Date    Anemia     Dr Pina Stafford found ooze at site of Bilroth anastomosis, was on sucralfate, saw Dr Kahlil Phillip CAD in native artery 11/03/2017    admit [place], RCA 99% mid, PLB prox 90% collaterals from Circ    Cataract     Diabetes ([facility] [address] 11/03/2017    on metformin    Family history of coronary arteriosclerosis     Hemangioma     splenectomy-Mccormick    Hypertension, essential 12/6/2017    NSTEMI (non-ST elevated myocardial infarction) ([facility] [address] 11/03/2017    troponin 0.67    PUD (peptic ulcer disease) 1977    Bilroth 2    S/p bare metal coronary artery stent 12/6/2017    Statin intolerance 11/30/2017    declined Lipitor post BMS      Social Hx= reports that he has never smoked. He has never used smokeless tobacco. He reports that he does not drink alcohol or use illicit drugs. Exam and Labs:  /60 (BP 1 Location: Left arm, BP Patient Position: Supine)  Pulse 70  Resp 16  Ht 6' 1\" (1.854 m)  Wt 189 lb 12.8 oz (86.1 kg)  SpO2 99%  BMI 25.04 kg/m2Hzyggwwrkhamam:  NAD, comfortable  Head: NC,AT. Eyes: No scleral icterus. Neck:  Neck supple. No JVD present. Throat: moist mucous membranes. Chest: Effort normal & normal respiratory excursion . Neurological: alert, conversant and oriented . Skin: Skin is not cold. No obvious systemic rash noted. Not diaphoretic. No erythema. Psychiatric:  Grossly normal mood and affect. Behavior appears normal. Extremities:  no clubbing or cyanosis. Abdomen: non distended    Lungs:breath sounds normal. No stridor. distress, wheezes or  Rales. Heart: normal rate, regular rhythm, normal S1, S2, no murmurs, rubs, clicks or gallops , PMI non displaced. Edema: Edema is none.   Lab Results   Component Value Date/Time    Cholesterol, total 212 (H) 11/04/2017 12:55 AM    HDL Cholesterol 62 11/04/2017 12:55 AM    LDL, calculated 131.4 (H) 11/04/2017 12:55 AM    Triglyceride 93 11/04/2017 12:55 AM    CHOL/HDL Ratio 3.4 11/04/2017 12:55 AM     Lab Results   Component Value Date/Time    Sodium 143 02/02/2018 09:39 AM    Potassium 3.8 02/02/2018 09:39 AM    Chloride 105 02/02/2018 09:39 AM    CO2 31 02/02/2018 09:39 AM    Anion gap 7 02/02/2018 09:39 AM Glucose 169 (H) 02/02/2018 09:39 AM    BUN 14 02/02/2018 09:39 AM    Creatinine 0.67 (L) 02/02/2018 09:39 AM    BUN/Creatinine ratio 21 (H) 02/02/2018 09:39 AM    GFR est AA >60 02/02/2018 09:39 AM    GFR est non-AA >60 02/02/2018 09:39 AM    Calcium 8.5 02/02/2018 09:39 AM      Wt Readings from Last 3 Encounters:   03/01/18 189 lb 12.8 oz (86.1 kg)   02/02/18 190 lb (86.2 kg)   01/25/18 188 lb 9.6 oz (85.5 kg)      BP Readings from Last 3 Encounters:   03/01/18 120/60   02/02/18 115/53   01/25/18 110/60      Current Outpatient Prescriptions   Medication Sig    cholecalciferol (VITAMIN D3) 1,000 unit cap Take 1,000 Units by mouth daily.  clopidogrel (PLAVIX) 75 mg tab Take 1 Tab by mouth daily for 360 days.  rosuvastatin (CRESTOR) 10 mg tablet     clopidogrel (PLAVIX) 75 mg tab TAKE 1 TABLET BY MOUTH EVERY DAY    isosorbide mononitrate ER (IMDUR) 30 mg tablet Take 2 Tabs by mouth daily. (Patient taking differently: Take 30 mg by mouth daily.)    red yeast rice extract 600 mg cap Take 600 mg by mouth daily.  aspirin 81 mg chewable tablet Take 1 Tab by mouth daily.  nitroglycerin (NITROSTAT) 0.4 mg SL tablet 1 Tab by SubLINGual route every five (5) minutes as needed for Chest Pain.  Omega-3 Fatty Acids 300 mg cap Take  by mouth daily.  metFORMIN (GLUCOPHAGE) 500 mg tablet Take 500 mg by mouth four (4) times daily (with meals).  GLIPIZIDE PO Take 5 mg by mouth three (3) times daily.  sucralfate (CARAFATE) 100 mg/mL suspension Take 1 g by mouth four (4) times daily.  amLODIPine (NORVASC) 5 mg tablet Take 1 tablet in morning, take 1/2 tablet in evening     No current facility-administered medications for this visit. Impression see above.       Written by Edgardo Lopez, as dictated by Caroline Lundberg MD.

## 2018-03-01 NOTE — MR AVS SNAPSHOT
[address] [place] 
[phone] Patient: Lu Fournier MRN: [id number] :1945 Visit Information Date & Time Provider Department Dept. Phone Encounter #  
 3/1/2018  4:00 PM Praful Green MD [facility] [phone] [id number] Your Appointments 2018  4:20 PM  
ESTABLISHED PATIENT with Praful Green MD  
[facility] ([facility] SCHEDULING) Appt Note: 4 Month Follow Up  
 [address] [place]  
[address] [facility] [address] Upcoming Health Maintenance Date Due Hepatitis C Screening 1945 FOOT EXAM Q1 1956 MICROALBUMIN Q1 1956 EYE EXAM RETINAL OR DILATED Q1 1956 DTaP/Tdap/Td series (1 - Tdap) 1967 FOBT Q 1 YEAR AGE 50-75 1996 ZOSTER VACCINE AGE 60> 2006 GLAUCOMA SCREENING Q2Y 2011 Pneumococcal 65+ Low/Medium Risk (1 of 2 - PCV13) 2011 MEDICARE YEARLY EXAM 2011 HEMOGLOBIN A1C Q6M 2018 LIPID PANEL Q1 2018 Allergies as of 3/1/2018  Review Complete On: 3/1/2018 By: Elder Segovia Severity Noted Reaction Type Reactions Sulfa (Sulfonamide Antibiotics)  2017    Hives Current Immunizations  Reviewed on 2017 Name Date Influenza Vaccine (Quad) PF 2017 10:39 AM  
  
 Not reviewed this visit You Were Diagnosed With   
  
 Codes Comments CAD in native artery    -  Primary ICD-10-CM: I25.10 ICD-9-CM: 414.01 Hypertension, essential     ICD-10-CM: I10 
ICD-9-CM: 401.9 Iron deficiency anemia due to chronic blood loss     ICD-10-CM: D50.0 ICD-9-CM: 280.0 Vitals  BP Pulse Resp Height(growth percentile) Weight(growth percentile) SpO2  
 120/60 (BP 1 Location: Left arm, BP Patient Position: Supine) 70 16 6' 1\" (1.854 m) 189 lb 12.8 oz (86.1 kg) 99% BMI Smoking Status 25.04 kg/m2 Never Smoker Vitals History BMI and BSA Data Body Mass Index Body Surface Area 25.04 kg/m 2 2.11 m 2 Preferred Pharmacy Pharmacy Name Phone [id number] Kimberly Ling, [address] [facility] [phone] Your Updated Medication List  
  
   
This list is accurate as of 3/1/18  4:51 PM.  Always use your most recent med list.  
  
  
  
  
 aspirin 81 mg chewable tablet Take 1 Tab by mouth daily. * clopidogrel 75 mg Tab Commonly known as:  PLAVIX TAKE 1 TABLET BY MOUTH EVERY DAY  
  
 * clopidogrel 75 mg Tab Commonly known as:  PLAVIX Take 1 Tab by mouth daily for 360 days. GLIPIZIDE PO Take 5 mg by mouth three (3) times daily. lisinopril 10 mg tablet Commonly known as:  Real Headings Take 1 Tab by mouth daily. metFORMIN 500 mg tablet Commonly known as:  GLUCOPHAGE Take 500 mg by mouth four (4) times daily (with meals). nitroglycerin 0.4 mg SL tablet Commonly known as:  NITROSTAT  
1 Tab by SubLINGual route every five (5) minutes as needed for Chest Pain. Omega-3 Fatty Acids 300 mg Cap Take  by mouth daily. red yeast rice extract 600 mg Cap Take 600 mg by mouth daily. rosuvastatin 10 mg tablet Commonly known as:  CRESTOR  
  
 sucralfate 100 mg/mL suspension Commonly known as:  Modesta Shutters Take 1 g by mouth four (4) times daily. VITAMIN D3 1,000 unit Cap Generic drug:  cholecalciferol Take 1,000 Units by mouth daily. * Notice: This list has 2 medication(s) that are the same as other medications prescribed for you. Read the directions carefully, and ask your doctor or other care provider to review them with you. Prescriptions Sent to Pharmacy Refills  
 lisinopril (PRINIVIL, ZESTRIL) 10 mg tablet 5 Sig: Take 1 Tab by mouth daily. Class: Normal  
 [facility], [address] [place] [address] #: [phone] Route: Oral  
  
We Performed the Following AMB POC EKG ROUTINE W/ 12 LEADS, INTER & REP [95107 CPT(R)] Patient Instructions Keep follow up scheduled 5/31/18 with Dr. Derrek Gilford. 
 
There were changes made to your medications at this appointment. Please note the following: START Lisinopril 10 mg daily STOP amlodipine (Norvasc) STOP isosorbide (Imdur) Check your blood pressure once a day and keep a BP diary. Call the office with update on BP after 2-3 weeks. Introducing [facility] SERVICES! Dear Isabela Fleming: Thank you for requesting a [employer] account. Our records indicate that you already have an active EC[employer] account. You can access your account anytime at [email or web address] Did you know that you can access your hospital and ER discharge instructions at any time in EC[employer]? You can also review all of your test results from your hospital stay or ER visit. Additional Information If you have questions, please visit the Frequently Asked Questions section of the EC[employer] website at [email or web address] Remember, [employer] is NOT to be used for urgent needs. For medical emergencies, dial 911. Now available from your iPhone and Android! Please provide this summary of care documentation to your next provider. Your primary care clinician is listed as Ophelia Underwood. If you have any questions after today's visit, please call [phone].

## 2018-03-01 NOTE — PATIENT INSTRUCTIONS
Keep follow up scheduled 5/31/18 with Dr. Ricarda Zelaya.    There were changes made to your medications at this appointment. Please note the following:  START Lisinopril 10 mg daily    STOP amlodipine (Norvasc)  STOP isosorbide (Imdur)    Check your blood pressure once a day and keep a BP diary. Call the office with update on BP after 2-3 weeks.

## 2018-03-25 PROBLEM — Z95.5 S/P DRUG ELUTING CORONARY STENT PLACEMENT: Status: ACTIVE | Noted: 2018-03-25

## 2018-03-26 ENCOUNTER — TELEPHONE (OUTPATIENT)
Dept: CARDIOLOGY CLINIC | Age: 73
End: 2018-03-26

## 2018-03-26 NOTE — TELEPHONE ENCOUNTER
Verified patient with two types of identifiers. Patient stated that medication is not working. Reports that his average BP is 146/81. Requested that patient call office with recent BP reading to report to MD. Patient states that he will call office tomorrow with updates. Verbalizes understanding. And will call with any questions or concerns.

## 2018-03-26 NOTE — TELEPHONE ENCOUNTER
Patient needs to speak with you regarding his lisinopril, he said its not helping him   Phone: [phone]

## 2018-03-28 NOTE — TELEPHONE ENCOUNTER
Verified patient with two types of identifiers. Patient states that his average BP is 146/81. Usually SBP is over 130. Patient confirms that BP readings are with taking lisinopril. 3/27 /78  3/28 /76    Wants to know if there is another medication to manage BP? Will ask MD and return call to patient with recommendation.

## 2018-03-29 RX ORDER — LISINOPRIL 20 MG/1
20 TABLET ORAL DAILY
Qty: 30 TAB | Refills: 5 | Status: SHIPPED | OUTPATIENT
Start: 2018-03-29 | End: 2018-09-22 | Stop reason: SDUPTHER

## 2018-03-29 NOTE — TELEPHONE ENCOUNTER
Verified patient with two types of identifiers. Informed patient of Dr. Marlen Fuentes recommendation. Verbalizes understanding. And will call with any questions or concerns.       Lisinopril 20 mg, daily ordered per Dr. Ny Been

## 2018-05-31 ENCOUNTER — OFFICE VISIT (OUTPATIENT)
Dept: CARDIOLOGY CLINIC | Age: 73
End: 2018-05-31

## 2018-05-31 VITALS
WEIGHT: 184.8 LBS | BODY MASS INDEX: 24.49 KG/M2 | OXYGEN SATURATION: 97 % | RESPIRATION RATE: 16 BRPM | DIASTOLIC BLOOD PRESSURE: 60 MMHG | SYSTOLIC BLOOD PRESSURE: 100 MMHG | HEIGHT: 73 IN | HEART RATE: 76 BPM

## 2018-05-31 DIAGNOSIS — Z95.5 S/P DRUG ELUTING CORONARY STENT PLACEMENT: ICD-10-CM

## 2018-05-31 DIAGNOSIS — I10 HYPERTENSION, ESSENTIAL: ICD-10-CM

## 2018-05-31 DIAGNOSIS — D50.8 OTHER IRON DEFICIENCY ANEMIA: ICD-10-CM

## 2018-05-31 DIAGNOSIS — I25.10 CAD IN NATIVE ARTERY: Primary | ICD-10-CM

## 2018-05-31 DIAGNOSIS — Z95.5 S/P BARE METAL CORONARY ARTERY STENT: ICD-10-CM

## 2018-05-31 NOTE — MR AVS SNAPSHOT
[address] [facility] [address] [facility] 
[phone] Patient: Edward Lainez MRN: [id number] :1945 Visit Information Date & Time Provider Department Dept. Phone Encounter #  
 2018  4:20 PM Sandra Oliva MD [facility] [phone] [id number] Upcoming Health Maintenance Date Due Hepatitis C Screening 1945 FOOT EXAM Q1 1956 MICROALBUMIN Q1 1956 EYE EXAM RETINAL OR DILATED Q1 1956 DTaP/Tdap/Td series (1 - Tdap) 1967 FOBT Q 1 YEAR AGE 50-75 1996 ZOSTER VACCINE AGE 60> 2006 GLAUCOMA SCREENING Q2Y 2011 Pneumococcal 65+ Low/Medium Risk (1 of 2 - PCV13) 2011 MEDICARE YEARLY EXAM 3/28/2018 HEMOGLOBIN A1C Q6M 2018 Influenza Age 5 to Adult 2018 LIPID PANEL Q1 2018 Allergies as of 2018  Review Complete On: 2018 By: Victorino Loera Severity Noted Reaction Type Reactions Sulfa (Sulfonamide Antibiotics)  2017    Hives Current Immunizations  Reviewed on 2017 Name Date Influenza Vaccine (Quad) PF 2017 10:39 AM  
  
 Not reviewed this visit You Were Diagnosed With   
  
 Codes Comments CAD in native artery    -  Primary ICD-10-CM: I25.10 ICD-9-CM: 414.01 Vitals BP Pulse Resp Height(growth percentile) Weight(growth percentile) SpO2  
 100/60 (BP 1 Location: Left arm, BP Patient Position: Sitting) 76 16 6' 1\" (1.854 m) 184 lb 12.8 oz (83.8 kg) 97% BMI Smoking Status 24.38 kg/m2 Never Smoker Vitals History BMI and BSA Data Body Mass Index Body Surface Area  
 24.38 kg/m 2 2.08 m 2 Preferred Pharmacy Pharmacy Name Phone [id number] Fernie Faustin, [address] [facility] [phone] Your Updated Medication List  
  
   
 This list is accurate as of 5/31/18  4:54 PM.  Always use your most recent med list.  
  
  
  
  
 aspirin 81 mg chewable tablet Take 1 Tab by mouth daily. * clopidogrel 75 mg Tab Commonly known as:  PLAVIX TAKE 1 TABLET BY MOUTH EVERY DAY  
  
 * clopidogrel 75 mg Tab Commonly known as:  PLAVIX Take 1 Tab by mouth daily for 360 days. GLIPIZIDE PO Take 5 mg by mouth three (3) times daily. lisinopril 20 mg tablet Commonly known as:  Aleksandar Carrie Take 1 Tab by mouth daily. metFORMIN 500 mg tablet Commonly known as:  GLUCOPHAGE Take 500 mg by mouth four (4) times daily (with meals). nitroglycerin 0.4 mg SL tablet Commonly known as:  NITROSTAT  
1 Tab by SubLINGual route every five (5) minutes as needed for Chest Pain. Omega-3 Fatty Acids 300 mg Cap Take  by mouth daily. red yeast rice extract 600 mg Cap Take 600 mg by mouth daily. rosuvastatin 10 mg tablet Commonly known as:  CRESTOR  
  
 sucralfate 100 mg/mL suspension Commonly known as:  Adelaida Geralds Take 1 g by mouth four (4) times daily. VITAMIN D3 1,000 unit Cap Generic drug:  cholecalciferol Take 1,000 Units by mouth daily. * Notice: This list has 2 medication(s) that are the same as other medications prescribed for you. Read the directions carefully, and ask your doctor or other care provider to review them with you. We Performed the Following AMB POC EKG ROUTINE W/ 12 LEADS, INTER & REP [87937 CPT(R)] Patient Instructions You will need to follow up in clinic with Dr. Marisela Mckeon in 6 months. Introducing [facility] SERVICES! Dear Linda Lopez: Thank you for requesting a Open-[employer] account. Our records indicate that you already have an active Open-[employer] account. You can access your account anytime at [email or web address] Did you know that you can access your hospital and ER discharge instructions at any time in [employer]? You can also review all of your test results from your hospital stay or ER visit. Additional Information If you have questions, please visit the Frequently Asked Questions section of the [employer] website at [email or web address] Remember, [employer] is NOT to be used for urgent needs. For medical emergencies, dial 911. Now available from your iPhone and Android! Please provide this summary of care documentation to your next provider. Your primary care clinician is listed as Aroldo Pearce. If you have any questions after today's visit, please call [phone].

## 2018-05-31 NOTE — PROGRESS NOTES
Mathewad Husejnovic     1945       Elise Lindsay MD, [facility]  Date of Visit-5/31/2018   PCP is Filipe De Los Santos MD   [facility] [place]  [facility]  HPI:  Faye Gonzales is a 71 y.o. male   CAD, BMS 2/2018 with right posterior lateral. Started lisinopril at last visit and now continues on DAPT, ACE and high potency statin. The pt came with a list of questions. He has been monitoring his blood pressure at home and it has been varying 120-140. The pt states that he feels weak due to the plavix and aspirin as he is mildly anemic. He had labs with Filipe De Los Santos MD and his Hgb was 12.2 on 5/11/18 and creatine was 0.64. The pt has been taking iron and Carafate. His cholesterol is 124 on 5/18 and LDL is 55.     e reports that he is staying active with a minimum of 10,000 steps. The pt reports that when he is walking up a hill he has pain in his chest and a burning in his throat. He states that when this pain occurs he has to slow down and stop. On flat ground he is feeling well. The pt states that he has been trying to learn about his limitations. He reports that he is still carrying his nitro with him. The pt states that he is planning on traveling soon. Denies edema, syncope or shortness of breath at rest, has no tachycardia, palpitations or sense of arrhythmia. EKG- NSR at 69  first degree AVB normal QTC and QRS    Assessment/Plan:     1. BMS RCA 11/2017, JOEL RPL 2/2018. CAD with stable angina. Continue current meds     2. HTN at goal continue lisinopril   See bp diary results     3. DAPT minimum 6 months which would be August, I have recommended one year, he may stop at 6 months if he desires.      4. Anemia, essentially normal.     5. High potency, dramatic halving of LDL as would be expected of rosuvastatin pt now compliant with statin   Has some tolerable ache in legs  DM2- cardiovascular disease risk factors keep close fu with PCP Dakota Unger Audrey Stubbs MD   6. Follow up in 6 months. Future Appointments  Date Time Provider Shavon Avila   11/15/2018 3:40 PM Paulette Munoz  [address]      Patient Instructions   You will need to follow up in clinic with Dr. Teodora Berkowitz in 6 months. Key CAD CHF Meds             lisinopril (PRINIVIL, ZESTRIL) 20 mg tablet  (Taking) Take 1 Tab by mouth daily. clopidogrel (PLAVIX) 75 mg tab  (Taking) Take 1 Tab by mouth daily for 360 days. rosuvastatin (CRESTOR) 10 mg tablet  (Taking)     clopidogrel (PLAVIX) 75 mg tab  (Taking) TAKE 1 TABLET BY MOUTH EVERY DAY    aspirin 81 mg chewable tablet  (Taking) Take 1 Tab by mouth daily. nitroglycerin (NITROSTAT) 0.4 mg SL tablet  (Taking) 1 Tab by SubLINGual route every five (5) minutes as needed for Chest Pain. Omega-3 Fatty Acids 300 mg cap  (Taking) Take  by mouth daily. Impression:   1. CAD in native artery    2. Hypertension, essential    3. S/P drug eluting coronary stent placement    4. S/p bare metal coronary artery stent    5. Other iron deficiency anemia       Cardiac History:   11/4/17: TTE: EF 55-60%  NWMA. Mild LVH     ROS-except as noted above. . A complete cardiac and respiratory are reviewed and negative except as above ; Resp-denies wheezing  or productive cough,.  Const- No unusual weight loss or fever; Neuro-no recent seizure or CVA ; GI- No BRBPR, abdom pain, bloating ; - no  hematuria   see supplement sheet, initialed and to be scanned by staff  Past Medical History:   Diagnosis Date    Anemia     Dr Miranda Carrera found ooze at site of Bilroth anastomosis, was on sucralfate, saw Dr Lamont Weaver CAD in native artery 11/03/2017    admit [place], RCA 99% mid, PLB prox 90% collaterals from [address] Diabetes ([facility] [address] 11/03/2017    on metformin    Family history of coronary arteriosclerosis     Hemangioma     splenectomy-Mccormick    Hypertension, essential 12/6/2017    NSTEMI (non-ST elevated myocardial infarction) ([facility] [address] 11/03/2017    troponin 0.67    PUD (peptic ulcer disease) 1977    Bilroth 2    S/p bare metal coronary artery stent 12/6/2017    S/P drug eluting coronary stent placement 3/25/2018    Statin intolerance 11/30/2017    declined Lipitor post BMS      Social Hx= reports that he has never smoked. He has never used smokeless tobacco. He reports that he does not drink alcohol or use illicit drugs. Exam and Labs:  /60 (BP 1 Location: Left arm, BP Patient Position: Sitting)  Pulse 76  Resp 16  Ht 6' 1\" (1.854 m)  Wt 184 lb 12.8 oz (83.8 kg)  SpO2 97%  BMI 24.38 kg/[id number]:  NAD, comfortable  Head: NC,AT. Eyes: No scleral icterus. Neck:  Neck supple. No JVD present. Throat: moist mucous membranes. Chest: Effort normal & normal respiratory excursion . Neurological: alert, conversant and oriented . Skin: Skin is not cold. No obvious systemic rash noted. Not diaphoretic. No erythema. Psychiatric:  Grossly normal mood and affect. Behavior appears normal. Extremities:  no clubbing or cyanosis. Abdomen: non distended    Lungs:breath sounds normal. No stridor. distress, wheezes or  Rales. Heart: normal rate, regular rhythm, normal S1, S2, no murmurs, rubs, clicks or gallops , PMI non displaced. Edema: Edema is none.   Lab Results   Component Value Date/Time    Cholesterol, total 212 (H) 11/04/2017 12:55 AM    HDL Cholesterol 62 11/04/2017 12:55 AM    LDL, calculated 131.4 (H) 11/04/2017 12:55 AM    Triglyceride 93 11/04/2017 12:55 AM    CHOL/HDL Ratio 3.4 11/04/2017 12:55 AM     Lab Results   Component Value Date/Time    Sodium 143 02/02/2018 09:39 AM    Potassium 3.8 02/02/2018 09:39 AM    Chloride 105 02/02/2018 09:39 AM    CO2 31 02/02/2018 09:39 AM    Anion gap 7 02/02/2018 09:39 AM    Glucose 169 (H) 02/02/2018 09:39 AM    BUN 14 02/02/2018 09:39 AM    Creatinine 0.67 (L) 02/02/2018 09:39 AM    BUN/Creatinine ratio 21 (H) 02/02/2018 09:39 AM    GFR est AA >60 02/02/2018 09:39 AM    GFR est non-AA >60 02/02/2018 09:39 AM    Calcium 8.5 02/02/2018 09:39 AM      Wt Readings from Last 3 Encounters:   05/31/18 184 lb 12.8 oz (83.8 kg)   03/01/18 189 lb 12.8 oz (86.1 kg)   02/02/18 190 lb (86.2 kg)      BP Readings from Last 3 Encounters:   05/31/18 100/60   03/01/18 120/60   02/02/18 115/53      Current Outpatient Prescriptions   Medication Sig    lisinopril (PRINIVIL, ZESTRIL) 20 mg tablet Take 1 Tab by mouth daily.  cholecalciferol (VITAMIN D3) 1,000 unit cap Take 1,000 Units by mouth daily.  clopidogrel (PLAVIX) 75 mg tab Take 1 Tab by mouth daily for 360 days.  rosuvastatin (CRESTOR) 10 mg tablet     clopidogrel (PLAVIX) 75 mg tab TAKE 1 TABLET BY MOUTH EVERY DAY    red yeast rice extract 600 mg cap Take 600 mg by mouth daily.  aspirin 81 mg chewable tablet Take 1 Tab by mouth daily.  nitroglycerin (NITROSTAT) 0.4 mg SL tablet 1 Tab by SubLINGual route every five (5) minutes as needed for Chest Pain.  Omega-3 Fatty Acids 300 mg cap Take  by mouth daily.  metFORMIN (GLUCOPHAGE) 500 mg tablet Take 500 mg by mouth four (4) times daily (with meals).  GLIPIZIDE PO Take 5 mg by mouth three (3) times daily.  sucralfate (CARAFATE) 100 mg/mL suspension Take 1 g by mouth four (4) times daily. No current facility-administered medications for this visit. Impression see above.       Written by Kimberly Mcduffie, as dictated by Brian Story MD.

## 2018-09-24 RX ORDER — LISINOPRIL 20 MG/1
TABLET ORAL
Qty: 90 TAB | Refills: 1 | Status: SHIPPED | OUTPATIENT
Start: 2018-09-24 | End: 2019-03-24 | Stop reason: SDUPTHER

## 2018-09-24 NOTE — TELEPHONE ENCOUNTER
Requested Prescriptions     Signed Prescriptions Disp Refills    lisinopril (PRINIVIL, ZESTRIL) 20 mg tablet 90 Tab 1     Sig: TAKE 1 TABLET BY MOUTH EVERY DAY     Authorizing Provider: Sae Ansari     Ordering User: Milena Barron     Verbal order per LILLY Giraldo. Follow up with Dr. Kashif Raymond scheduled on 11-15-18.

## 2018-11-09 NOTE — PROGRESS NOTES
Mirsad Husejnovic     1945       Elise Pizarro MD, [facility]  Date of Visit-11/15/2018   PCP is Mary Powers MD   [facility] [place]  [facility]  HPI:  Ashli Interiano is a 68 y.o. male   Follow up of CAD and BMS 2018 right posterior lateral.  Stents to the RPL 2/18 and RCA 11/ 2017. Pt is present with his wife. Pt is experiencing a little CP which only happened once when he was walking. Pt also states that his breathing has been fine. Pt and pt's wife both said the pt has high BP (140 - 150's as stated) on some evenings. Pt's wife is unsure whether it was due to pt's diet that day. Pt feels less energetic nowadays, where he states that he feels specifically 25% less energetic than he used to.  Pt's wife states that pt would experience pain from everywhere when waking up in the morning, where the pt states that he specifically feels a burning sensation in his right shoulder. Pt had tried cream and thought it was making him feel better. Denies  edema, syncope or shortness of breath at rest, has no tachycardia, palpitations or sense of arrhythmia. EKG: Sinus rhythm WNL rate 72 QRS 88    Assessment/Plan:     1. CAD with bare metal stent to the RCA in November a year ago and drug eluting stent to the right PL in februrary. It's a bit hard to understand him but I don't think he's having any angina. The pain that he had in the right shoulder burning seems atypical and got better with arthritis cream.  He had one other pain when walking but did not have to take nitro. They note some abnormalities in the last 3 weeks but it was very nonspecific. Continue current meds but can stop Plavix in February. 2. HTN. Occasional isolated elevation but generally normal.   BP Readings from Last 3 Encounters:   11/15/18 110/52   05/31/18 100/60   03/01/18 120/60      3. Anemia. Resolved. -improved his MCKENZIE  4. Dyslipidemia.   LDL has improved was 55 on 5/18 repeat lipids. 5. DM2. Follow up with PCP. 6. Leg ache. Not clear if this is statin myopathy, seems benign. 7. Generalized weakness. Again very vague sx no occurrent source there may be component of anxiety. 8. Follow up in 6 months. Future Appointments   Date Time Provider Shvaon Margarita   5/16/2019  4:20 PM Jazzmine Larios  [address]      Patient Instructions   Please schedule a follow up with Dr. Madison Cadet in 6 months. Please get fasting blood work done. You can stop your Plavix in February. Key CAD CHF Meds             lisinopril (PRINIVIL, ZESTRIL) 20 mg tablet  (Taking) TAKE 1 TABLET BY MOUTH EVERY DAY    rosuvastatin (CRESTOR) 10 mg tablet  (Taking)     clopidogrel (PLAVIX) 75 mg tab  (Taking) TAKE 1 TABLET BY MOUTH EVERY DAY    aspirin 81 mg chewable tablet  (Taking) Take 1 Tab by mouth daily. nitroglycerin (NITROSTAT) 0.4 mg SL tablet  (Taking) 1 Tab by SubLINGual route every five (5) minutes as needed for Chest Pain. Omega-3 Fatty Acids 300 mg cap  (Taking) Take  by mouth daily. Impression:   1. Coronary artery disease involving native coronary artery of native heart without angina pectoris    2. Hypertension, essential    3. S/P drug eluting coronary stent placement    4. S/p bare metal coronary artery stent    5. Iron deficiency anemia due to chronic blood loss    6. Family history of coronary arteriosclerosis    7. Essential hypertension       Cardiac History:   11/4/17: TTE: EF 55-60%  NWMA. Mild LVH     ROS-except as noted above. . A complete cardiac and respiratory are reviewed and negative except as above ; Resp-denies wheezing  or productive cough,.  Const- No unusual weight loss or fever; Neuro-no recent seizure or CVA ; GI- No BRBPR, abdom pain, bloating ; - no  hematuria   see supplement sheet, initialed and to be scanned by staff  Past Medical History:   Diagnosis Date    Anemia     Dr Nina Otero found ooze at site of Bilroth anastomosis, was on sucralfate, saw Dr Elizabeth Childers CAD in native artery 11/03/2017    admit [place], RCA 99% mid, PLB prox 90% collaterals from [address] Diabetes ([facility] [address] 11/03/2017    on metformin    Family history of coronary arteriosclerosis     Hemangioma     splenectomy-Mccormick    Hypertension, essential 12/6/2017    NSTEMI (non-ST elevated myocardial infarction) ([facility] [address] 11/03/2017    troponin 0.67    PUD (peptic ulcer disease) 1977    Bilroth 2    S/p bare metal coronary artery stent 12/6/2017    S/P drug eluting coronary stent placement 3/25/2018    Statin intolerance 11/30/2017    declined Lipitor post BMS      Social Hx= reports that  has never smoked. he has never used smokeless tobacco. He reports that he does not drink alcohol or use drugs. Exam and Labs:  /52 (BP 1 Location: Left arm, BP Patient Position: Sitting)   Pulse 72   Resp 16   Ht 6' 1\" (1.854 m)   Wt 180 lb 3.2 oz (81.7 kg)   SpO2 98%   BMI 23.77 kg/m² Constitutional:  NAD, comfortable  Head: NC,AT. Eyes: No scleral icterus. Neck:  Neck supple. No JVD present. Throat: moist mucous membranes. Chest: Effort normal & normal respiratory excursion . Neurological: alert, conversant and oriented . Skin: Skin is not cold. No obvious systemic rash noted. Not diaphoretic. No erythema. Psychiatric:  Grossly normal mood and affect. Behavior appears normal. Extremities:  no clubbing or cyanosis. Abdomen: non distended    Lungs:breath sounds normal. No stridor. distress, wheezes or  Rales. Heart: normal rate, regular rhythm, normal S1, S2, no murmurs, rubs, clicks or gallops , PMI non displaced. Edema: Edema is none.   Lab Results   Component Value Date/Time    Cholesterol, total 212 (H) 11/04/2017 12:55 AM    HDL Cholesterol 62 11/04/2017 12:55 AM    LDL, calculated 131.4 (H) 11/04/2017 12:55 AM    Triglyceride 93 11/04/2017 12:55 AM    CHOL/HDL Ratio 3.4 11/04/2017 12:55 AM     Lab Results   Component Value Date/Time    Sodium 143 02/02/2018 09:39 AM    Potassium 3.8 02/02/2018 09:39 AM    Chloride 105 02/02/2018 09:39 AM    CO2 31 02/02/2018 09:39 AM    Anion gap 7 02/02/2018 09:39 AM    Glucose 169 (H) 02/02/2018 09:39 AM    BUN 14 02/02/2018 09:39 AM    Creatinine 0.67 (L) 02/02/2018 09:39 AM    BUN/Creatinine ratio 21 (H) 02/02/2018 09:39 AM    GFR est AA >60 02/02/2018 09:39 AM    GFR est non-AA >60 02/02/2018 09:39 AM    Calcium 8.5 02/02/2018 09:39 AM      Wt Readings from Last 3 Encounters:   11/15/18 180 lb 3.2 oz (81.7 kg)   05/31/18 184 lb 12.8 oz (83.8 kg)   03/01/18 189 lb 12.8 oz (86.1 kg)      BP Readings from Last 3 Encounters:   11/15/18 110/52   05/31/18 100/60   03/01/18 120/60      Current Outpatient Medications   Medication Sig    lisinopril (PRINIVIL, ZESTRIL) 20 mg tablet TAKE 1 TABLET BY MOUTH EVERY DAY    cholecalciferol (VITAMIN D3) 1,000 unit cap Take 1,000 Units by mouth daily.  rosuvastatin (CRESTOR) 10 mg tablet     clopidogrel (PLAVIX) 75 mg tab TAKE 1 TABLET BY MOUTH EVERY DAY    red yeast rice extract 600 mg cap Take 600 mg by mouth daily.  aspirin 81 mg chewable tablet Take 1 Tab by mouth daily.  nitroglycerin (NITROSTAT) 0.4 mg SL tablet 1 Tab by SubLINGual route every five (5) minutes as needed for Chest Pain.  Omega-3 Fatty Acids 300 mg cap Take  by mouth daily.  metFORMIN (GLUCOPHAGE) 500 mg tablet Take 500 mg by mouth four (4) times daily (with meals).  GLIPIZIDE PO Take 5 mg by mouth three (3) times daily.  sucralfate (CARAFATE) 100 mg/mL suspension Take 1 g by mouth four (4) times daily. No current facility-administered medications for this visit. Impression see above.       Written by Maebelle Goldberg, as dictated by Kanwal Hearn MD.

## 2018-11-15 ENCOUNTER — OFFICE VISIT (OUTPATIENT)
Dept: CARDIOLOGY CLINIC | Age: 73
End: 2018-11-15

## 2018-11-15 VITALS
OXYGEN SATURATION: 98 % | SYSTOLIC BLOOD PRESSURE: 110 MMHG | RESPIRATION RATE: 16 BRPM | HEIGHT: 73 IN | HEART RATE: 72 BPM | DIASTOLIC BLOOD PRESSURE: 52 MMHG | BODY MASS INDEX: 23.88 KG/M2 | WEIGHT: 180.2 LBS

## 2018-11-15 DIAGNOSIS — I10 HYPERTENSION, ESSENTIAL: ICD-10-CM

## 2018-11-15 DIAGNOSIS — I25.10 CORONARY ARTERY DISEASE INVOLVING NATIVE CORONARY ARTERY OF NATIVE HEART WITHOUT ANGINA PECTORIS: Primary | ICD-10-CM

## 2018-11-15 DIAGNOSIS — I10 ESSENTIAL HYPERTENSION: ICD-10-CM

## 2018-11-15 DIAGNOSIS — Z95.5 S/P DRUG ELUTING CORONARY STENT PLACEMENT: ICD-10-CM

## 2018-11-15 DIAGNOSIS — D50.0 IRON DEFICIENCY ANEMIA DUE TO CHRONIC BLOOD LOSS: ICD-10-CM

## 2018-11-15 DIAGNOSIS — Z82.49 FAMILY HISTORY OF CORONARY ARTERIOSCLEROSIS: ICD-10-CM

## 2018-11-15 DIAGNOSIS — Z95.5 S/P BARE METAL CORONARY ARTERY STENT: ICD-10-CM

## 2019-03-25 RX ORDER — LISINOPRIL 20 MG/1
TABLET ORAL
Qty: 90 TAB | Refills: 1 | Status: SHIPPED | OUTPATIENT
Start: 2019-03-25 | End: 2019-09-17 | Stop reason: SDUPTHER

## 2019-05-16 ENCOUNTER — OFFICE VISIT (OUTPATIENT)
Dept: CARDIOLOGY CLINIC | Age: 74
End: 2019-05-16

## 2019-05-16 VITALS
HEART RATE: 77 BPM | SYSTOLIC BLOOD PRESSURE: 102 MMHG | BODY MASS INDEX: 23.7 KG/M2 | WEIGHT: 178.8 LBS | RESPIRATION RATE: 16 BRPM | OXYGEN SATURATION: 96 % | DIASTOLIC BLOOD PRESSURE: 47 MMHG | HEIGHT: 73 IN

## 2019-05-16 DIAGNOSIS — I10 ESSENTIAL HYPERTENSION: ICD-10-CM

## 2019-05-16 DIAGNOSIS — Z95.5 S/P BARE METAL CORONARY ARTERY STENT: ICD-10-CM

## 2019-05-16 DIAGNOSIS — R91.8 MASS OF LEFT LUNG: ICD-10-CM

## 2019-05-16 DIAGNOSIS — I25.10 CORONARY ARTERY DISEASE INVOLVING NATIVE CORONARY ARTERY OF NATIVE HEART WITHOUT ANGINA PECTORIS: Primary | ICD-10-CM

## 2019-05-16 DIAGNOSIS — I10 HYPERTENSION, ESSENTIAL: ICD-10-CM

## 2019-05-16 DIAGNOSIS — D50.0 IRON DEFICIENCY ANEMIA DUE TO CHRONIC BLOOD LOSS: ICD-10-CM

## 2019-05-16 DIAGNOSIS — Z95.5 S/P DRUG ELUTING CORONARY STENT PLACEMENT: ICD-10-CM

## 2019-05-16 DIAGNOSIS — Z82.49 FAMILY HISTORY OF CORONARY ARTERIOSCLEROSIS: ICD-10-CM

## 2019-05-16 DIAGNOSIS — I25.10 CAD IN NATIVE ARTERY: ICD-10-CM

## 2019-05-16 RX ORDER — LANOLIN ALCOHOL/MO/W.PET/CERES
CREAM (GRAM) TOPICAL
COMMUNITY

## 2019-05-16 NOTE — PROGRESS NOTES
Mathewad Husejnovic     1945       Elise Pizarro MD, [facility]  Date of Visit-5/16/2019   PCP is Archie Strickland MD   [facility] [place]  [facility]  HPI:  Jenny Cuevas is a 68 y.o. male   Follow up of CAD and BMS 2018 right posterior lateral.  Stents to the RPL 2/18 and RCA 11/ 2017. Pt is present with his wife. Overall the pt states he is doing well, but he feels that he does experience some left side chest pain and right side neck pain when walking uphill in the morning, but they cease after he's on flat ground. Pt believes that this has happened a couple times and his wife adds that this only happens when it's cold. Pt tries to walk regularly and he feels fine without much physical limitations otherwise. Pt feels fatigued and somewhat out of balance when his glucose is low, and pt's been diabetic since 2004. Pt tries to measure his BP immediately when he wakes up, and he states that he would get BP of 140's around 7 AM, and then he states that his BP goes down to 120's as time goes on throughout the day after taking medications. Pt did have horrible pneumonia and some flu-like sx's in February, and his BP was 78/45 per wife. Pt did go to [facility]  and was told that he had a huge mass on his left lung and was given IV tx. Pt also saw Dr. Curt Sinha who treated him for his anemia previously but pt is still unsure about his current lung condition. Pt also had CT done via Dr. Curt Sinha. Pt's wife then proceeded and asked for recommendations for pulmonologist.  Pt has hx of angioma on his liver but wife states that this was cleared up and she asks if the lung mass may be an angioma. Chest X-ray showed mass-like on left lung lower lobe dated 2/25/19. CT 3/18/19 showed density on left lower lobe with possible BAL     Lipids 2/18/19 , LDL 70, HDL 41.       Denies edema, syncope or shortness of breath at rest, has no tachycardia, palpitations or sense of arrhythmia. Assessment/Plan:     1. CAD. Currently ambulatory without chest pain. Key CAD CHF Meds             lisinopril (PRINIVIL, ZESTRIL) 20 mg tablet (Taking) TAKE 1 TABLET BY MOUTH EVERY DAY    rosuvastatin (CRESTOR) 10 mg tablet (Taking) Take 10 mg by mouth nightly. aspirin 81 mg chewable tablet (Taking) Take 1 Tab by mouth daily. nitroglycerin (NITROSTAT) 0.4 mg SL tablet (Taking/Discontinued) 1 Tab by SubLINGual route every five (5) minutes as needed for Chest Pain. Omega-3 Fatty Acids 300 mg cap (Taking) Take 1 Tab by mouth daily. clopidogrel (PLAVIX) 75 mg tab TAKE 1 TABLET BY MOUTH EVERY DAY          2. HTN. Suspect his home BP is at goal to low, I would not change meds at this time. BP Readings from Last 6 Encounters:   05/16/19 102/47   11/15/18 110/52   05/31/18 100/60   03/01/18 120/60   02/02/18 115/53   01/25/18 110/60       3. Lipids on high potency statin as appropriate for secondary prevention. Lipids 2/18/19 , LDL 70, HDL 41.  4. Lung mass/shadow [facility] at [facility] will have him see Dr. Courtney Lala or Dr. Gene Cochran. 5. F/u in 6 months. Future Appointments   Date Time Provider Shavon Roei   12/5/2019  1:00 PM Maryann Galindo MD [facility]      Patient Instructions   [facility] Please call [phone] and schedule an appointment with Dr. Bouchra Aguirre or Dr. Lawyer Mann. You will be scheduled for a 6 month follow up. Impression:   1. Coronary artery disease involving native coronary artery of native heart without angina pectoris    2. Hypertension, essential    3. Mass of left lung    4. S/P drug eluting coronary stent placement    5. S/p bare metal coronary artery stent    6. Iron deficiency anemia due to chronic blood loss    7. Family history of coronary arteriosclerosis    8. Essential hypertension    9. CAD in native artery       Cardiac History:   11/4/17: TTE: EF 55-60%  NWMA.  Mild LVH     ROS-except as noted above. . A complete cardiac and respiratory are reviewed and negative except as above ; Resp-denies wheezing  or productive cough,. Const- No unusual weight loss or fever; Neuro-no recent seizure or CVA ; GI- No BRBPR, abdom pain, bloating ; - no  hematuria   see supplement sheet, initialed and to be scanned by staff  Past Medical History:   Diagnosis Date    Anemia     Dr Jacklyn Castleman found ooze at site of Bilroth anastomosis, was on sucralfate, saw Dr Shannon Bautista CAD in native artery 11/03/2017    admit [place], RCA 99% mid, PLB prox 90% collaterals from [address] Diabetes ([facility] [address] 11/03/2017    on metformin    Family history of coronary arteriosclerosis     Hemangioma     splenectomy-Mccormick    Hypertension, essential 12/6/2017    NSTEMI (non-ST elevated myocardial infarction) ([facility] [address] 11/03/2017    troponin 0.67    PUD (peptic ulcer disease) 1977    Bilroth 2    S/p bare metal coronary artery stent 12/6/2017    S/P drug eluting coronary stent placement 3/25/2018    Statin intolerance 11/30/2017    declined Lipitor post BMS      Social Hx= reports that he has never smoked. He has never used smokeless tobacco. He reports that he does not drink alcohol or use drugs. Exam and Labs:  /47 (BP 1 Location: Right arm, BP Patient Position: Sitting)   Pulse 77   Resp 16   Ht 6' 1\" (1.854 m)   Wt 178 lb 12.8 oz (81.1 kg)   SpO2 96%   BMI 23.59 kg/m² Constitutional:  NAD, comfortable  Head: NC,AT. Eyes: No scleral icterus. Neck:  Neck supple. No JVD present. Throat: moist mucous membranes. Chest: Effort normal & normal respiratory excursion . Neurological: alert, conversant and oriented . Skin: Skin is not cold. No obvious systemic rash noted. Not diaphoretic. No erythema. Psychiatric:  Grossly normal mood and affect. Behavior appears normal. Extremities:  no clubbing or cyanosis. Abdomen: non distended    Lungs:breath sounds normal. No stridor.  distress, wheezes or Rales.  Heart: normal rate, regular rhythm, normal S1, S2, no murmurs, rubs, clicks or gallops , PMI non displaced. Edema: Edema is none. Lab Results   Component Value Date/Time    Cholesterol, total 212 (H) 11/04/2017 12:55 AM    HDL Cholesterol 62 11/04/2017 12:55 AM    LDL, calculated 131.4 (H) 11/04/2017 12:55 AM    Triglyceride 93 11/04/2017 12:55 AM    CHOL/HDL Ratio 3.4 11/04/2017 12:55 AM     Lab Results   Component Value Date/Time    Sodium 143 02/02/2018 09:39 AM    Potassium 3.8 02/02/2018 09:39 AM    Chloride 105 02/02/2018 09:39 AM    CO2 31 02/02/2018 09:39 AM    Anion gap 7 02/02/2018 09:39 AM    Glucose 169 (H) 02/02/2018 09:39 AM    BUN 14 02/02/2018 09:39 AM    Creatinine 0.67 (L) 02/02/2018 09:39 AM    BUN/Creatinine ratio 21 (H) 02/02/2018 09:39 AM    GFR est AA >60 02/02/2018 09:39 AM    GFR est non-AA >60 02/02/2018 09:39 AM    Calcium 8.5 02/02/2018 09:39 AM      Wt Readings from Last 3 Encounters:   05/16/19 178 lb 12.8 oz (81.1 kg)   11/15/18 180 lb 3.2 oz (81.7 kg)   05/31/18 184 lb 12.8 oz (83.8 kg)      BP Readings from Last 3 Encounters:   05/16/19 102/47   11/15/18 110/52   05/31/18 100/60      Current Outpatient Medications   Medication Sig    ferrous sulfate 325 mg (65 mg iron) tablet Take  by mouth two (2) times a day.  lisinopril (PRINIVIL, ZESTRIL) 20 mg tablet TAKE 1 TABLET BY MOUTH EVERY DAY    cholecalciferol (VITAMIN D3) 1,000 unit cap Take 1,000 Units by mouth daily.  rosuvastatin (CRESTOR) 10 mg tablet Take 10 mg by mouth nightly.  aspirin 81 mg chewable tablet Take 1 Tab by mouth daily.  nitroglycerin (NITROSTAT) 0.4 mg SL tablet 1 Tab by SubLINGual route every five (5) minutes as needed for Chest Pain.  Omega-3 Fatty Acids 300 mg cap Take 1 Tab by mouth daily.  metFORMIN (GLUCOPHAGE) 500 mg tablet Take 500 mg by mouth four (4) times daily (with meals).  GLIPIZIDE PO Take 5 mg by mouth three (3) times daily.     sucralfate (CARAFATE) 100 mg/mL suspension Take 1 g by mouth four (4) times daily.  clopidogrel (PLAVIX) 75 mg tab TAKE 1 TABLET BY MOUTH EVERY DAY     No current facility-administered medications for this visit. Impression see above.       Written by Dashawn Rodríguez, as dictated by Manolo Jade MD.

## 2019-05-16 NOTE — PATIENT INSTRUCTIONS
You have been referred to [facility]. Please call [phone] and schedule an appointment with Dr. Lauryn Thakur or Dr. Imelda Christie. You will be scheduled for a 6 month follow up.

## 2019-05-16 NOTE — Clinical Note
5/28/19 Patient: Jimmy Mcclendon YOB: 1945 Date of Visit: 5/16/2019 Villa Siemens, MD 
[address] [address] VIA Facsimile: [phone] Dear Villa Siemens, MD, Thank you for referring Mr. Jimmy Mcclendon to [address] for evaluation. My notes for this consultation are attached. If you have questions, please do not hesitate to call me. I look forward to following your patient along with you.  
 
 
Sincerely, 
 
Harlan Barrow MD

## 2019-05-16 NOTE — PROGRESS NOTES
Chief Complaint   Patient presents with    Follow-up     Occassional chest pressure/neck pain with exertion. Denies chest pain/shortness of breath/swelling. Diagnosed with pneumonia in 2/2019.   CT today by hematologist.

## 2019-05-24 DIAGNOSIS — I25.10 CORONARY ARTERY DISEASE INVOLVING NATIVE CORONARY ARTERY OF NATIVE HEART WITHOUT ANGINA PECTORIS: Primary | ICD-10-CM

## 2019-05-24 RX ORDER — NITROGLYCERIN 0.4 MG/1
0.4 TABLET SUBLINGUAL
Qty: 1 BOTTLE | Refills: 3 | Status: SHIPPED | OUTPATIENT
Start: 2019-05-24

## 2019-05-24 NOTE — TELEPHONE ENCOUNTER
Request for nitro. Last office visit 5-16-19, next office visit 12-5-19.  Refills per verbal order from Dr. Rea Bello.

## 2019-09-17 RX ORDER — LISINOPRIL 20 MG/1
TABLET ORAL
Qty: 30 TAB | Refills: 5 | Status: SHIPPED | OUTPATIENT
Start: 2019-09-17 | End: 2020-03-11

## 2020-01-09 ENCOUNTER — OFFICE VISIT (OUTPATIENT)
Dept: CARDIOLOGY CLINIC | Age: 75
End: 2020-01-09

## 2020-01-09 VITALS
HEIGHT: 73 IN | BODY MASS INDEX: 23.99 KG/M2 | OXYGEN SATURATION: 99 % | SYSTOLIC BLOOD PRESSURE: 122 MMHG | WEIGHT: 181 LBS | RESPIRATION RATE: 16 BRPM | DIASTOLIC BLOOD PRESSURE: 65 MMHG | HEART RATE: 66 BPM

## 2020-01-09 DIAGNOSIS — Z95.5 S/P BARE METAL CORONARY ARTERY STENT: ICD-10-CM

## 2020-01-09 DIAGNOSIS — Z95.5 S/P DRUG ELUTING CORONARY STENT PLACEMENT: ICD-10-CM

## 2020-01-09 DIAGNOSIS — I10 HYPERTENSION, ESSENTIAL: ICD-10-CM

## 2020-01-09 DIAGNOSIS — I25.110 CORONARY ARTERY DISEASE INVOLVING NATIVE CORONARY ARTERY OF NATIVE HEART WITH UNSTABLE ANGINA PECTORIS (HCC): Primary | ICD-10-CM

## 2020-01-09 DIAGNOSIS — D50.0 IRON DEFICIENCY ANEMIA DUE TO CHRONIC BLOOD LOSS: ICD-10-CM

## 2020-01-09 NOTE — PATIENT INSTRUCTIONS
You will be scheduled for nuclear stress testing after your appointment today, we will call with results and see you back in 6 months. Wear comfortable clothing (shorts or pants with a shirt or blouse- no underwire bras) and walking or athletic shoes. Do not eat or drink anything, except water, for at least 2 hours prior to your appointment. Avoid tobacco products for at least 6 hours prior to your test.    Do not eat or drink anything containing caffeine, including but not limited to the following: chocolate, regular and decaffeinated coffee, soft drinks, or tea for at least 12-24 hours prior to your test.    Do not hold your scheduled medications prior to your test.     Your test will be performed on a 1 day protocol. This is determined by your height, weight, and other risk factors. For a 2 day test, please allow for 2 hours in the office each day. For a 1 day test, please allow for 4 hours in the office that day. The radioactive isotope used for your testing is different from any of the dyes that are commonly used in x-ray procedures, and is ordered specially for your test. Please call to cancel or reschedule your appointment at least 24 hours prior to your scheduled appointment to avoid being billed for the expensive isotope.

## 2020-01-09 NOTE — PROGRESS NOTES
Mirsad Husejnovic     1945       Elise Bhakta MD, [facility]  Date of Visit-1/9/2020   PCP is Balbir Lopez MD   [facility]  [facility]  HPI:  Claudia Orozco is a 69 y.o. male   Follow up of CAD and BMS 2018 right posterior lateral.  Stents to the RPL 2/18 and RCA 11/ 2017. Pt is present with his wife. Pt notes that when he walks up a hill he starts to feel some chest pressure and chest pain. Pt states that chest pressure starts in the center of his chest. He has taken nitroglycerin for it a few times and it has relieved the pain. Pt also feels chest pain on the left side of his chest when he is laying on his left side at night. Pt also reports that he felt the pressure while he was getting the EKG. Pt notes that the chest pressure is causing him some anxiety and he has not felt as happy. Pt also reports that he has not felt as energized lately. Pt wonders if this fatigue could be caused by his medications or his anemia. Denies edema, syncope or shortness of breath at rest, has no tachycardia, palpitations or sense of arrhythmia. EKG: SR WNL  Assessment/Plan:     1. Coronary artery disease involving native coronary artery of native heart with unstable angina pectoris ([address]  likely recruitment [place]   He reports a pain that he finds very bothersome. I think they are atypical features. He did get better substantially after the second angioplasty. EKG is normal. I do not think he would tolerate or do well with Imdur or BB. I will get a stress nuclear. Continue ASA and statin. - AMB POC EKG ROUTINE W/ 12 LEADS, INTER & REP    2. Hypertension, essential  Continue Lisinopril. Well controlled. BP Readings from Last 6 Encounters:   01/09/20 122/65   05/16/19 102/47   11/15/18 110/52   05/31/18 100/60   03/01/18 120/60   02/02/18 115/53       3. S/P drug eluting coronary stent placement  Now 1 year post stent.  Has stopped Plavix due to GI hemorrhage issues. 4. S/p bare metal coronary artery stent  Hx of anxiety    5. Iron deficiency anemia due to chronic blood loss  He is concerned about his HGB but it seems to have been in the 13 range. There is a lung mass and a CT scan from early this year. Followed by his hematologist. He saw Dr. Stefanie Omalley 6/14/19. Hx of PUD s/p Billroth 2/19/78. Dr. Stefanie Omalley felt that there was improvement in left lower lobe infiltrate with no bronchiectasis      Future Appointments   Date Time Provider Department Center   1/17/2020  8:00 AM JULIUS SHELTON [address]   7/20/2020  8:20 AM Elise Pizarro  [address]      Patient Instructions   You will be scheduled for nuclear stress testing after your appointment today, we will call with results and see you back in 6 months. Wear comfortable clothing (shorts or pants with a shirt or blouse- no underwire bras) and walking or athletic shoes. Do not eat or drink anything, except water, for at least 2 hours prior to your appointment. Avoid tobacco products for at least 6 hours prior to your test.    Do not eat or drink anything containing caffeine, including but not limited to the following: chocolate, regular and decaffeinated coffee, soft drinks, or tea for at least 12-24 hours prior to your test.    Do not hold your scheduled medications prior to your test.     Your test will be performed on a 1 day protocol. This is determined by your height, weight, and other risk factors. For a 2 day test, please allow for 2 hours in the office each day. For a 1 day test, please allow for 4 hours in the office that day. The radioactive isotope used for your testing is different from any of the dyes that are commonly used in x-ray procedures, and is ordered specially for your test. Please call to cancel or reschedule your appointment at least 24 hours prior to your scheduled appointment to avoid being billed for the expensive isotope.      Key CAD CHF Meds lisinopril (PRINIVIL, ZESTRIL) 20 mg tablet (Taking) TAKE 1 TABLET BY MOUTH EVERY DAY    nitroglycerin (NITROSTAT) 0.4 mg SL tablet (Taking) 1 Tab by SubLINGual route every five (5) minutes as needed for Chest Pain. Do not exceed 3 doses in 24 hours. rosuvastatin (CRESTOR) 10 mg tablet (Taking) Take 10 mg by mouth nightly. aspirin 81 mg chewable tablet (Taking) Take 1 Tab by mouth daily. Omega-3 Fatty Acids 300 mg cap (Taking) Take 1 Tab by mouth daily. F/u in 6 months     Impression:   1. Coronary artery disease involving native coronary artery of native heart with unstable angina pectoris ([facility]    2. Hypertension, essential    3. S/P drug eluting coronary stent placement    4. S/p bare metal coronary artery stent    5. Iron deficiency anemia due to chronic blood loss       Cardiac History:   11/4/17: TTE: EF 55-60%  NWMA. Mild LVH     ROS-except as noted above. . A complete cardiac and respiratory are reviewed and negative except as above ; Resp-denies wheezing  or productive cough,.  Const- No unusual weight loss or fever; Neuro-no recent seizure or CVA ; GI- No BRBPR, abdom pain, bloating ; - no  hematuria   see supplement sheet, initialed and to be scanned by staff  Past Medical History:   Diagnosis Date    Anemia     Dr Reynaldo Gamino found ooze at site of Bilroth anastomosis, was on sucralfate, saw Dr Fransisco Felton CAD in native artery 11/03/2017    admit [place], RCA 99% mid, PLB prox 90% collaterals from [address] Diabetes ([facility] [address] [address] 11/03/2017    on metformin    Family history of coronary arteriosclerosis     Hemangioma     splenectomy-Mccormick    Hypertension, essential 12/6/2017    NSTEMI (non-ST elevated myocardial infarction) ([facility] [address] [facility] 11/03/2017    troponin 0.67    PUD (peptic ulcer disease) 1977    Bilroth 2    S/p bare metal coronary artery stent 12/6/2017    S/P drug eluting coronary stent placement 3/25/2018    Statin intolerance 11/30/2017    declined Lipitor post BMS Social Hx= reports that he has never smoked. He has never used smokeless tobacco. He reports that he does not drink alcohol or use drugs. Exam and Labs:  /65 (BP 1 Location: Left arm, BP Patient Position: Sitting)   Pulse 66   Resp 16   Ht 6' 1\" (1.854 m)   Wt 181 lb (82.1 kg)   SpO2 99%   BMI 23.88 kg/m² Constitutional:  NAD, comfortable  Head: NC,AT. Eyes: No scleral icterus. Neck:  Neck supple. No JVD present. Throat: moist mucous membranes. Chest: Effort normal & normal respiratory excursion . Neurological: alert, conversant and oriented . Skin: Skin is not cold. No obvious systemic rash noted. Not diaphoretic. No erythema. Psychiatric:  Grossly normal mood and affect. Behavior appears normal. Extremities:  no clubbing or cyanosis. Abdomen: non distended    Lungs:breath sounds normal. No stridor. distress, wheezes or  Rales. Heart: normal rate, regular rhythm, normal S1, S2, no murmurs, rubs, clicks or gallops , PMI non displaced. Edema: Edema is none.   Lab Results   Component Value Date/Time    Cholesterol, total 212 (H) 11/04/2017 12:55 AM    HDL Cholesterol 62 11/04/2017 12:55 AM    LDL, calculated 131.4 (H) 11/04/2017 12:55 AM    Triglyceride 93 11/04/2017 12:55 AM    CHOL/HDL Ratio 3.4 11/04/2017 12:55 AM     Lab Results   Component Value Date/Time    Sodium 143 02/02/2018 09:39 AM    Potassium 3.8 02/02/2018 09:39 AM    Chloride 105 02/02/2018 09:39 AM    CO2 31 02/02/2018 09:39 AM    Anion gap 7 02/02/2018 09:39 AM    Glucose 169 (H) 02/02/2018 09:39 AM    BUN 14 02/02/2018 09:39 AM    Creatinine 0.67 (L) 02/02/2018 09:39 AM    BUN/Creatinine ratio 21 (H) 02/02/2018 09:39 AM    GFR est AA >60 02/02/2018 09:39 AM    GFR est non-AA >60 02/02/2018 09:39 AM    Calcium 8.5 02/02/2018 09:39 AM      Wt Readings from Last 3 Encounters:   01/09/20 181 lb (82.1 kg)   05/16/19 178 lb 12.8 oz (81.1 kg)   11/15/18 180 lb 3.2 oz (81.7 kg)      BP Readings from Last 3 Encounters:   01/09/20 122/65 05/16/19 102/47   11/15/18 110/52      Current Outpatient Medications   Medication Sig    lisinopril (PRINIVIL, ZESTRIL) 20 mg tablet TAKE 1 TABLET BY MOUTH EVERY DAY    nitroglycerin (NITROSTAT) 0.4 mg SL tablet 1 Tab by SubLINGual route every five (5) minutes as needed for Chest Pain. Do not exceed 3 doses in 24 hours.  ferrous sulfate 325 mg (65 mg iron) tablet Take  by mouth two (2) times a day.  cholecalciferol (VITAMIN D3) 1,000 unit cap Take 1,000 Units by mouth daily.  rosuvastatin (CRESTOR) 10 mg tablet Take 10 mg by mouth nightly.  aspirin 81 mg chewable tablet Take 1 Tab by mouth daily.  Omega-3 Fatty Acids 300 mg cap Take 1 Tab by mouth daily.  metFORMIN (GLUCOPHAGE) 500 mg tablet Take 500 mg by mouth four (4) times daily (with meals).  GLIPIZIDE PO Take 5 mg by mouth three (3) times daily.  sucralfate (CARAFATE) 100 mg/mL suspension Take 1 g by mouth four (4) times daily. No current facility-administered medications for this visit. Impression see above.       Written by Suzy Dong, as dictated by Blanca Frausto MD.

## 2020-01-09 NOTE — Clinical Note
1/9/20 Patient: Anabell George YOB: 1945 Date of Visit: 1/9/2020 Tonya Rubi MD 
[address] [id number] VIA Facsimile: [phone] Dear Tonya Rubi MD, Thank you for referring Mr. Anabell George to [address] [place] [address] for evaluation. My notes for this consultation are attached. If you have questions, please do not hesitate to call me. I look forward to following your patient along with you.  
 
 
Sincerely, 
 
Jose Jaramillo MD

## 2020-01-11 NOTE — PROGRESS NOTES
[facility]  -Progress Note     Deepthi Crowe [phone]   11/5/2017      Saumya Kebede M.D. , F.A.C.C.   --------PCP:-Doe Acuña MD   -----Subjective:   . Colt Kate is a 80 y.o. male   Comfortable   Resting comfortably  No complaints  Denies chest pain, heart palpitations , increasing edema, pre-syncope or shortness of breath at rest   No problems overnight, rhythm and hemodynamics stable -see vitals below     Patient Vitals for the past 12 hrs:   Temp Pulse Resp BP SpO2   11/05/17 0351 97.8 °F (36.6 °C) 67 18 137/74 97 %   11/04/17 2342 - - - 153/79 -   11/04/17 2320 97.7 °F (36.5 °C) 66 - 174/68 97 %   11/04/17 1945 - - - - 96 %   11/04/17 1904 98 °F (36.7 °C) 60 20 143/70 96 %        Discussion/Plans/Recs   [place],NSTEMI with recent increasing crescendo chest pain CCS 3  Troponin second is flat  Agreeable to plan for cath tomorrow. Bp stable  ASA, statin, cath on Monday at noon  LDL today is 131 -have already started statin     Cardiac Studies/Hx:  No specialty comments available. Past Medical History:   Diagnosis Date    Anemia     Dr Eun Jurado found ooze at site of Bilroth anastomosis, was on sucralfate, saw Dr Steward Prader Diabetes ([facility] [address] 11/03/2017    on metformin    Family history of coronary arteriosclerosis     Hemangioma     splenectomy-Mccormick    PUD (peptic ulcer disease) 1977    Bilroth 2      ROS-pertinents  negative except as above  The pertinent portions of the medical history,physician and nursing notes, meds,vitals , labs and Ins/Outs,are reviewed in the electronic record.     Results for orders placed or performed during the hospital encounter of 11/03/17   EKG, 12 LEAD, INITIAL   Result Value Ref Range    Ventricular Rate 64 BPM    Atrial Rate 64 BPM    P-R Interval 206 ms    QRS Duration 90 ms    Q-T Interval 418 ms    QTC Calculation (Bezet) 431 ms    Calculated P Axis 75 degrees    Calculated R Axis 19 degrees    Calculated T Axis 52 degrees    Diagnosis       Normal sinus rhythm  No previous ECGs available  Confirmed by Marlin Marie MD. ([id number]) on 11/4/2017 4:22:13 AM        Vitals:    11/04/17 1945 11/04/17 2320 11/04/17 2342 11/05/17 0351   BP:  174/68 153/79 137/74   BP 1 Location:  Right arm Right arm Right arm   BP Patient Position:  Sitting  Comment: recently walked  down the vasquez At rest At rest   Pulse:  66  67   Resp:    18   Temp:  97.7 °F (36.5 °C)  97.8 °F (36.6 °C)   SpO2: 96% 97%  97%   Weight:    184 lb 4.9 oz (83.6 kg)   Height:           Objective:    Physical Exam:   Patient Vitals for the past 12 hrs:   Temp Pulse Resp BP SpO2   11/05/17 0351 97.8 °F (36.6 °C) 67 18 137/74 97 %   11/04/17 2342 - - - 153/79 -   11/04/17 2320 97.7 °F (36.5 °C) 66 - 174/68 97 %   11/04/17 1945 - - - - 96 %   11/04/17 1904 98 °F (36.7 °C) 60 20 143/70 96 %      General:  alert, cooperative, no distress, appears stated age   ENT, Neck:  no jvd   Chest Wall: inspection normal - no chest wall deformities or tenderness, respiratory effort normal   Lung: clear to auscultation bilaterally   Heart:  normal rate, regular rhythm, normal S1, S2, no murmurs, rubs, clicks or gallops   Abdomen: nondistended   Extremities: extremities normal, atraumatic, no cyanosis or edema     Last 24hr Input/Output:    Intake/Output Summary (Last 24 hours) at 11/05/17 0385  Last data filed at 11/04/17 0800   Gross per 24 hour   Intake                0 ml   Output              500 ml   Net             -500 ml        Data Review:   Recent Results (from the past 24 hour(s))   GLUCOSE, POC    Collection Time: 11/04/17 11:23 AM   Result Value Ref Range    Glucose (POC) 105 (H) 65 - 100 mg/dL    Performed by [id number], POC    Collection Time: 11/04/17  4:29 PM   Result Value Ref Range    Glucose (POC) 101 (H) 65 - 100 mg/dL    Performed by Nohemi John, POC    Collection Time: 11/04/17  9:10 PM   Result Value Ref Range    Glucose (POC) 87 65 - 100 mg/dL    Performed by Steven Rea, POC    Collection Time: 11/04/17 11:39 PM   Result Value Ref Range    Glucose (POC) 101 (H) 65 - 100 mg/dL    Performed by Steven Rea, POC    Collection Time: 11/05/17  6:04 AM   Result Value Ref Range    Glucose (POC) 135 (H) 65 - 100 mg/dL    Performed by Saroj Claros MD 11/5/2017 normal (ped)...

## 2020-03-11 RX ORDER — LISINOPRIL 20 MG/1
TABLET ORAL
Qty: 30 TAB | Refills: 5 | Status: SHIPPED | OUTPATIENT
Start: 2020-03-11 | End: 2020-06-08

## 2020-06-08 RX ORDER — LISINOPRIL 20 MG/1
TABLET ORAL
Qty: 90 TAB | Refills: 1 | Status: SHIPPED | OUTPATIENT
Start: 2020-06-08 | End: 2020-11-19

## 2020-07-20 ENCOUNTER — OFFICE VISIT (OUTPATIENT)
Dept: CARDIOLOGY CLINIC | Age: 75
End: 2020-07-20

## 2020-07-20 ENCOUNTER — HOSPITAL ENCOUNTER (OUTPATIENT)
Dept: PREADMISSION TESTING | Age: 75
Discharge: HOME | End: 2020-07-20
Payer: MEDICARE

## 2020-07-20 VITALS
DIASTOLIC BLOOD PRESSURE: 70 MMHG | SYSTOLIC BLOOD PRESSURE: 120 MMHG | HEART RATE: 58 BPM | RESPIRATION RATE: 16 BRPM | BODY MASS INDEX: 24.01 KG/M2 | WEIGHT: 181.2 LBS | HEIGHT: 73 IN | OXYGEN SATURATION: 97 %

## 2020-07-20 DIAGNOSIS — E78.00 HYPERCHOLESTEREMIA: ICD-10-CM

## 2020-07-20 DIAGNOSIS — D50.0 IRON DEFICIENCY ANEMIA DUE TO CHRONIC BLOOD LOSS: ICD-10-CM

## 2020-07-20 DIAGNOSIS — Z78.9 STATIN INTOLERANCE: ICD-10-CM

## 2020-07-20 DIAGNOSIS — Z95.5 S/P BARE METAL CORONARY ARTERY STENT: ICD-10-CM

## 2020-07-20 DIAGNOSIS — U07.1 COVID-19: ICD-10-CM

## 2020-07-20 DIAGNOSIS — I25.110 CORONARY ARTERY DISEASE INVOLVING NATIVE CORONARY ARTERY OF NATIVE HEART WITH UNSTABLE ANGINA PECTORIS (HCC): Primary | ICD-10-CM

## 2020-07-20 DIAGNOSIS — I25.110 CORONARY ARTERY DISEASE INVOLVING NATIVE CORONARY ARTERY OF NATIVE HEART WITH UNSTABLE ANGINA PECTORIS (HCC): ICD-10-CM

## 2020-07-20 DIAGNOSIS — Z95.5 S/P DRUG ELUTING CORONARY STENT PLACEMENT: ICD-10-CM

## 2020-07-20 DIAGNOSIS — I10 HYPERTENSION, ESSENTIAL: ICD-10-CM

## 2020-07-20 PROCEDURE — 87635 SARS-COV-2 COVID-19 AMP PRB: CPT

## 2020-07-20 NOTE — PROGRESS NOTES
Visit Vitals  /70 (BP 1 Location: Left arm, BP Patient Position: Sitting)   Pulse (!) 58   Resp 16   Ht 6' 1\" (1.854 m)   Wt 181 lb 3.2 oz (82.2 kg)   SpO2 97%   BMI 23.91 kg/m²

## 2020-07-20 NOTE — PROGRESS NOTES
Mirsad Husejnovic     1945       Elise Pizarro MD, [facility] [place]  Date of Visit-7/20/2020   PCP is Zahida Lobo MD   [facility] [place]  [facility]  HPI:  Scotty Valero is a 69 y.o. male   6 month follow up of CAD and BMS 2018 right posterior lateral. Stents to the RPL 2/18 and RCA 11/ 2017.    01/17/20   NUCLEAR CARDIAC STRESS TEST 01/22/2020 1/24/2020    Narrative · Gated SPECT: Left ventricular function post-stress was normal.   Calculated ejection fraction is 66%. There is no evidence of transient   ischemic dilation (TID). The TID ratio is 1.07.  · Baseline ECG: Normal EKG, normal sinus rhythm. · Left ventricular perfusion is probably normal.  · Negative myocardial perfusion imaging. Myocardial perfusion imaging   supports a low risk stress test.  · Myocardial perfusion imaging defect 1: There is a defect that is small   in size with a mild reduction in uptake present in the mid inferior   location(s) that is partially reversible. There is normal wall motion in   the defect area. Viability in the area is good. caused by subdiaphragmatic   activity. The possibility of artifact cannot be excluded. Perfusion defect   was visually and quantitatively present. Signed by: Griselda Mu, MD       Pt is accompanied by his wife. Pt states that he has been trying to be active, but that he has had a pressure or pain in his chest that radiates up to the right side of his neck when he walks. This discomfort occurs everyday and most days he will take nitroglycerin to help relieve the pain. He states that he does a minimum of 10,000 steps a day. His wife notes that he has been moving slower. Pt has been monitoring his BP at home and reports that his SBP has been staying between 120 and 140, though he does note a day where his SBP was 110 after his walk. Pt has not had any blood work recently due to COVID-19.      Denies edema, syncope or shortness of breath at rest, has no tachycardia, palpitations or sense of arrhythmia. EKG: SB, first degree AV block    Assessment/Plan:     1. Coronary artery disease involving native coronary artery of native heart with unstable angina pectoris ([address]  Increasing angina with exertion. Using nitro almost every day of the week. Has not tolerated Imdur or BB. Nuclear in January showed partial reversibility in the inferior wall. His previous stent was in the RCA and he has disease in the PLB. We have discussed options and will proceed with a cath with likely angioplasty. risks and benefits discussed  2/1000 serious life threatening risk includes stroke, heart attack leading to  death  1/100 prolong hospital stay above and bleeding, groin complications, infection, renals possible but  unlikely unless baseline renal dysfunction, tear in cardiac vessel, tamponade  PCI 4-7/427 similar complications but benefits likely outweigh risk   - AMB POC EKG ROUTINE W/ 12 LEADS, INTER & REP    2. Hypertension, essential  He notes some episodes of feeling low BP after walking. Will consider change to 10 mg if persistent after cath. BP Readings from Last 6 Encounters:   07/20/20 120/70   01/09/20 122/65   05/16/19 102/47   11/15/18 110/52   05/31/18 100/60   03/01/18 120/60       3. Hypercholesterolemia/statin intolerance  Repeat labs today for fasting lipids. Previously did not take Lipitor but seems to be tolerating Crestor. Lab Results   Component Value Date/Time    LDL, calculated 131.4 (H) 11/04/2017 12:55 AM      4. S/P drug eluting coronary stent placement and bare metal coronary artery stent  -Hx of anxiety  -Had previously stopped Plavix due to GI hemorrhage. 5. Iron deficiency anemia due to chronic blood loss  Recheck labs today in anticipation of cath. There is a lung mass and a CT scan from early this year. Followed by his hematologist. He saw Dr. Dayana Vang 6/14/19. Hx of PUD s/p Michael 2/19/78.  Dr. Dayana Vang felt that there was improvement in left lower lobe infiltrate with no bronchiectasis      Lab Results   Component Value Date/Time    HGB 11.8 (L) 02/02/2018 09:39 AM           Impression:   1. Coronary artery disease involving native coronary artery of native heart with unstable angina pectoris ([facility]    2. Hypertension, essential    3. Hypercholesteremia    4. S/P drug eluting coronary stent placement    5. S/p bare metal coronary artery stent    6. Iron deficiency anemia due to chronic blood loss    7. Statin intolerance       Cardiac History:   11/4/17: TTE: EF 55-60%  NWMA. Mild LVH     ROS-except as noted above. . A complete cardiac and respiratory are reviewed and negative except as above ; Resp-denies wheezing  or productive cough,. Const- No unusual weight loss or fever; Neuro-no recent seizure or CVA ; GI- No BRBPR, abdom pain, bloating ; - no  hematuria   see supplement sheet, initialed and to be scanned by staff  Past Medical History:   Diagnosis Date    Anemia     Dr Adelaida Rodriguez found ooze at site of Bilroth anastomosis, was on sucralfate, saw Dr Gurmeet Gomez CAD in native artery 11/03/2017    admit [place], RCA 99% mid, PLB prox 90% collaterals from [address] Diabetes ([facility] 11/03/2017    on metformin    Family history of coronary arteriosclerosis     Hemangioma     splenectomy-Mccormick    Hypertension, essential 12/6/2017    NSTEMI (non-ST elevated myocardial infarction) ([facility] 11/03/2017    troponin 0.67    PUD (peptic ulcer disease) 1977    Bilroth 2    S/p bare metal coronary artery stent 12/6/2017    S/P drug eluting coronary stent placement 3/25/2018    Statin intolerance 11/30/2017    declined Lipitor post BMS      Social Hx= reports that he has never smoked. He has never used smokeless tobacco. He reports that he does not drink alcohol or use drugs.      Exam and Labs:  /70 (BP 1 Location: Left arm, BP Patient Position: Sitting)   Pulse (!) 58   Resp 16   Ht 6' 1\" (1.854 m)   Wt 181 lb 3.2 oz (82.2 kg)   SpO2 97%   BMI 23.91 kg/m² Constitutional:  NAD, comfortable  Head: NC,AT. Eyes: No scleral icterus. Neck:  Neck supple. No JVD present. Throat: moist mucous membranes. Chest: Effort normal & normal respiratory excursion . Neurological: alert, conversant and oriented . Skin: Skin is not cold. No obvious systemic rash noted. Not diaphoretic. No erythema. Psychiatric:  Grossly normal mood and affect. Behavior appears normal. Extremities:  a few venous varicosities no clubbing or cyanosis. Abdomen: non distended    Lungs:breath sounds normal. No stridor. distress, wheezes or  Rales. Heart: normal rate, regular rhythm, normal S1, S2, no murmurs, rubs, clicks or gallops , PMI non displaced. Edema: Edema is none .   Lab Results   Component Value Date/Time    Cholesterol, total 212 (H) 11/04/2017 12:55 AM    HDL Cholesterol 62 11/04/2017 12:55 AM    LDL, calculated 131.4 (H) 11/04/2017 12:55 AM    Triglyceride 93 11/04/2017 12:55 AM    CHOL/HDL Ratio 3.4 11/04/2017 12:55 AM     Lab Results   Component Value Date/Time    Sodium 143 02/02/2018 09:39 AM    Potassium 3.8 02/02/2018 09:39 AM    Chloride 105 02/02/2018 09:39 AM    CO2 31 02/02/2018 09:39 AM    Anion gap 7 02/02/2018 09:39 AM    Glucose 169 (H) 02/02/2018 09:39 AM    BUN 14 02/02/2018 09:39 AM    Creatinine 0.67 (L) 02/02/2018 09:39 AM    BUN/Creatinine ratio 21 (H) 02/02/2018 09:39 AM    GFR est AA >60 02/02/2018 09:39 AM    GFR est non-AA >60 02/02/2018 09:39 AM    Calcium 8.5 02/02/2018 09:39 AM      Wt Readings from Last 3 Encounters:   07/20/20 181 lb 3.2 oz (82.2 kg)   01/17/20 181 lb (82.1 kg)   01/09/20 181 lb (82.1 kg)      BP Readings from Last 3 Encounters:   07/20/20 120/70   01/09/20 122/65   05/16/19 102/47      Current Outpatient Medications   Medication Sig    lisinopriL (PRINIVIL, ZESTRIL) 20 mg tablet TAKE 1 TABLET BY MOUTH EVERY DAY    nitroglycerin (NITROSTAT) 0.4 mg SL tablet 1 Tab by SubLINGual route every five (5) minutes as needed for Chest Pain. Do not exceed 3 doses in 24 hours.  ferrous sulfate 325 mg (65 mg iron) tablet Take  by mouth two (2) times a day.  cholecalciferol (VITAMIN D3) 1,000 unit cap Take 1,000 Units by mouth daily.  rosuvastatin (CRESTOR) 10 mg tablet Take 10 mg by mouth nightly.  aspirin 81 mg chewable tablet Take 1 Tab by mouth daily.  Omega-3 Fatty Acids 300 mg cap Take 1 Tab by mouth daily.  metFORMIN (GLUCOPHAGE) 500 mg tablet Take 500 mg by mouth four (4) times daily (with meals).  GLIPIZIDE PO Take 5 mg by mouth three (3) times daily.  sucralfate (CARAFATE) 100 mg/mL suspension Take 1 g by mouth four (4) times daily. No current facility-administered medications for this visit. Impression see above.       Written by Joshua Weiner, as dictated by Felicia Arcos MD.

## 2020-07-20 NOTE — Clinical Note
7/20/20 Patient: Scotty Valero YOB: 1945 Date of Visit: 7/20/2020 Bertin Quezada MD 
[address] [address] VIA Facsimile: [phone] Dear Bertin Quezada MD, Thank you for referring Mr. Scotty Valero to [address] [place] [address] for evaluation. My notes for this consultation are attached. If you have questions, please do not hesitate to call me. I look forward to following your patient along with you.  
 
 
Sincerely, 
 
Lucita Washington MD

## 2020-07-20 NOTE — PATIENT INSTRUCTIONS
Your procedure is scheduled for July 29th @ 2:30PM. You need to arrive about 2 hours prior to procedure, so please arrive by 12:30PM to [address] [place] [address] from the [address] [facility] parking courtyard entrance. Once inside, go to the left to outpatient registration. Bring your insurance information and list of current medications. You may not have anything to eat or drink after Midnight, except for sips of water to take medications. Medication instructions: Do not take your metformin the day before, the day of or the day after your procedure. *Please go to [facility] today to get your COVID 19 test.  *Have labs drawn prior to procedure (a couple days prior if possible.)  *You will need someone to drive you home after the procedure. Plan to be at [facility] a total of 6-8 hours. *Arrange for a responsible adult to help you at home for at least 24 hours,  *Wear comfortable clothing. Leave jewelry, money, and other valuables at home. You may wear dentures, eyeglasses, and/or hearing aids. *Bring an overnight bag with you (just incase you need to spend the night.)     Post Procedure Instructions:  *No driving for 24 hours post procedure. *No heavy lifting (over 10 lbs) or strenuous activity for 48 hours. *No tub baths, swimming, hot tubs, or spas for 1 week. The band aid over cath site may be removed the day after procedure and site washed gently with soap and water. *The site may appear bruised/ discolored for a couple of weeks. A small knot may be present. You may experience tenderness or soreness in groin area. This may be relieved with the use of Tylenol. *If there is any visible blood at the site, hold pressure for 20 minutes. *Call the office if you should notice numbness, tingling, coldness, or loss of feeling in the area. Call the office if you have a fever within 2-3 days after procedure.       Remember, when you begin lifting things, use proper body mechanics and bend with your knees, centering the weight on your legs. Please call with any questions: [phone].  You may also contact the cath lab directly at [phone]

## 2020-07-20 NOTE — H&P (VIEW-ONLY)
Mirsad Husejnovic     1945       Elise Ortega MD, [facility] [place] [place] Date of Visit-7/20/2020 PCP is Heath Restrepo MD  
[address] [place] [address] [facility] [place] [facility] HPI:  Krishan Silva is a 69 y.o. male 6 month follow up of CAD and BMS 2018 right posterior lateral. Stents to the RPL 2/18 and RCA 11/ 2017.   
01/17/20 NUCLEAR CARDIAC STRESS TEST 01/22/2020 1/24/2020 Narrative · Gated SPECT: Left ventricular function post-stress was normal.  
Calculated ejection fraction is 66%. There is no evidence of transient  
ischemic dilation (TID). The TID ratio is 1.07. 
· Baseline ECG: Normal EKG, normal sinus rhythm. · Left ventricular perfusion is probably normal. 
· Negative myocardial perfusion imaging. Myocardial perfusion imaging  
supports a low risk stress test. 
· Myocardial perfusion imaging defect 1: There is a defect that is small  
in size with a mild reduction in uptake present in the mid inferior  
location(s) that is partially reversible. There is normal wall motion in  
the defect area. Viability in the area is good. caused by subdiaphragmatic  
activity. The possibility of artifact cannot be excluded. Perfusion defect  
was visually and quantitatively present. Signed by: Oneil Nevarez MD  
 
 
Pt is accompanied by his wife. Pt states that he has been trying to be active, but that he has had a pressure or pain in his chest that radiates up to the right side of his neck when he walks. This discomfort occurs everyday and most days he will take nitroglycerin to help relieve the pain. He states that he does a minimum of 10,000 steps a day. His wife notes that he has been moving slower. Pt has been monitoring his BP at home and reports that his SBP has been staying between 120 and 140, though he does note a day where his SBP was 110 after his walk. Pt has not had any blood work recently due to COVID-19. Denies edema, syncope or shortness of breath at rest, has no tachycardia, palpitations or sense of arrhythmia. EKG: SB, first degree AV block Assessment/Plan: 1. Coronary artery disease involving native coronary artery of native heart with unstable angina pectoris ([address] Increasing angina with exertion. Using nitro almost every day of the week. Has not tolerated Imdur or BB. Nuclear in January showed partial reversibility in the inferior wall. His previous stent was in the RCA and he has disease in the PLB. We have discussed options and will proceed with a cath with likely angioplasty. risks and benefits discussed 2/1000 serious life threatening risk includes stroke, heart attack leading to  death 1/100 prolong hospital stay above and bleeding, groin complications, infection, renals possible but  unlikely unless baseline renal dysfunction, tear in cardiac vessel, tamponade PCI 6-4/190 similar complications but benefits likely outweigh risk - AMB POC EKG ROUTINE W/ 12 LEADS, INTER & REP 2. Hypertension, essential 
He notes some episodes of feeling low BP after walking. Will consider change to 10 mg if persistent after cath. BP Readings from Last 6 Encounters:  
07/20/20 120/70  
01/09/20 122/65  
05/16/19 102/47  
11/15/18 110/52  
05/31/18 100/60  
03/01/18 120/60  
   
3. Hypercholesterolemia/statin intolerance Repeat labs today for fasting lipids. Previously did not take Lipitor but seems to be tolerating Crestor. Lab Results Component Value Date/Time LDL, calculated 131.4 (H) 11/04/2017 12:55 AM  
  
4. S/P drug eluting coronary stent placement and bare metal coronary artery stent 
-Hx of anxiety 
-Had previously stopped Plavix due to GI hemorrhage. 5. Iron deficiency anemia due to chronic blood loss Recheck labs today in anticipation of cath. There is a lung mass and a CT scan from early this year.  Followed by his hematologist. He saw Dr. Kenna Moncada 6/14/19. Hx of PUD s/p Billroth 2/19/78. Dr. Lukas Horton felt that there was improvement in left lower lobe infiltrate with no bronchiectasis Lab Results Component Value Date/Time HGB 11.8 (L) 02/02/2018 09:39 AM  
  
  
 
Impression: 1. Coronary artery disease involving native coronary artery of native heart with unstable angina pectoris ([facility] 2. Hypertension, essential   
3. Hypercholesteremia 4. S/P drug eluting coronary stent placement 5. S/p bare metal coronary artery stent 6. Iron deficiency anemia due to chronic blood loss 7. Statin intolerance Cardiac History:  
11/4/17: TTE: EF 55-60%  NWMA. Mild LVH  
 
ROS-except as noted above. . A complete cardiac and respiratory are reviewed and negative except as above ; Resp-denies wheezing  or productive cough,. Const- No unusual weight loss or fever; Neuro-no recent seizure or CVA ; GI- No BRBPR, abdom pain, bloating ; - no  hematuria  
see supplement sheet, initialed and to be scanned by staff Past Medical History:  
Diagnosis Date  Anemia Dr Silviano St found ooze at site of Bilroth anastomosis, was on sucralfate, saw Dr Nick Neighbours  CAD in native artery 11/03/2017  
 admit [place], RCA 99% mid, PLB prox 90% collaterals from [facility]  Cataract  Diabetes ([facility] 11/03/2017 on metformin  Family history of coronary arteriosclerosis  Hemangioma   
 splenectomy-Mccormick  Hypertension, essential 12/6/2017  
 NSTEMI (non-ST elevated myocardial infarction) ([facility] 11/03/2017  
 troponin 0.67  PUD (peptic ulcer disease) 1977 Bilroth 2  
 S/p bare metal coronary artery stent 12/6/2017  S/P drug eluting coronary stent placement 3/25/2018  Statin intolerance 11/30/2017  
 declined Lipitor post BMS Social Hx= reports that he has never smoked. He has never used smokeless tobacco. He reports that he does not drink alcohol or use drugs.   
 
Exam and Labs:  /70 (BP 1 Location: Left arm, BP Patient Position: Sitting)   Pulse (!) 58   Resp 16   Ht 6' 1\" (1.854 m)   Wt 181 lb 3.2 oz (82.2 kg)   SpO2 97%   BMI 23.91 kg/m² Constitutional:  NAD, comfortable Head: NC,AT. Eyes: No scleral icterus. Neck:  Neck supple. No JVD present. Throat: moist mucous membranes. Chest: Effort normal & normal respiratory excursion . Neurological: alert, conversant and oriented . Skin: Skin is not cold. No obvious systemic rash noted. Not diaphoretic. No erythema. Psychiatric:  Grossly normal mood and affect. Behavior appears normal. Extremities:  a few venous varicosities no clubbing or cyanosis. Abdomen: non distended Lungs:breath sounds normal. No stridor. distress, wheezes or  Rales. Heart: normal rate, regular rhythm, normal S1, S2, no murmurs, rubs, clicks or gallops , PMI non displaced. Edema: Edema is none . Lab Results Component Value Date/Time Cholesterol, total 212 (H) 11/04/2017 12:55 AM  
 HDL Cholesterol 62 11/04/2017 12:55 AM  
 LDL, calculated 131.4 (H) 11/04/2017 12:55 AM  
 Triglyceride 93 11/04/2017 12:55 AM  
 CHOL/HDL Ratio 3.4 11/04/2017 12:55 AM  
 
Lab Results Component Value Date/Time Sodium 143 02/02/2018 09:39 AM  
 Potassium 3.8 02/02/2018 09:39 AM  
 Chloride 105 02/02/2018 09:39 AM  
 CO2 31 02/02/2018 09:39 AM  
 Anion gap 7 02/02/2018 09:39 AM  
 Glucose 169 (H) 02/02/2018 09:39 AM  
 BUN 14 02/02/2018 09:39 AM  
 Creatinine 0.67 (L) 02/02/2018 09:39 AM  
 BUN/Creatinine ratio 21 (H) 02/02/2018 09:39 AM  
 GFR est AA >60 02/02/2018 09:39 AM  
 GFR est non-AA >60 02/02/2018 09:39 AM  
 Calcium 8.5 02/02/2018 09:39 AM  
  
Wt Readings from Last 3 Encounters:  
07/20/20 181 lb 3.2 oz (82.2 kg) 01/17/20 181 lb (82.1 kg) 01/09/20 181 lb (82.1 kg) BP Readings from Last 3 Encounters:  
07/20/20 120/70  
01/09/20 122/65  
05/16/19 102/47 Current Outpatient Medications Medication Sig  
 lisinopriL (PRINIVIL, ZESTRIL) 20 mg tablet TAKE 1 TABLET BY MOUTH EVERY DAY  
  nitroglycerin (NITROSTAT) 0.4 mg SL tablet 1 Tab by SubLINGual route every five (5) minutes as needed for Chest Pain. Do not exceed 3 doses in 24 hours.  ferrous sulfate 325 mg (65 mg iron) tablet Take  by mouth two (2) times a day.  cholecalciferol (VITAMIN D3) 1,000 unit cap Take 1,000 Units by mouth daily.  rosuvastatin (CRESTOR) 10 mg tablet Take 10 mg by mouth nightly.  aspirin 81 mg chewable tablet Take 1 Tab by mouth daily.  Omega-3 Fatty Acids 300 mg cap Take 1 Tab by mouth daily.  metFORMIN (GLUCOPHAGE) 500 mg tablet Take 500 mg by mouth four (4) times daily (with meals).  GLIPIZIDE PO Take 5 mg by mouth three (3) times daily.  sucralfate (CARAFATE) 100 mg/mL suspension Take 1 g by mouth four (4) times daily. No current facility-administered medications for this visit. Impression see above.   
  
Written by Jose R Hernandez, as dictated by Lucita Washington MD.

## 2020-07-21 LAB
ALBUMIN SERPL-MCNC: 3.9 G/DL (ref 3.7–4.7)
ALP SERPL-CCNC: 63 IU/L (ref 39–117)
ALT SERPL-CCNC: 13 IU/L (ref 0–44)
AST SERPL-CCNC: 28 IU/L (ref 0–40)
BASOPHILS # BLD AUTO: 0.1 X10E3/UL (ref 0–0.2)
BASOPHILS NFR BLD AUTO: 1 %
BILIRUB DIRECT SERPL-MCNC: 0.16 MG/DL (ref 0–0.4)
BILIRUB SERPL-MCNC: 0.5 MG/DL (ref 0–1.2)
BUN SERPL-MCNC: 12 MG/DL (ref 8–27)
BUN/CREAT SERPL: 21 (ref 10–24)
CALCIUM SERPL-MCNC: 9.4 MG/DL (ref 8.6–10.2)
CHLORIDE SERPL-SCNC: 100 MMOL/L (ref 96–106)
CHOLEST SERPL-MCNC: 128 MG/DL (ref 100–199)
CO2 SERPL-SCNC: 26 MMOL/L (ref 20–29)
CREAT SERPL-MCNC: 0.58 MG/DL (ref 0.76–1.27)
EOSINOPHIL # BLD AUTO: 0.3 X10E3/UL (ref 0–0.4)
EOSINOPHIL NFR BLD AUTO: 4 %
ERYTHROCYTE [DISTWIDTH] IN BLOOD BY AUTOMATED COUNT: 13.1 % (ref 11.6–15.4)
GLUCOSE SERPL-MCNC: 132 MG/DL (ref 65–99)
HCT VFR BLD AUTO: 35.4 % (ref 37.5–51)
HDLC SERPL-MCNC: 60 MG/DL
HGB BLD-MCNC: 11.7 G/DL (ref 13–17.7)
IMM GRANULOCYTES # BLD AUTO: 0 X10E3/UL (ref 0–0.1)
IMM GRANULOCYTES NFR BLD AUTO: 0 %
INTERPRETATION, 910389: NORMAL
IRON SERPL-MCNC: 33 UG/DL (ref 38–169)
LDLC SERPL CALC-MCNC: 56 MG/DL (ref 0–99)
LYMPHOCYTES # BLD AUTO: 3.4 X10E3/UL (ref 0.7–3.1)
LYMPHOCYTES NFR BLD AUTO: 40 %
MCH RBC QN AUTO: 28.5 PG (ref 26.6–33)
MCHC RBC AUTO-ENTMCNC: 33.1 G/DL (ref 31.5–35.7)
MCV RBC AUTO: 86 FL (ref 79–97)
MONOCYTES # BLD AUTO: 0.7 X10E3/UL (ref 0.1–0.9)
MONOCYTES NFR BLD AUTO: 9 %
NEUTROPHILS # BLD AUTO: 4 X10E3/UL (ref 1.4–7)
NEUTROPHILS NFR BLD AUTO: 46 %
PLATELET # BLD AUTO: 324 X10E3/UL (ref 150–450)
POTASSIUM SERPL-SCNC: 4.4 MMOL/L (ref 3.5–5.2)
PROT SERPL-MCNC: 6.4 G/DL (ref 6–8.5)
RBC # BLD AUTO: 4.11 X10E6/UL (ref 4.14–5.8)
SARS-COV-2, COV2NT: NOT DETECTED
SODIUM SERPL-SCNC: 142 MMOL/L (ref 134–144)
TRIGL SERPL-MCNC: 59 MG/DL (ref 0–149)
VLDLC SERPL CALC-MCNC: 12 MG/DL (ref 5–40)
WBC # BLD AUTO: 8.5 X10E3/UL (ref 3.4–10.8)

## 2020-07-22 NOTE — PROGRESS NOTES
Pre cath labs are all ok for cath  Does have improved anemia, but still iron deficient  Wife will want numbers

## 2020-07-22 NOTE — PROGRESS NOTES
Pre cath is negative  Future Appointments  9/1/2020   8:40 AM    Ember Headley MD       [place] [address]

## 2020-07-23 ENCOUNTER — TELEPHONE (OUTPATIENT)
Dept: CARDIOLOGY CLINIC | Age: 75
End: 2020-07-23

## 2020-07-23 RX ORDER — SODIUM CHLORIDE 0.9 % (FLUSH) 0.9 %
5-40 SYRINGE (ML) INJECTION EVERY 8 HOURS
Status: CANCELLED | OUTPATIENT
Start: 2020-07-23

## 2020-07-23 RX ORDER — SODIUM CHLORIDE 0.9 % (FLUSH) 0.9 %
5-40 SYRINGE (ML) INJECTION AS NEEDED
Status: CANCELLED | OUTPATIENT
Start: 2020-07-23

## 2020-07-23 RX ORDER — SODIUM CHLORIDE 9 MG/ML
1000 INJECTION, SOLUTION INTRAVENOUS CONTINUOUS
Status: CANCELLED | OUTPATIENT
Start: 2020-07-23 | End: 2020-07-23

## 2020-07-23 NOTE — PROGRESS NOTES
Two patient identifiers verified. Per MD patient called and given results. Patient verbalized understanding and denies any further questions or concerns at this time.

## 2020-07-27 ENCOUNTER — TELEPHONE (OUTPATIENT)
Dept: CARDIOLOGY CLINIC | Age: 75
End: 2020-07-27

## 2020-07-27 NOTE — TELEPHONE ENCOUNTER
Patient calling to see if his cath has been approved by his insurance    He can be reached at [phone]    [facility]

## 2020-07-27 NOTE — TELEPHONE ENCOUNTER
Verified patient with two types of identifiers. Let patient know I reached out to the authorization department and will let him know. Patient verbalized understanding and will call with any other questions.

## 2020-07-28 ENCOUNTER — TELEPHONE (OUTPATIENT)
Dept: CARDIOLOGY CLINIC | Age: 75
End: 2020-07-28

## 2020-07-28 NOTE — TELEPHONE ENCOUNTER
Patient is requesting to speak with Jackson Kendall before surgery tomorrow.      Phone: [phone]

## 2020-07-29 ENCOUNTER — HOSPITAL ENCOUNTER (OUTPATIENT)
Age: 75
Setting detail: OBSERVATION
Discharge: HOME | End: 2020-07-30
Attending: SPECIALIST | Admitting: INTERNAL MEDICINE
Payer: MEDICARE

## 2020-07-29 DIAGNOSIS — I25.110 UNSTABLE ANGINA PECTORIS DUE TO CORONARY ARTERIOSCLEROSIS (HCC): ICD-10-CM

## 2020-07-29 PROBLEM — Z98.890 STATUS POST CARDIAC CATHETERIZATION: Status: ACTIVE | Noted: 2020-07-29

## 2020-07-29 PROBLEM — I25.10 CAD (CORONARY ARTERY DISEASE): Status: ACTIVE | Noted: 2020-07-29

## 2020-07-29 LAB
ACT BLD: 142 SECS (ref 79–138)
GLUCOSE BLD STRIP.AUTO-MCNC: 128 MG/DL (ref 65–100)
GLUCOSE BLD STRIP.AUTO-MCNC: 142 MG/DL (ref 65–100)
GLUCOSE BLD STRIP.AUTO-MCNC: 158 MG/DL (ref 65–100)
SERVICE CMNT-IMP: ABNORMAL

## 2020-07-29 PROCEDURE — 74011250637 HC RX REV CODE- 250/637: Performed by: PHYSICIAN ASSISTANT

## 2020-07-29 PROCEDURE — 77030002996 HC SUT SLK J&J -A: Performed by: SPECIALIST

## 2020-07-29 PROCEDURE — 85347 COAGULATION TIME ACTIVATED: CPT

## 2020-07-29 PROCEDURE — 82962 GLUCOSE BLOOD TEST: CPT

## 2020-07-29 PROCEDURE — 99218 HC RM OBSERVATION: CPT

## 2020-07-29 PROCEDURE — 77030013797 HC KT TRNSDUC PRSSR EDWD -A: Performed by: SPECIALIST

## 2020-07-29 PROCEDURE — C1725 CATH, TRANSLUMIN NON-LASER: HCPCS | Performed by: SPECIALIST

## 2020-07-29 PROCEDURE — 93041 RHYTHM ECG TRACING: CPT

## 2020-07-29 PROCEDURE — 92928 PRQ TCAT PLMT NTRAC ST 1 LES: CPT | Performed by: INTERNAL MEDICINE

## 2020-07-29 PROCEDURE — 92978 ENDOLUMINL IVUS OCT C 1ST: CPT | Performed by: INTERNAL MEDICINE

## 2020-07-29 PROCEDURE — 74011250637 HC RX REV CODE- 250/637: Performed by: SPECIALIST

## 2020-07-29 PROCEDURE — 99152 MOD SED SAME PHYS/QHP 5/>YRS: CPT | Performed by: SPECIALIST

## 2020-07-29 PROCEDURE — 77030029065 HC DRSG HEMO QCLOT ZMED -B

## 2020-07-29 PROCEDURE — 77030012468 HC VLV BLEEDBK CNTRL ABBT -B: Performed by: SPECIALIST

## 2020-07-29 PROCEDURE — C1887 CATHETER, GUIDING: HCPCS | Performed by: SPECIALIST

## 2020-07-29 PROCEDURE — C1769 GUIDE WIRE: HCPCS | Performed by: SPECIALIST

## 2020-07-29 PROCEDURE — C1874 STENT, COATED/COV W/DEL SYS: HCPCS | Performed by: SPECIALIST

## 2020-07-29 PROCEDURE — 74011636320 HC RX REV CODE- 636/320: Performed by: SPECIALIST

## 2020-07-29 PROCEDURE — 77030013715 HC INFL SYS MRTM -B: Performed by: SPECIALIST

## 2020-07-29 PROCEDURE — 74011250637 HC RX REV CODE- 250/637: Performed by: INTERNAL MEDICINE

## 2020-07-29 PROCEDURE — 99153 MOD SED SAME PHYS/QHP EA: CPT | Performed by: SPECIALIST

## 2020-07-29 PROCEDURE — C1753 CATH, INTRAVAS ULTRASOUND: HCPCS | Performed by: SPECIALIST

## 2020-07-29 PROCEDURE — 77030013744: Performed by: SPECIALIST

## 2020-07-29 PROCEDURE — 77030004538 HC CATH ANGI DX MP BSC -A: Performed by: SPECIALIST

## 2020-07-29 PROCEDURE — 74011000258 HC RX REV CODE- 258: Performed by: SPECIALIST

## 2020-07-29 PROCEDURE — 74011000250 HC RX REV CODE- 250: Performed by: SPECIALIST

## 2020-07-29 PROCEDURE — C1894 INTRO/SHEATH, NON-LASER: HCPCS | Performed by: SPECIALIST

## 2020-07-29 PROCEDURE — 93458 L HRT ARTERY/VENTRICLE ANGIO: CPT | Performed by: SPECIALIST

## 2020-07-29 PROCEDURE — 74011250636 HC RX REV CODE- 250/636: Performed by: SPECIALIST

## 2020-07-29 DEVICE — XIENCE SIERRA™ EVEROLIMUS ELUTING CORONARY STENT SYSTEM 3.50 MM X 12 MM / RAPID-EXCHANGE
Type: IMPLANTABLE DEVICE | Status: FUNCTIONAL
Brand: XIENCE SIERRA™

## 2020-07-29 RX ORDER — MIDAZOLAM HYDROCHLORIDE 1 MG/ML
INJECTION, SOLUTION INTRAMUSCULAR; INTRAVENOUS AS NEEDED
Status: DISCONTINUED | OUTPATIENT
Start: 2020-07-29 | End: 2020-07-29 | Stop reason: HOSPADM

## 2020-07-29 RX ORDER — LIDOCAINE HYDROCHLORIDE 10 MG/ML
INJECTION INFILTRATION; PERINEURAL AS NEEDED
Status: DISCONTINUED | OUTPATIENT
Start: 2020-07-29 | End: 2020-07-29 | Stop reason: HOSPADM

## 2020-07-29 RX ORDER — HEPARIN SODIUM 200 [USP'U]/100ML
INJECTION, SOLUTION INTRAVENOUS
Status: COMPLETED | OUTPATIENT
Start: 2020-07-29 | End: 2020-07-29

## 2020-07-29 RX ORDER — ACETAMINOPHEN 325 MG/1
650 TABLET ORAL
Status: DISCONTINUED | OUTPATIENT
Start: 2020-07-29 | End: 2020-07-29 | Stop reason: SDUPTHER

## 2020-07-29 RX ORDER — GUAIFENESIN 100 MG/5ML
81 LIQUID (ML) ORAL DAILY
Status: DISCONTINUED | OUTPATIENT
Start: 2020-07-30 | End: 2020-07-29 | Stop reason: SDUPTHER

## 2020-07-29 RX ORDER — FENTANYL CITRATE 50 UG/ML
INJECTION, SOLUTION INTRAMUSCULAR; INTRAVENOUS AS NEEDED
Status: DISCONTINUED | OUTPATIENT
Start: 2020-07-29 | End: 2020-07-29 | Stop reason: HOSPADM

## 2020-07-29 RX ORDER — SODIUM CHLORIDE 0.9 % (FLUSH) 0.9 %
5-40 SYRINGE (ML) INJECTION EVERY 8 HOURS
Status: DISCONTINUED | OUTPATIENT
Start: 2020-07-29 | End: 2020-07-29 | Stop reason: HOSPADM

## 2020-07-29 RX ORDER — MAGNESIUM SULFATE 100 %
4 CRYSTALS MISCELLANEOUS AS NEEDED
Status: DISCONTINUED | OUTPATIENT
Start: 2020-07-29 | End: 2020-07-30 | Stop reason: HOSPADM

## 2020-07-29 RX ORDER — SODIUM CHLORIDE 0.9 % (FLUSH) 0.9 %
5-40 SYRINGE (ML) INJECTION AS NEEDED
Status: DISCONTINUED | OUTPATIENT
Start: 2020-07-29 | End: 2020-07-30 | Stop reason: HOSPADM

## 2020-07-29 RX ORDER — SODIUM CHLORIDE 9 MG/ML
1000 INJECTION, SOLUTION INTRAVENOUS CONTINUOUS
Status: DISCONTINUED | OUTPATIENT
Start: 2020-07-29 | End: 2020-07-29 | Stop reason: HOSPADM

## 2020-07-29 RX ORDER — HEPARIN SODIUM 1000 [USP'U]/ML
INJECTION, SOLUTION INTRAVENOUS; SUBCUTANEOUS AS NEEDED
Status: DISCONTINUED | OUTPATIENT
Start: 2020-07-29 | End: 2020-07-29 | Stop reason: HOSPADM

## 2020-07-29 RX ORDER — INSULIN LISPRO 100 [IU]/ML
INJECTION, SOLUTION INTRAVENOUS; SUBCUTANEOUS
Status: DISCONTINUED | OUTPATIENT
Start: 2020-07-29 | End: 2020-07-30

## 2020-07-29 RX ORDER — SODIUM CHLORIDE 0.9 % (FLUSH) 0.9 %
5-40 SYRINGE (ML) INJECTION EVERY 8 HOURS
Status: DISCONTINUED | OUTPATIENT
Start: 2020-07-29 | End: 2020-07-30 | Stop reason: HOSPADM

## 2020-07-29 RX ORDER — SODIUM CHLORIDE 0.9 % (FLUSH) 0.9 %
5-40 SYRINGE (ML) INJECTION AS NEEDED
Status: DISCONTINUED | OUTPATIENT
Start: 2020-07-29 | End: 2020-07-29 | Stop reason: HOSPADM

## 2020-07-29 RX ORDER — SODIUM CHLORIDE 9 MG/ML
500 INJECTION, SOLUTION INTRAVENOUS CONTINUOUS
Status: DISPENSED | OUTPATIENT
Start: 2020-07-29 | End: 2020-07-29

## 2020-07-29 RX ORDER — CLOPIDOGREL BISULFATE 75 MG/1
75 TABLET ORAL DAILY
Status: DISCONTINUED | OUTPATIENT
Start: 2020-07-30 | End: 2020-07-29 | Stop reason: SDUPTHER

## 2020-07-29 RX ORDER — CLOPIDOGREL 300 MG/1
TABLET, FILM COATED ORAL AS NEEDED
Status: DISCONTINUED | OUTPATIENT
Start: 2020-07-29 | End: 2020-07-29 | Stop reason: HOSPADM

## 2020-07-29 RX ORDER — DEXTROSE MONOHYDRATE 100 MG/ML
0-250 INJECTION, SOLUTION INTRAVENOUS AS NEEDED
Status: DISCONTINUED | OUTPATIENT
Start: 2020-07-29 | End: 2020-07-30 | Stop reason: HOSPADM

## 2020-07-29 RX ORDER — ROSUVASTATIN CALCIUM 10 MG/1
10 TABLET, COATED ORAL
Status: DISCONTINUED | OUTPATIENT
Start: 2020-07-29 | End: 2020-07-29

## 2020-07-29 RX ORDER — SUCRALFATE 1 G/10ML
1 SUSPENSION ORAL 4 TIMES DAILY
Status: DISCONTINUED | OUTPATIENT
Start: 2020-07-29 | End: 2020-07-29 | Stop reason: CLARIF

## 2020-07-29 RX ORDER — GUAIFENESIN 100 MG/5ML
81 LIQUID (ML) ORAL DAILY
Status: DISCONTINUED | OUTPATIENT
Start: 2020-07-30 | End: 2020-07-30 | Stop reason: HOSPADM

## 2020-07-29 RX ORDER — SUCRALFATE 1 G/1
1 TABLET ORAL
Status: DISCONTINUED | OUTPATIENT
Start: 2020-07-29 | End: 2020-07-30 | Stop reason: HOSPADM

## 2020-07-29 RX ORDER — ROSUVASTATIN CALCIUM 10 MG/1
20 TABLET, COATED ORAL
Status: DISCONTINUED | OUTPATIENT
Start: 2020-07-29 | End: 2020-07-30 | Stop reason: HOSPADM

## 2020-07-29 RX ORDER — LISINOPRIL 20 MG/1
20 TABLET ORAL DAILY
Status: DISCONTINUED | OUTPATIENT
Start: 2020-07-30 | End: 2020-07-30 | Stop reason: HOSPADM

## 2020-07-29 RX ORDER — CLOPIDOGREL BISULFATE 75 MG/1
75 TABLET ORAL DAILY
Status: DISCONTINUED | OUTPATIENT
Start: 2020-07-30 | End: 2020-07-30 | Stop reason: HOSPADM

## 2020-07-29 RX ORDER — SODIUM CHLORIDE 9 MG/ML
50 INJECTION, SOLUTION INTRAVENOUS CONTINUOUS
Status: DISCONTINUED | OUTPATIENT
Start: 2020-07-29 | End: 2020-07-29

## 2020-07-29 RX ORDER — ACETAMINOPHEN 325 MG/1
650 TABLET ORAL
Status: DISCONTINUED | OUTPATIENT
Start: 2020-07-29 | End: 2020-07-30 | Stop reason: HOSPADM

## 2020-07-29 RX ADMIN — Medication 10 ML: at 21:53

## 2020-07-29 RX ADMIN — SODIUM CHLORIDE 500 ML: 900 INJECTION, SOLUTION INTRAVENOUS at 14:30

## 2020-07-29 RX ADMIN — SUCRALFATE 1 G: 1 TABLET ORAL at 21:54

## 2020-07-29 RX ADMIN — ROSUVASTATIN 20 MG: 10 TABLET, FILM COATED ORAL at 21:54

## 2020-07-29 NOTE — PROGRESS NOTES
Cardiac Cath Lab Recovery Arrival Note:      Lesly Doyle arrived to Cardiac Cath Lab, Recovery Area. Staff introduced to patient. Patient identifiers verified with NAME and DATE OF BIRTH. Procedure verified with patient. Consent forms reviewed and signed by patient or authorized representative and verified. Allergies verified. Patient and family oriented to department. Patient and family informed of procedure and plan of care. Questions answered with review. Patient prepped for procedure, per orders from physician, prior to arrival.    Patient on cardiac monitor, non-invasive blood pressure, SPO2 monitor. On room air. Patient is A&Ox 4. Patient reports no complaints. Patient in stretcher, in low position, with side rails up, call bell within reach, patient instructed to call if assistance as needed. Patient prep in: [address], [place]. Patient family has pager # [phone]  Family in: outside hospital.   Prep by: Valerie Poon RN  3276 Dr Martell Buerger in to talk with pt.   B/S 158

## 2020-07-29 NOTE — Clinical Note
Lesion: Located in the Mid RCA. Stent deployed. Multiple inflations used. First inflation pressure = 18 sayda; inflation time: 25 sec. Second inflation pressure: 20 sayda; inflation time: 16 sec.

## 2020-07-29 NOTE — PROGRESS NOTES
TRANSFER - IN REPORT:    Verbal report received from edy Vilchis on [facility]  being received from procedure for routine progression of care. Report consisted of patients Situation, Background, Assessment and Recommendations(SBAR). Information from the following report(s) Procedure Summary, MAR, Recent Results and Med Rec Status was reviewed with the receiving clinician. Opportunity for questions and clarification was provided. Assessment completed upon patients arrival to [address] [place] [address] and care assumed. Cardiac Cath Lab Recovery Arrival Note:    [facility] arrived to [facility] recovery area. Patient procedure= LHC/stent. Patient on cardiac monitor, non-invasive blood pressure, SPO2 monitor. On room air. IV  of nacl on pump at 75 ml/hr. Patient status doing well without problems. Patient is A&Ox 4. Patient reports no complaints. PROCEDURE SITE CHECK:    Procedure site:without any bleeding and or hematoma, no pain/discomfort reported at procedure site. No change in patient status. Continue to monitor patient and status.   Dr Billy Mxion talked with family on the phone

## 2020-07-29 NOTE — PROGRESS NOTES
SHEATH PULL NOTE:    Patient informed of procedure with questions answered with review. Sheath site prepped with Chloraprep swab. 6 fr sheath in right groin pulled by Polo Reynolds RN. Hand hold and quick clot, with manual compression to site. No bleeding, no hematoma, no pain at site. Hemostasis obtained with hand hold/manual compression at site. Patient tolerated well. No change in status. Handhold for 15 minutes. No change at site. Gauze and tegaderm dressing applied to site. No bleeding, no hematoma, no pain/discomfort at site. Groin instructions provided with review. Continue to monitor procedure site and patient status. *Advised patient to keep head flat and extremity flat to decrease risk of bleeding. *Recommended that patient not drink for ONE HOUR post sheath pull completion. *Recommended that patient not eat for TWO HOURS post sheath pull completion. *Instructed patient on rationale for delay of PO products to decrease risk for aspiration and if additional treatment to procedure site is required. Patient verbalized understanding of instructions with review.

## 2020-07-29 NOTE — PROGRESS NOTES
TRANSFER - OUT REPORT:    Verbal report given to Tesha Smith RN(name) on Kimo Nice  being transferred to [facility](unit) for routine progression of care       Report consisted of patients Situation, Background, Assessment and   Recommendations(SBAR). Information from the following report(s) Kardex, ED Summary, OR Summary, Procedure Summary, Intake/Output, MAR, Accordion, Recent Results, Med Rec Status and Cardiac Rhythm SR 1st degree was reviewed with the receiving nurse. Lines:   Peripheral IV 07/29/20 Right Antecubital (Active)        Opportunity for questions and clarification was provided.       Patient transported with:   Monitor  Registered Nurse   Transferred via stretcher to room 453 via stretcher    805 rec'd by Julia Washington right groin dsg D&I groin site soft

## 2020-07-29 NOTE — Clinical Note
Lesion located in the Mid RCA. Balloon inserted. Balloon inflated using multiple inflations inflation technique. Lesion #1: Pressure = 23 sayda; Duration = 60 sec. Inflation 2: Pressure = 20 sayda; Duration = 40 sec.

## 2020-07-29 NOTE — Clinical Note
Sheath #1: Sheath: left in place. Site secured by Tegaderm, suture and transparent dressing.  HOOKED UP TO PRESSURE

## 2020-07-29 NOTE — PROGRESS NOTES
Cardiac Cath Lab Procedure Area Arrival Note:    Nancy Pulliam arrived to Cardiac Cath Lab, Procedure Area. Patient identifiers verified with NAME and DATE OF BIRTH. Procedure verified with patient. Consent forms verified. Allergies verified. Patient informed of procedure and plan of care. Questions answered with review. Patient voiced understanding of procedure and plan of care. Patient on cardiac monitor, non-invasive blood pressure, SPO2 monitor. On RA and placed on O2 @ 2 lpm via NC.  IV of NSS on pump at 75 ml/hr. Patient status doing well without problems. Patient is A&Ox 4. Patient reports no complaints of chest pain or shortness of breath. Patient medicated during procedure with orders obtained and verified by Dr. David Weiss.    Refer to patients Cardiac Cath Lab PROCEDURE REPORT for vital signs, assessment, status, and response during procedure, printed at end of case. Printed report on chart or scanned into chart. 1635 Transfer to [address] [place] [address] from Procedure Area    Verbal report given to MANUEL Loyola on Nancy Pulliam being transferred to Cardiac Cath Lab  for routine progression of care   Patient is post [facility] and PCI of RCA procedure. Patient stable upon transfer to . Report consisted of patients Situation, Background, Assessment and   Recommendations(SBAR). Information from the following report(s) SBAR, Procedure Summary and MAR was reviewed with the receiving nurse. Opportunity for questions and clarification was provided. Patient medicated during procedure with orders obtained and verified by Dr. David Weiss and Dr. Kenney Sabillon. Refer to patient PROCEDURE REPORT for vital signs, assessment, status, and response during procedure.

## 2020-07-29 NOTE — TELEPHONE ENCOUNTER
Verified patient with two types of identifiers. Let patient and wife know the [place] [place] was approved. Patient and wife verbalized understanding and will call with any other questions.

## 2020-07-29 NOTE — Clinical Note
Lesion located in the Mid RCA. Balloon inflated using multiple inflations inflation technique. Lesion #1: Pressure = 12 sayda; Duration = 40 sec. Inflation 2: Pressure = 12 sayda; Duration = 48 sec.    SECOND INFLATION PROXIMAL RCA

## 2020-07-29 NOTE — INTERVAL H&P NOTE
Update History & Physical    The Patient's History and Physical of July 20, 2020 was reviewed with the patient and I examined the patient. There was cath done today. The surgical site was confirmed by the patient and me. The precath H and P was reviewed and signed before the case by [paper copy with scanning.   Now with admit, will amend the previous OV note   Cath shows lesion prox and distal in prior BMS to RCA, Moderate dz in smaller Circ  LVEF 70%  Hypotension mild with sedation, MAP >55 , given IVF NS  JOEL to distal lesion, difficult compliance to prox lesion, no effusion by echo  Admit to fu on BP  With recurrent ISR , needs JOEL , hx of GI bleed in past  Ask GI to see in am given potential risk and current anemia  Ask Heme to see ( Dr Mily Zepeda) for anemia , Hgb improved of late at 10-11  Likely dc tomorrow   If recurrent dz in RCA after these two stents then would pursue RADHA to RCA single vessel CABG  Discussed with wife and dtr Deon   Pt sleepy, post cath, arousable, moves all 4s, stable  now, symmetric smile    Electronically signed by Nurys Guerra MD on 7/29/2020 at 4:52 PM

## 2020-07-29 NOTE — PROGRESS NOTES
TRANSFER - IN REPORT:    Verbal report received from Hyun Vaz 411, tech on [facility]  being received from procedure for routine progression of care. Report consisted of patients Situation, Background, Assessment and Recommendations(SBAR). Information from the following report(s) Procedure Summary, MAR, Recent Results and Med Rec Status was reviewed with the receiving clinician. Opportunity for questions and clarification was provided. Assessment completed upon patients arrival to [address] [place] [address] and care assumed. Cardiac Cath Lab Recovery Arrival Note:    [facility] arrived to [facility] recovery area. Patient procedure= [facility]/stent. Patient on cardiac monitor, non-invasive blood pressure, SPO2 monitor. On O2 @ 2 lpm via n/c. IV  of nacl on pump at 75 ml/hr. Patient status doing well without problems. Patient is A&Ox 4. Patient reports no complaints. PROCEDURE SITE CHECK:    Procedure site:without any bleeding and or hematoma, no pain/discomfort reported at procedure site. No change in patient status. Continue to monitor patient and status.     Dr Heladio Cervantes talked with wife and daughter via phone

## 2020-07-29 NOTE — Clinical Note
Lesion located in the Mid RCA. Balloon inserted. Balloon inflated using multiple inflations inflation technique. Lesion #1: Pressure = 18 sayda; Duration = 66 sec. Inflation 2: Pressure = 24 sayda; Duration = 37 sec.

## 2020-07-30 VITALS
SYSTOLIC BLOOD PRESSURE: 113 MMHG | OXYGEN SATURATION: 97 % | WEIGHT: 175.49 LBS | TEMPERATURE: 98.2 F | BODY MASS INDEX: 23.26 KG/M2 | RESPIRATION RATE: 18 BRPM | HEART RATE: 68 BPM | DIASTOLIC BLOOD PRESSURE: 59 MMHG | HEIGHT: 73 IN

## 2020-07-30 LAB
ACT BLD: 137 SECS (ref 79–138)
ACT BLD: 142 SECS (ref 79–138)
ACT BLD: 312 SECS (ref 79–138)
ANION GAP SERPL CALC-SCNC: 7 MMOL/L (ref 5–15)
ATRIAL RATE: 67 BPM
BUN SERPL-MCNC: 23 MG/DL (ref 6–20)
BUN/CREAT SERPL: 49 (ref 12–20)
CALCIUM SERPL-MCNC: 8.2 MG/DL (ref 8.5–10.1)
CALCULATED P AXIS, ECG09: 53 DEGREES
CALCULATED R AXIS, ECG10: 21 DEGREES
CALCULATED T AXIS, ECG11: 63 DEGREES
CHLORIDE SERPL-SCNC: 108 MMOL/L (ref 97–108)
CO2 SERPL-SCNC: 24 MMOL/L (ref 21–32)
CREAT SERPL-MCNC: 0.47 MG/DL (ref 0.7–1.3)
DIAGNOSIS, 93000: NORMAL
ERYTHROCYTE [DISTWIDTH] IN BLOOD BY AUTOMATED COUNT: 14.2 % (ref 11.5–14.5)
GLUCOSE BLD STRIP.AUTO-MCNC: 130 MG/DL (ref 65–100)
GLUCOSE BLD STRIP.AUTO-MCNC: 138 MG/DL (ref 65–100)
GLUCOSE BLD STRIP.AUTO-MCNC: 206 MG/DL (ref 65–100)
GLUCOSE SERPL-MCNC: 183 MG/DL (ref 65–100)
HCT VFR BLD AUTO: 25.6 % (ref 36.6–50.3)
HCT VFR BLD AUTO: 28.6 % (ref 36.6–50.3)
HGB BLD-MCNC: 8.2 G/DL (ref 12.1–17)
HGB BLD-MCNC: 9 G/DL (ref 12.1–17)
MCH RBC QN AUTO: 28.7 PG (ref 26–34)
MCHC RBC AUTO-ENTMCNC: 32 G/DL (ref 30–36.5)
MCV RBC AUTO: 89.5 FL (ref 80–99)
NRBC # BLD: 0 K/UL (ref 0–0.01)
NRBC BLD-RTO: 0 PER 100 WBC
P-R INTERVAL, ECG05: 232 MS
PLATELET # BLD AUTO: 241 K/UL (ref 150–400)
PMV BLD AUTO: 10.9 FL (ref 8.9–12.9)
POTASSIUM SERPL-SCNC: 3.6 MMOL/L (ref 3.5–5.1)
Q-T INTERVAL, ECG07: 408 MS
QRS DURATION, ECG06: 80 MS
QTC CALCULATION (BEZET), ECG08: 431 MS
RBC # BLD AUTO: 2.86 M/UL (ref 4.1–5.7)
SERVICE CMNT-IMP: ABNORMAL
SODIUM SERPL-SCNC: 139 MMOL/L (ref 136–145)
VENTRICULAR RATE, ECG03: 67 BPM
WBC # BLD AUTO: 8.2 K/UL (ref 4.1–11.1)

## 2020-07-30 PROCEDURE — 80048 BASIC METABOLIC PNL TOTAL CA: CPT

## 2020-07-30 PROCEDURE — 74011250637 HC RX REV CODE- 250/637: Performed by: PHYSICIAN ASSISTANT

## 2020-07-30 PROCEDURE — 94760 N-INVAS EAR/PLS OXIMETRY 1: CPT

## 2020-07-30 PROCEDURE — 74011250637 HC RX REV CODE- 250/637: Performed by: INTERNAL MEDICINE

## 2020-07-30 PROCEDURE — 82962 GLUCOSE BLOOD TEST: CPT

## 2020-07-30 PROCEDURE — 99218 HC RM OBSERVATION: CPT

## 2020-07-30 PROCEDURE — 36415 COLL VENOUS BLD VENIPUNCTURE: CPT

## 2020-07-30 PROCEDURE — 85018 HEMOGLOBIN: CPT

## 2020-07-30 PROCEDURE — 85027 COMPLETE CBC AUTOMATED: CPT

## 2020-07-30 RX ORDER — PANTOPRAZOLE SODIUM 40 MG/1
40 TABLET, DELAYED RELEASE ORAL
Status: DISCONTINUED | OUTPATIENT
Start: 2020-07-30 | End: 2020-07-30 | Stop reason: HOSPADM

## 2020-07-30 RX ORDER — CLOPIDOGREL BISULFATE 75 MG/1
75 TABLET ORAL DAILY
Qty: 30 TAB | Refills: 3 | Status: SHIPPED | OUTPATIENT
Start: 2020-07-31 | End: 2020-11-20

## 2020-07-30 RX ORDER — ROSUVASTATIN CALCIUM 20 MG/1
20 TABLET, COATED ORAL
Qty: 30 TAB | Refills: 1 | Status: SHIPPED | OUTPATIENT
Start: 2020-07-30 | End: 2021-02-22

## 2020-07-30 RX ORDER — GLIPIZIDE 5 MG/1
5 TABLET ORAL 3 TIMES DAILY
Status: DISCONTINUED | OUTPATIENT
Start: 2020-07-30 | End: 2020-07-30 | Stop reason: HOSPADM

## 2020-07-30 RX ADMIN — SUCRALFATE 1 G: 1 TABLET ORAL at 06:34

## 2020-07-30 RX ADMIN — LISINOPRIL 20 MG: 20 TABLET ORAL at 09:14

## 2020-07-30 RX ADMIN — PANTOPRAZOLE SODIUM 40 MG: 40 TABLET, DELAYED RELEASE ORAL at 15:31

## 2020-07-30 RX ADMIN — ACETAMINOPHEN 650 MG: 325 TABLET ORAL at 02:07

## 2020-07-30 RX ADMIN — Medication 10 ML: at 06:51

## 2020-07-30 RX ADMIN — CLOPIDOGREL BISULFATE 75 MG: 75 TABLET ORAL at 09:16

## 2020-07-30 RX ADMIN — GLIPIZIDE 5 MG: 5 TABLET ORAL at 16:47

## 2020-07-30 RX ADMIN — SUCRALFATE 1 G: 1 TABLET ORAL at 11:50

## 2020-07-30 RX ADMIN — Medication 10 ML: at 15:30

## 2020-07-30 RX ADMIN — GLIPIZIDE 5 MG: 5 TABLET ORAL at 13:06

## 2020-07-30 RX ADMIN — ASPIRIN 81 MG CHEWABLE TABLET 81 MG: 81 TABLET CHEWABLE at 09:15

## 2020-07-30 RX ADMIN — SUCRALFATE 1 G: 1 TABLET ORAL at 16:48

## 2020-07-30 NOTE — PROGRESS NOTES
Reason for Admission:  Patient is s/p stent placement                     RUR Score:  OBS status, no re-admission score listed                   Plan for utilizing home health:          PCP: First and Last name:  Dr. Trae Sanchez    [facility]   Are you a current patient: Yes/No: Yes   Approximate date of last visit: N/A   Can you participate in a virtual visit with your PCP: N/A                    Current Advanced Directive/Advance Care Plan: Full Code, not on file- spouse is NOK                         Transition of Care Plan:  Home    The CM met with the patient at bedside in order to introduce the role of CM and assess for patient needs. The patient lives at home with his wife and family, verified demographics and insurance information. The patient endorses being independent with ADLs and mobility. The patient utilizes [facility] pharmacy for prescriptions, denied difficulty accessing medications prior to hospitalization. The patient endorses his wife or daughter will transport home, denied any concerns for transition home when medically stable. CM will follow for transitions of care. DEONTE Reed     Observation notice provided in writing to patient and/or caregiver as well as verbal explanation of the policy. Patients who are in outpatient status also receive the Observation notice. Care Management Interventions  PCP Verified by CM: Yes(Patient verified PCP as Dr. Trae Sanchez )  Mode of Transport at Discharge:  Other (see comment)(Patient's spouse and daughter will transport home )  Transition of Care Consult (CM Consult): Discharge Planning  MyChart Signup: Yes  Physical Therapy Consult: No  Occupational Therapy Consult: No  Current Support Network: Own Home, Lives with Spouse  Confirm Follow Up Transport: Family  Discharge Location  Discharge Placement: Home

## 2020-07-30 NOTE — PROCEDURES
Findings:  1. Severe native one-vessel coronary artery disease involving in-stent restenosis in proximal and midportion of right coronary artery stent. On AN imaging, proximal right coronary artery stent segment is underexpanded mid eccentric dilatation and calcification on the exterior aspect of underexpanded/underexpanded segment. There is another mildly underexpanded segment in the mid RCA, which opened very well with high-pressure balloon dilatation. 2.  Mild disease in left coronary circulation  3. IVUS guided intervention on proximal to mid RCA stent. Mid RCA stent segment expanded valve with high-pressure balloon dilatation and was treated with placement of new 3.5 x 12 mm Xience drug-eluting stent within a stent segment at 20 sayda. Proximal portion of the stent had understand banded segment which he did not adequately dilate with high-pressure balloon(4 mm balloon at 24 sayda as well as cutting balloon). Hence it was decided not to place another stent in the segment. Should the patient develop future recurrent symptoms potential consideration can be made to use laser to dilate the stent further. However given that minimal luminal dilation at this level are 2 mm, I am uncertain if laser will provide enough modification of heart rate outside the stent. Access:  Right common femoral artery    Contrast: Intervention 30 cc    Recommendations  1. Plavix and aspirin based dual antiplatelet therapy  2.   Guideline directed medical therapy for secondary prevention of coronary artery disease

## 2020-07-30 NOTE — CONSULTS
[address] Domonique Lewis NOTE  Will Del Gutierrez  [phone] office  [phone] NP/PA in-hospital cell phone M-F until 4:30PM  After 5PM or on weekends, please call  for physician on call        NAME:  Soraya Thomason   :   1945   MRN:   [id number]       Referring Physician: LILLY Rosales    Consult Date: 2020 9:41 AM    Chief Complaint: anemia     History of Present Illness:  Patient is a 69 y.o. who is seen in consultation at the request of LLILY Rosales, for chronic blood loss. Patient has a past medical history significant for coronary artery disease status post stent, hypertension, and hypercholesterolemia. Patient was admitted to the hospital on 20 and underwent cardiac catheterization with placement of new stent. Patient reports a longstanding history of anemia for which he has been previously evaluated by hematology and gastroenterology (Dr. Yelitza Melendez). Patient reports his last colonoscopy [facility] . History of EGD (unknown date). History of capsule endoscopy several years ago. Hgb was 11.7 on  and 8.2 today. No nausea, reflux, or vomiting. No dysphagia or odynophagia. No change in bowel habits, melena, or hematochezia. No fevers, chills, or unexplained weight loss. Patient reports that he is on oral iron supplementation 2 tabs daily. No NSAID use. Patient was on aspirin 81 mg daily prior to admission. Plavix has been started, however refused AM dose. Remote history of Billroth II. I have reviewed the emergency room note, hospital admission note, notes by all other clinicians who have seen the patient during this hospitalization to date. I have reviewed the problem list and the reason for this hospitalization. I have reviewed the allergies and the medications the patient was taking at home prior to this hospitalization.       PMH:  Past Medical History:   Diagnosis Date    Anemia     Dr Ashlyn Mcmullen found ooze at site of Bilroth anastomosis, was on sucralfate, saw Dr Lee Langston CAD in native artery 2017    admit [place], RCA 99% mid, PLB prox 90% collaterals from [address] Diabetes ([facility] [address] 2017    on metformin    Family history of coronary arteriosclerosis     Hemangioma     splenectomy-Mccormick    Hypertension, essential 2017    NSTEMI (non-ST elevated myocardial infarction) ([facility] [address] [facility] 2017    troponin 0.67    PUD (peptic ulcer disease)     Bilroth 2    S/p bare metal coronary artery stent 2017    S/P drug eluting coronary stent placement 3/25/2018    Statin intolerance 2017    declined Lipitor post BMS       PSH:  Past Surgical History:   Procedure Laterality Date    HX OTHER SURGICAL      splenectomy       Allergies: Allergies   Allergen Reactions    Sulfa (Sulfonamide Antibiotics) Hives       Home Medications:  Prior to Admission Medications   Prescriptions Last Dose Informant Patient Reported? Taking? GLIPIZIDE PO 2020 at 600  Yes Yes   Sig: Take 5 mg by mouth three (3) times daily. Omega-3 Fatty Acids 300 mg cap 2020 at 600  Yes Yes   Sig: Take 1 Tab by mouth daily. aspirin 81 mg chewable tablet 2020 at 600  Yes Yes   Sig: Take 1 Tab by mouth daily. cholecalciferol (VITAMIN D3) 1,000 unit cap 2020 at 600  Yes Yes   Sig: Take 1,000 Units by mouth daily. ferrous sulfate 325 mg (65 mg iron) tablet 2020 at 600  Yes Yes   Sig: Take  by mouth two (2) times a day. lisinopriL (PRINIVIL, ZESTRIL) 20 mg tablet 2020 at 600  No Yes   Sig: TAKE 1 TABLET BY MOUTH EVERY DAY   metFORMIN (GLUCOPHAGE) 500 mg tablet 2020 at 1800  Yes No   Sig: Take 500 mg by mouth four (4) times daily (with meals). nitroglycerin (NITROSTAT) 0.4 mg SL tablet 2020 at 730  No Yes   Si Tab by SubLINGual route every five (5) minutes as needed for Chest Pain.  Do not exceed 3 doses in 24 hours.   rosuvastatin (CRESTOR) 10 mg tablet 7/28/2020 at 2200  Yes Yes   Sig: Take 10 mg by mouth nightly. sucralfate (CARAFATE) 100 mg/mL suspension 7/27/2020 at 2200  Yes No   Sig: Take 1 g by mouth four (4) times daily.       Facility-Administered Medications: None       Hospital Medications:  Current Facility-Administered Medications   Medication Dose Route Frequency    acetaminophen (TYLENOL) tablet 650 mg  650 mg Oral Q4H PRN    aspirin chewable tablet 81 mg  81 mg Oral DAILY    lisinopriL (PRINIVIL, ZESTRIL) tablet 20 mg  20 mg Oral DAILY    insulin lispro (HUMALOG) injection   SubCUTAneous AC&HS    glucose chewable tablet 16 g  4 Tab Oral PRN    glucagon (GLUCAGEN) injection 1 mg  1 mg IntraMUSCular PRN    dextrose 10% infusion 0-250 mL  0-250 mL IntraVENous PRN    sodium chloride (NS) flush 5-40 mL  5-40 mL IntraVENous Q8H    sodium chloride (NS) flush 5-40 mL  5-40 mL IntraVENous PRN    clopidogreL (PLAVIX) tablet 75 mg  75 mg Oral DAILY    rosuvastatin (CRESTOR) tablet 20 mg  20 mg Oral QHS    sucralfate (CARAFATE) tablet 1 g  1 g Oral AC&HS       Social History:  Social History     Tobacco Use    Smoking status: Never Smoker    Smokeless tobacco: Never Used   Substance Use Topics    Alcohol use: No       Family History:  Family History   Problem Relation Age of Onset    Heart Attack Father 79       Review of Systems:  Constitutional: negative fever, negative chills, negative weight loss  Eyes:   negative visual changes  ENT:   negative sore throat, tongue or lip swelling  Respiratory:  negative cough, negative dyspnea  Cards:  negative for chest pain, palpitations, lower extremity edema  GI:   See HPI  :  negative for frequency, dysuria  Integument:  negative for rash and pruritus  Heme:  negative for easy bruising and gum/nose bleeding  Musculoskeletal:negative for myalgias, back pain and muscle weakness  Neuro:  negative for headaches, dizziness  Psych: negative for feelings of anxiety, depression     Objective:     Patient Vitals for the past 8 hrs:   BP Temp Pulse Resp SpO2 Weight   07/30/20 0747 114/63 98.1 °F (36.7 °C) 62 18 97 % --   07/30/20 0330 -- -- -- -- -- 79.6 kg (175 lb 7.8 oz)   07/30/20 0321 110/41 98.3 °F (36.8 °C) 71 15 98 % --     No intake/output data recorded. 07/28 1901 - 07/30 0700  In: 220 [P.O.:220]  Out: 250 [Urine:250]    EXAM:     CONST:  Pleasant male lying in bed, no acute distress   NEURO:  Alert and oriented   HEENT: EOMI, no scleral icterus   LUNGS: No respiratory distress   CARD:  S1 S2   ABD:  Soft, non distended, no tenderness, no rebound, no guarding. + Bowel sounds. EXT:  Warm   PSYCH: Full, not anxious or agitated     Data Review     Recent Labs     07/30/20  0157   WBC 8.2   HGB 8.2*   HCT 25.6*        Recent Labs     07/30/20  0157      K 3.6      CO2 24   BUN 23*   CREA 0.47*   *   CA 8.2*     No results for input(s): AP, TBIL, TP, ALB, GLOB, GGT, AML, LPSE in the last 72 hours. No lab exists for component: SGOT, GPT, AMYP, HLPSE  No results for input(s): INR, PTP, APTT, INREXT in the last 72 hours. Assessment:   · Iron deficiency anemia: Hgb 8.2 (11.7 on 7/20), platelets 343. Iron 33. Remote history of EGD, colonoscopy, capsule endoscopy, and hematology evaluation. On oral iron supplementation. No signs of active GI bleeding.    · Coronary artery disease status post cardiac cath with stent placement (7/29/20)     Patient Active Problem List   Diagnosis Code    PUD (peptic ulcer disease) K27.9    Family history of coronary arteriosclerosis Z82.49    Diabetes ([facility] [address] [facility] E11.9    Hemangioma D18.00    Cataract H26.9    Anemia D64.9    Chest pain R07.9    S/p bare metal coronary artery stent Z95.5    CAD in native artery I25.10    Hypertension, essential I10    S/P drug eluting coronary stent placement Z95.5    Status post cardiac catheterization Z98.890    CAD (coronary artery disease) I25.10     Plan: · Trend CBC and transfuse as necessary. Monitor for signs of active GI bleeding. · Hematology consulted  · Next consider endoscopic evaluation  · Discussed with cardiology and discharge planned for today  · Patient was discussed with and will be seen by Dr. Giovanny Solis  · Thank you for allowing me to participate in care of [address]     Signed By: Jonggeri Lawson [facility]     7/30/2020  9:41 AM       Gastroenterology Attending Physician attestation statement and comments. This patient was seen and examined by me in a face-to-face visit today. I reviewed the medical record including lab work, imaging and other provider notes. I confirmed the history as described above. I spoke to the patient, reviewed the medical record including lab work, imaging and other provider notes. I discussed this case in detail with Shaneka CARR. I formulated an  assessment of this patient and developed a treatment plan. I agree with the above consultation note. I agree with the history, exam and assessment and plan as outlined in the note. I would like to add the following:     Abd: normoactive BS, nt, nd, no rebound/guarding. Chronic history iron deficiency anemia. Previous bleeding from Billroth II site. No active bleeding, on ASA/Plavix. Current hemoglobin drop likely dilutional. Plan for discharge if hemoglobin stable. Pt should continue outpatient iron supplementation. Pt should take PPI 40 mg daily. Outpatient follow-up to determine timing of EGD +/- colonoscopy based on when his last colonoscopy was. Hematology outpatient follow-up.     Dr. Giovanny Solis

## 2020-07-30 NOTE — PROGRESS NOTES
12 lead EKG completed.   Reviewed d/c instructions with pt instructions sheet for cath site care,stent card and bandaids, reviewed with daughter via the phone

## 2020-07-30 NOTE — PROGRESS NOTES
0730: Bedside shift change report given to Romana Creighton (oncoming nurse) by Beryle Caprice RN (offgoing nurse). Report included the following information SBAR, Kardex, Intake/Output, MAR, Recent Results, and Cardiac Rhythm NSR  .     1740: I have reviewed discharge instructions with the patient. The patient verbalized understanding.         Problem: Cath Lab Procedures: Post-Cath Day 1  Goal: Activity/Safety  Outcome: Progressing Towards Goal  Goal: Diagnostic Test/Procedures  Outcome: Progressing Towards Goal  Goal: Nutrition/Diet  Outcome: Progressing Towards Goal  Goal: Discharge Planning  Outcome: Progressing Towards Goal  Goal: Medications  Outcome: Progressing Towards Goal  Goal: Respiratory  Outcome: Progressing Towards Goal  Goal: Treatments/Interventions/Procedures  Outcome: Progressing Towards Goal  Goal: Psychosocial  Outcome: Progressing Towards Goal     Problem: Cath Lab Procedures: Discharge Outcomes  Goal: *Stable cardiac rhythm  Outcome: Progressing Towards Goal  Goal: *Hemodynamically stable  Outcome: Progressing Towards Goal  Goal: *Optimal pain control at patient's stated goal  Outcome: Progressing Towards Goal  Goal: *Pulses palpable, skin color within defined limits, skin temperature warm  Outcome: Progressing Towards Goal  Goal: *Lungs clear or at baseline  Outcome: Progressing Towards Goal  Goal: *Demonstrates ability to perform prescribed activity without shortness of breath or discomfort  Outcome: Progressing Towards Goal  Goal: *Verbalizes home exercise program, activity guidelines, cardiac precautions  Outcome: Progressing Towards Goal  Goal: *Verbalizes understanding and describes prescribed diet  Outcome: Progressing Towards Goal  Goal: *Verbalizes understanding and describes medication purposes and frequencies  Outcome: Progressing Towards Goal  Goal: *Identifies cardiac risk factors  Outcome: Progressing Towards Goal  Goal: *No signs and symptoms of infection or wound complications  Outcome: Progressing Towards Goal  Goal: *Anxiety reduced or absent  Outcome: Progressing Towards Goal  Goal: *Verbalizes and demonstrates incision care  Outcome: Progressing Towards Goal  Goal: *Understands and describes signs and symptoms to report to providers(Stroke Metric)  Outcome: Progressing Towards Goal  Goal: *Describes follow-up/return visits to physicians  Outcome: Progressing Towards Goal  Goal: *Describes available resources and support systems  Outcome: Progressing Towards Goal  Goal: *Influenza immunization  Outcome: Progressing Towards Goal  Goal: *Pneumococcal immunization  Outcome: Progressing Towards Goal

## 2020-07-30 NOTE — DISCHARGE SUMMARY
Cardiology Discharge Summary     Patient ID:  Carol Ann Pimentel  [id number]  69 y.o.  1945    Admit Date: 7/29/2020    Discharge Date: 07/30/20    Admitting Physician: Lisbeth Anguiano MD     Discharge Physician: Lisbeth Anguiano M.D. Admission Diagnoses:   Unstable angina pectoris due to coronary arteriosclerosis ([address] [I25.110]  Status post cardiac catheterization [Z98.890]  CAD (coronary artery disease) [I25.10]    Discharge Diagnoses: Active Problems:    Status post cardiac catheterization (7/29/2020)      CAD (coronary artery disease) (7/29/2020)        Discharge Condition: Good    Cardiology Procedures this Admission:  Left heart catheterization with PCI    Consults: GI and Hematology/Oncology    Hospital Course:   Admitted to telemetry following PCI.  07/29/20   CARDIAC PROCEDURE 07/29/2020 7/29/2020    Narrative Findings:  1. Severe native one-vessel coronary artery disease involving in-stent   restenosis in proximal and midportion of right coronary artery stent. On   AN imaging, proximal right coronary artery stent segment is   underexpanded mid eccentric dilatation and calcification on the exterior   aspect of underexpanded/underexpanded segment. There is another mildly   underexpanded segment in the mid RCA, which opened very well with   high-pressure balloon dilatation. 2.  Mild disease in left coronary circulation  3. IVUS guided intervention on proximal to mid RCA stent. Mid RCA stent   segment expanded valve with high-pressure balloon dilatation and was   treated with placement of new 3.5 x 12 mm Xience drug-eluting stent within   a stent segment at 20 sayda. Proximal portion of the stent had understand   banded segment which he did not adequately dilate with high-pressure   balloon(4 mm balloon at 24 sayda as well as cutting balloon). Hence it was   decided not to place another stent in the segment.   Should the patient   develop future recurrent symptoms potential consideration can be made to   use laser to dilate the stent further. However given that minimal luminal   dilation at this level are 2 mm, I am uncertain if laser will provide   enough modification of heart rate outside the stent. Access:  Right common femoral artery    Contrast: Intervention 30 cc    Recommendations  1. Plavix and aspirin based dual antiplatelet therapy  2. Guideline directed medical therapy for secondary prevention of   coronary artery disease     Signed by: Sylwia Gage MD     Ultimately received 3L IVF for low LVEDP. Felt better following procedures. GI and Heme/onc consulted for h/o GIB and anemia. Patient now on Plavix and ASA for JOEL placed. Hgb lowered following procedure - but 2/2 dilution from IVF. H/H recheck prior to discharge. Stable for discharge. Visit Vitals  /50 (BP 1 Location: Right arm, BP Patient Position: At rest)   Pulse 68   Temp 98 °F (36.7 °C)   Resp 18   Ht 6' 1\" (1.854 m)   Wt 175 lb 7.8 oz (79.6 kg)   SpO2 100%   BMI 23.15 kg/m²       Physical Exam  Abdomen: soft, non-tender. Bowel sounds normal.   Extremities: no LE edema, + PP bilaterally   Heart: regular rate and rhythm, S1, S2 normal, no murmurs, clicks, rubs or gallops  Lungs: clear to auscultation bilaterally  Neck: supple, symmetrical, trachea midline  Neurologic: Grossly normal  Pulses: 2+ and symmetrical    Labs:   Recent Labs     07/30/20  0157   WBC 8.2   HGB 8.2*   HCT 25.6*        Recent Labs     07/30/20  0157      K 3.6      CO2 24   *   BUN 23*   CREA 0.47*   CA 8.2*       No results for input(s): TROIQ, CPK, CKMB in the last 72 hours. Disposition: home    Patient Instructions:   Current Discharge Medication List      START taking these medications    Details   clopidogreL (PLAVIX) 75 mg tab Take 1 Tab by mouth daily.   Qty: 30 Tab, Refills: 3         CONTINUE these medications which have CHANGED    Details   rosuvastatin (CRESTOR) 20 mg tablet Take 1 Tab by mouth nightly. Qty: 30 Tab, Refills: 1         CONTINUE these medications which have NOT CHANGED    Details   lisinopriL (PRINIVIL, ZESTRIL) 20 mg tablet TAKE 1 TABLET BY MOUTH EVERY DAY  Qty: 90 Tab, Refills: 1      nitroglycerin (NITROSTAT) 0.4 mg SL tablet 1 Tab by SubLINGual route every five (5) minutes as needed for Chest Pain. Do not exceed 3 doses in 24 hours. Qty: 1 Bottle, Refills: 3    Associated Diagnoses: Coronary artery disease involving native coronary artery of native heart without angina pectoris      ferrous sulfate 325 mg (65 mg iron) tablet Take  by mouth two (2) times a day. cholecalciferol (VITAMIN D3) 1,000 unit cap Take 1,000 Units by mouth daily. aspirin 81 mg chewable tablet Take 1 Tab by mouth daily. Qty: 30 Tab, Refills: 1      Omega-3 Fatty Acids 300 mg cap Take 1 Tab by mouth daily. GLIPIZIDE PO Take 5 mg by mouth three (3) times daily. metFORMIN (GLUCOPHAGE) 500 mg tablet Take 500 mg by mouth four (4) times daily (with meals). sucralfate (CARAFATE) 100 mg/mL suspension Take 1 g by mouth four (4) times daily. Reference discharge instructions provided by nursing for diet and activity.     Follow-up with   Future Appointments   Date Time Provider Shavon Avila   9/1/2020  8:40 AM MD LORIE Pena AMB       Signed:  NATALIE Lynch MD

## 2020-07-30 NOTE — PROGRESS NOTES
Cardiology Progress Note           7/29/2020  1:10 PM   Unstable angina pectoris due to coronary arteriosclerosis ([address] [I25.110]  Status post cardiac catheterization [Z98.890]  CAD (coronary artery disease) [I25.10]     HPI: Wayne Rayo is a 69 y.o. male admitted for Unstable angina pectoris due to coronary arteriosclerosis ([address] [I25.110]  Status post cardiac catheterization [Z98.890]  CAD (coronary artery disease) [I25.10]. Investigation  Telemetry: normal sinus rhythm  Echocardiogram:  none     Assessment and PLAN     1. [place]   - s/p Cardiac cath  07/29/20   CARDIAC PROCEDURE 07/29/2020 7/29/2020    Narrative Findings:  1. Severe native one-vessel coronary artery disease involving in-stent   restenosis in proximal and midportion of right coronary artery stent. On   AN imaging, proximal right coronary artery stent segment is   underexpanded mid eccentric dilatation and calcification on the exterior   aspect of underexpanded/underexpanded segment. There is another mildly   underexpanded segment in the mid RCA, which opened very well with   high-pressure balloon dilatation. 2.  Mild disease in left coronary circulation  3. IVUS guided intervention on proximal to mid RCA stent. Mid RCA stent   segment expanded valve with high-pressure balloon dilatation and was   treated with placement of new 3.5 x 12 mm Xience drug-eluting stent within   a stent segment at 20 sayda. Proximal portion of the stent had understand   banded segment which he did not adequately dilate with high-pressure   balloon(4 mm balloon at 24 sayda as well as cutting balloon). Hence it was   decided not to place another stent in the segment. Should the patient   develop future recurrent symptoms potential consideration can be made to   use laser to dilate the stent further.   However given that minimal luminal   dilation at this level are 2 mm, I am uncertain if laser will provide   enough modification of heart rate outside the stent. Access:  Right common femoral artery    Contrast: Intervention 30 cc    Recommendations  1. Plavix and aspirin based dual antiplatelet therapy  2. Guideline directed medical therapy for secondary prevention of   coronary artery disease     Signed by: Mu Thomas MD    - DAPT with Plavix and ASA   - H/o bleeding/GI hemorrhage with Plavix use. 2. CAD   - Stent to RCA   - stents as above   - DAPT, Crestor  3. HTN   - Continues Lisinopril 20 mg  4. HLD  Cholesterol, total 128 07/20/2020 09:41 AM   HDL Cholesterol 60 07/20/2020 09:41 AM   LDL, calculated 56 07/20/2020 09:41 AM   VLDL, calculated 12 07/20/2020 09:41 AM   Triglyceride 59 07/20/2020 09:41 AM    - Crestor 20 mg daily  5. Fe deficiency anemia   - associated chronic blood loss   - H/o GIB with Plavix use in past   - GI to see -given potential risk and current anemia   - Heme to see for anemia (f/u with Dr Yelitza Langley)    - Hgb 11.8 yesterday. 8.2 today. Received 2-3 liters IVF yesterday. Suspect this is dilutional.    Feeling better today. No c/o CP or SOB. GI and Heme to see as above. Planning for discharge later today after consultants to see. Continues on DAPT for stent placement. Will resume all meds on discharge. []    High complexity decision making was performed  []    Patient is at high-risk of decompensation with multiple organ involvement     Review of Symptoms:  Respiratory: No exertional dyspnea, orthopnea, PND, cough, hemoptysis, URI. Cardiovascular: No CP, palpitations, sweating, lightheadedness, dizziness, syncope, presyncope, lower extremity swelling. Otherwise no other pertinent positive or negative symptoms on ROS.      Patient Active Problem List    Diagnosis Date Noted    Status post cardiac catheterization 07/29/2020     Priority: 1 - One    CAD (coronary artery disease) 07/29/2020     Priority: 1 - One    S/P drug eluting coronary stent placement 03/25/2018    S/p bare metal coronary artery stent 12/06/2017    Hypertension, essential 12/06/2017    Diabetes ([facility] 11/03/2017    Chest pain 11/03/2017    CAD in native artery 11/03/2017    Family history of coronary arteriosclerosis     Hemangioma     Cataract     Anemia     PUD (peptic ulcer disease) 01/01/1977      Andrew Cevallos MD  Past Medical History:   Diagnosis Date    Anemia     Dr Clover Lock found ooze at site of Bilroth anastomosis, was on sucralfate, saw Dr Claudean Pintos CAD in native artery 11/03/2017    admit [place], RCA 99% mid, PLB prox 90% collaterals from [address] Diabetes ([facility] [address] 11/03/2017    on metformin    Family history of coronary arteriosclerosis     Hemangioma     splenectomy-Mccormick    Hypertension, essential 12/6/2017    NSTEMI (non-ST elevated myocardial infarction) ([facility] 11/03/2017    troponin 0.67    PUD (peptic ulcer disease) 1977    Bilroth 2    S/p bare metal coronary artery stent 12/6/2017    S/P drug eluting coronary stent placement 3/25/2018    Statin intolerance 11/30/2017    declined Lipitor post BMS      Past Surgical History:   Procedure Laterality Date    HX OTHER SURGICAL      splenectomy     Social History     Socioeconomic History    Marital status:      Spouse name: Not on file    Number of children: Not on file    Years of education: Not on file    Highest education level: Not on file   Occupational History    Occupation:  [employer]   Tobacco Use    Smoking status: Never Smoker    Smokeless tobacco: Never Used   Substance and Sexual Activity    Alcohol use: No    Drug use: No     Family History   Problem Relation Age of Onset    Heart Attack Father 79      Current Facility-Administered Medications   Medication Dose Route Frequency    acetaminophen (TYLENOL) tablet 650 mg  650 mg Oral Q4H PRN    aspirin chewable tablet 81 mg  81 mg Oral DAILY    lisinopriL (PRINIVIL, ZESTRIL) tablet 20 mg  20 mg Oral DAILY    insulin lispro (HUMALOG) injection   SubCUTAneous AC&HS    glucose chewable tablet 16 g  4 Tab Oral PRN    glucagon (GLUCAGEN) injection 1 mg  1 mg IntraMUSCular PRN    dextrose 10% infusion 0-250 mL  0-250 mL IntraVENous PRN    sodium chloride (NS) flush 5-40 mL  5-40 mL IntraVENous Q8H    sodium chloride (NS) flush 5-40 mL  5-40 mL IntraVENous PRN    clopidogreL (PLAVIX) tablet 75 mg  75 mg Oral DAILY    rosuvastatin (CRESTOR) tablet 20 mg  20 mg Oral QHS    sucralfate (CARAFATE) tablet 1 g  1 g Oral AC&HS      Prior to Admission Medications   Prescriptions Last Dose Informant Patient Reported? Taking? GLIPIZIDE PO 2020 at 600  Yes Yes   Sig: Take 5 mg by mouth three (3) times daily. Omega-3 Fatty Acids 300 mg cap 2020 at 600  Yes Yes   Sig: Take 1 Tab by mouth daily. aspirin 81 mg chewable tablet 2020 at 600  Yes Yes   Sig: Take 1 Tab by mouth daily. cholecalciferol (VITAMIN D3) 1,000 unit cap 2020 at 600  Yes Yes   Sig: Take 1,000 Units by mouth daily. ferrous sulfate 325 mg (65 mg iron) tablet 2020 at 600  Yes Yes   Sig: Take  by mouth two (2) times a day. lisinopriL (PRINIVIL, ZESTRIL) 20 mg tablet 2020 at 600  No Yes   Sig: TAKE 1 TABLET BY MOUTH EVERY DAY   metFORMIN (GLUCOPHAGE) 500 mg tablet 2020 at 1800  Yes No   Sig: Take 500 mg by mouth four (4) times daily (with meals). nitroglycerin (NITROSTAT) 0.4 mg SL tablet 2020 at 730  No Yes   Si Tab by SubLINGual route every five (5) minutes as needed for Chest Pain. Do not exceed 3 doses in 24 hours. rosuvastatin (CRESTOR) 10 mg tablet 2020 at 2200  Yes Yes   Sig: Take 10 mg by mouth nightly. sucralfate (CARAFATE) 100 mg/mL suspension 2020 at 2200  Yes No   Sig: Take 1 g by mouth four (4) times daily.       Facility-Administered Medications: None      Allergies   Allergen Reactions    Sulfa (Sulfonamide Antibiotics) Hives       Labs:   Recent Results (from the past 24 hour(s))   GLUCOSE, POC    Collection Time: 07/29/20  2:22 PM   Result Value Ref Range    Glucose (POC) 158 (H) 65 - 100 mg/dL    Performed by Reshma Lee    POC ACTIVATED CLOTTING TIME    Collection Time: 07/29/20  3:37 PM   Result Value Ref Range    Activated Clotting Time (POC) 142 (H) 79 - 138 SECS   POC ACTIVATED CLOTTING TIME    Collection Time: 07/29/20  3:43 PM   Result Value Ref Range    Activated Clotting Time (POC) 137 79 - 138 SECS   POC ACTIVATED CLOTTING TIME    Collection Time: 07/29/20  3:59 PM   Result Value Ref Range    Activated Clotting Time (POC) 312 (H) 79 - 138 SECS   POC ACTIVATED CLOTTING TIME    Collection Time: 07/29/20  6:34 PM   Result Value Ref Range    Activated Clotting Time (POC) 142 (H) 79 - 138 SECS   ECG RHYTHM ANALYSIS ADULT    Collection Time: 07/29/20  7:02 PM   Result Value Ref Range    Ventricular Rate 67 BPM    Atrial Rate 67 BPM    P-R Interval 232 ms    QRS Duration 80 ms    Q-T Interval 408 ms    QTC Calculation (Bezet) 431 ms    Calculated P Axis 53 degrees    Calculated R Axis 21 degrees    Calculated T Axis 63 degrees    Diagnosis       Sinus rhythm with 1st degree AV block  Low voltage QRS  When compared with ECG of 02-FEB-2018 12:34,  TX interval has increased  Confirmed by Elbert Lauren MD, [employer] ([id number]) on 7/30/2020 8:09:12 AM     GLUCOSE, POC    Collection Time: 07/29/20  7:36 PM   Result Value Ref Range    Glucose (POC) 142 (H) 65 - 100 mg/dL    Performed by Reshma Lee    GLUCOSE, POC    Collection Time: 07/29/20  9:09 PM   Result Value Ref Range    Glucose (POC) 128 (H) 65 - 100 mg/dL    Performed by Sharda Blue    METABOLIC PANEL, BASIC    Collection Time: 07/30/20  1:57 AM   Result Value Ref Range    Sodium 139 136 - 145 mmol/L    Potassium 3.6 3.5 - 5.1 mmol/L    Chloride 108 97 - 108 mmol/L    CO2 24 21 - 32 mmol/L    Anion gap 7 5 - 15 mmol/L    Glucose 183 (H) 65 - 100 mg/dL    BUN 23 (H) 6 - 20 MG/DL    Creatinine 0.47 (L) 0.70 - 1.30 MG/DL    BUN/Creatinine ratio 49 (H) 12 - 20 GFR est AA >60 >60 ml/min/1.73m2    GFR est non-AA >60 >60 ml/min/1.73m2    Calcium 8.2 (L) 8.5 - 10.1 MG/DL   CBC W/O DIFF    Collection Time: 07/30/20  1:57 AM   Result Value Ref Range    WBC 8.2 4.1 - 11.1 K/uL    RBC 2.86 (L) 4.10 - 5.70 M/uL    HGB 8.2 (L) 12.1 - 17.0 g/dL    HCT 25.6 (L) 36.6 - 50.3 %    MCV 89.5 80.0 - 99.0 FL    MCH 28.7 26.0 - 34.0 PG    MCHC 32.0 30.0 - 36.5 g/dL    RDW 14.2 11.5 - 14.5 %    PLATELET 418 757 - 931 K/uL    MPV 10.9 8.9 - 12.9 FL    NRBC 0.0 0  WBC    ABSOLUTE NRBC 0.00 0.00 - 0.01 K/uL   GLUCOSE, POC    Collection Time: 07/30/20  6:32 AM   Result Value Ref Range    Glucose (POC) 138 (H) 65 - 100 mg/dL    Performed by Marta Valdovinos        Intake/Output Summary (Last 24 hours) at 7/30/2020 0941  Last data filed at 7/30/2020 0330  Gross per 24 hour   Intake 220 ml   Output 250 ml   Net -30 ml      Patient Vitals for the past 24 hrs:   Temp Pulse Resp BP SpO2   07/30/20 0747 98.1 °F (36.7 °C) 62 18 114/63 97 %   07/30/20 0321 98.3 °F (36.8 °C) 71 15 110/41 98 %   07/30/20 0130 -- 74 -- 105/46 97 %   07/30/20 0030 -- 70 -- 101/49 97 %   07/29/20 2330 -- 69 -- 111/48 98 %   07/29/20 2318 98.4 °F (36.9 °C) 72 14 102/41 97 %   07/29/20 2200 -- 74 -- 120/47 100 %   07/29/20 2130 -- 79 -- 146/62 100 %   07/29/20 2115 -- 74 -- 136/60 100 %   07/29/20 2100 -- 82 -- 141/60 100 %   07/29/20 2040 97.8 °F (36.6 °C) 67 16 116/55 100 %   07/29/20 2015 97.7 °F (36.5 °C) 73 18 138/63 100 %   07/29/20 2000 -- 65 16 104/58 100 %   07/29/20 1945 -- 65 16 110/45 100 %   07/29/20 1930 -- 68 16 116/53 100 %   07/29/20 1915 -- 66 16 115/52 100 %   07/29/20 1900 -- 68 16 113/59 100 %   07/29/20 1855 -- 68 16 112/56 100 %   07/29/20 1850 -- 70 16 118/61 100 %   07/29/20 1845 -- 69 16 107/55 100 %   07/29/20 1840 -- 65 16 106/56 100 %   07/29/20 1830 -- 66 16 111/54 100 %   07/29/20 1815 -- 64 16 104/55 100 %   07/29/20 1800 -- 66 16 111/55 100 %   07/29/20 1745 -- 64 16 105/54 100 %   07/29/20 1730 -- 61 16 108/53 100 %   07/29/20 1715 -- 68 16 123/66 100 %   07/29/20 1700 -- 68 16 144/82 100 %   07/29/20 1645 -- 75 16 128/69 100 %   07/29/20 1630 98.4 °F (36.9 °C) 69 16 149/75 100 %   07/29/20 1623 -- 73 14 131/63 99 %   07/29/20 1508 -- 63 14 98/57 100 %   07/29/20 1345 97.6 °F (36.4 °C) 65 20 105/52 100 %      General:    Alert, cooperative, no distress. Psychiatric:    Normal Mood and affect    Eye/ENT:      Pupils equal, No asymmetry, Conjunctival pink. Able to hear voice at normal amplitude   Lungs:      Visibly symmetric chest expansion, No palpable tenderness. Clear to auscultation     bilaterally. Heart[de-identified]    Regular rate and rhythm, S1, S2 normal, no murmur, click, rub or gallop. No JVD, Normal    palpable peripheral pulses. No cyanosis   Abdomen:     Soft, non-tender. Bowel sounds normal. No masses,  No      organomegaly. Extremities:   Extremities normal, atraumatic, no edema. Neurologic:   CN II-XII grossly intact.  No gross focal deficits         NATALIE Malik MD    7/30/2020   9:41 AM     [facility] Office:   [address] [facility] Office:  [address]    [place] [address] [facility]  [address]     [address] [place] [address], [address] [place] [address] Nw  P: [phone]    P: [phone]  F: [phone]    F: [phone]

## 2020-07-30 NOTE — PROGRESS NOTES
1930  TRANSFER - IN REPORT:    Verbal report received from Bj Lorenzo on [address]  being received from Cath Lab for routine post - op      Report consisted of patients Situation, Background, Assessment and   Recommendations(SBAR). Information from the following report(s) SBAR, Kardex, Intake/Output, MAR, Accordion, Recent Results and Cardiac Rhythm NSR was reviewed with the receiving nurse. Opportunity for questions and clarification was provided. Assessment completed upon patients arrival to unit and care assumed. 0730  Bedside shift change report given to [address] [place] [address],[address] by [employer]. Report included the following information SBAR, Kardex, Intake/Output, MAR, Accordion, Recent Results and Cardiac Rhythm NSR.

## 2020-07-30 NOTE — CARDIO/PULMONARY
Cardiac Rehab: CAD education folder given to Alina Barber. Educated using teach back method. Reviewed CAD diagnosis definition and purpose of intervention. This is not his first stent. Discussed diabetes as a risk factor. He is a NON-smoker. Discussed Heart Healthy/Low Sodium (2000 mg) diet. Emphasized the value of cardiac rehab. Discussed Cardiac Rehab Program format, benefits, and encouraged enrollment to assist with risk modification and management. Patient reports he walks anywhere from 3-9 miles a day and that his wife is a \"doctor and I do what she tells me to do\". He declined cardiac rehab and [facility] contact information is on the AVS with instructions to call should he change his mind about participating. Alina Barber verbalized understanding with questions answered.  Alex Mckinney RN

## 2020-08-03 ENCOUNTER — TELEPHONE (OUTPATIENT)
Dept: CARDIOLOGY CLINIC | Age: 75
End: 2020-08-03

## 2020-08-03 ENCOUNTER — DOCUMENTATION ONLY (OUTPATIENT)
Dept: CARDIOLOGY CLINIC | Age: 75
End: 2020-08-03

## 2020-08-03 DIAGNOSIS — D50.0 IRON DEFICIENCY ANEMIA DUE TO CHRONIC BLOOD LOSS: Primary | ICD-10-CM

## 2020-08-03 NOTE — TELEPHONE ENCOUNTER
8/3/2020 at 1:36 left message on patients home voicemail, please call to schedule virtual visit with Lisbeth Tari on 8/7 at 9:00 or 9:30 per the request of Armani Byrnes for hospital follow up post cath. Patient will need to be given instructions on how virtual visit works.

## 2020-08-03 NOTE — TELEPHONE ENCOUNTER
Verified patient with two types of identifiers. Spoke to patient and wife, he will get his blood work completed tomorrow to recheck his Hgb. Patient verbalized understanding and will call with any other questions.

## 2020-08-04 LAB
HCT VFR BLD AUTO: 29.9 % (ref 37.5–51)
HGB BLD-MCNC: 9.8 G/DL (ref 13–17.7)

## 2020-08-07 ENCOUNTER — OFFICE VISIT (OUTPATIENT)
Dept: CARDIOLOGY CLINIC | Age: 75
End: 2020-08-07
Payer: MEDICARE

## 2020-08-07 VITALS
DIASTOLIC BLOOD PRESSURE: 70 MMHG | SYSTOLIC BLOOD PRESSURE: 130 MMHG | WEIGHT: 180.2 LBS | RESPIRATION RATE: 16 BRPM | HEART RATE: 73 BPM | OXYGEN SATURATION: 95 % | HEIGHT: 73 IN | BODY MASS INDEX: 23.88 KG/M2

## 2020-08-07 DIAGNOSIS — R07.9 CHEST PAIN, UNSPECIFIED TYPE: ICD-10-CM

## 2020-08-07 DIAGNOSIS — D50.0 IRON DEFICIENCY ANEMIA DUE TO CHRONIC BLOOD LOSS: ICD-10-CM

## 2020-08-07 DIAGNOSIS — Z95.5 S/P DRUG ELUTING CORONARY STENT PLACEMENT: ICD-10-CM

## 2020-08-07 DIAGNOSIS — I25.110 CORONARY ARTERY DISEASE INVOLVING NATIVE CORONARY ARTERY OF NATIVE HEART WITH UNSTABLE ANGINA PECTORIS (HCC): ICD-10-CM

## 2020-08-07 DIAGNOSIS — I10 HYPERTENSION, ESSENTIAL: ICD-10-CM

## 2020-08-07 DIAGNOSIS — I25.118 CORONARY ARTERY DISEASE OF NATIVE ARTERY OF NATIVE HEART WITH STABLE ANGINA PECTORIS (HCC): Primary | ICD-10-CM

## 2020-08-07 DIAGNOSIS — Z78.9 STATIN INTOLERANCE: ICD-10-CM

## 2020-08-07 PROCEDURE — G8752 SYS BP LESS 140: HCPCS | Performed by: SPECIALIST

## 2020-08-07 PROCEDURE — G8754 DIAS BP LESS 90: HCPCS | Performed by: SPECIALIST

## 2020-08-07 PROCEDURE — G8427 DOCREV CUR MEDS BY ELIG CLIN: HCPCS | Performed by: SPECIALIST

## 2020-08-07 PROCEDURE — 99215 OFFICE O/P EST HI 40 MIN: CPT | Performed by: SPECIALIST

## 2020-08-07 PROCEDURE — G8420 CALC BMI NORM PARAMETERS: HCPCS | Performed by: SPECIALIST

## 2020-08-07 PROCEDURE — G8536 NO DOC ELDER MAL SCRN: HCPCS | Performed by: SPECIALIST

## 2020-08-07 PROCEDURE — G8432 DEP SCR NOT DOC, RNG: HCPCS | Performed by: SPECIALIST

## 2020-08-07 PROCEDURE — 1101F PT FALLS ASSESS-DOCD LE1/YR: CPT | Performed by: SPECIALIST

## 2020-08-07 PROCEDURE — 3017F COLORECTAL CA SCREEN DOC REV: CPT | Performed by: SPECIALIST

## 2020-08-07 RX ORDER — METOPROLOL SUCCINATE 25 MG/1
25 TABLET, EXTENDED RELEASE ORAL DAILY
Qty: 30 TAB | Refills: 5 | Status: SHIPPED | OUTPATIENT
Start: 2020-08-07 | End: 2020-08-11

## 2020-08-07 NOTE — PROGRESS NOTES
HISTORY OF PRESENT ILLNESS  Ayla Tolbert is a 69 y.o. male. He has coronary disease and underwent a procedure about a week ago where he had a new drug-eluting stent placed in his right coronary artery. He also had a high-pressure dilatation of a previous stent in the same vessel that was underexpanded. He previously had exertional angina and had to take nitroglycerin after walking about 5 minutes. However he now claims to be worse and can walk only 100 yards without stopping due to chest pain radiating to his neck. Nitroglycerin does not help as much. He and his wife are originally from [facility] and then [facility] and she has a physician and does much of the talking for him and his symptoms. He apparently had surgery 40 years ago for bleeding duodenal ulcers and had recurrent bleeding 20 years ago. His hemoglobin was 11.7 on July 21 and fell to 8.2 on July 30 the day after the procedure. He has known iron deficiency and is currently taking 3 iron pills a day. Another hemoglobin done 4 days ago was up to 9.8. He does not want to undergo another heart catheterization or even a stress test if possible. He does mention that someone told him he might need bypass surgery. I reviewed his recent catheterization films in the office today. HPI  Patient Active Problem List   Diagnosis Code    PUD (peptic ulcer disease) K27.9    Family history of coronary arteriosclerosis Z82.49    [facility] E11.9    Hemangioma D18.00    Cataract H26.9    Anemia D64.9    Chest pain R07.9    S/p bare metal coronary artery stent Z95.5    CAD in native artery I25.10    Hypertension, essential I10    S/P drug eluting coronary stent placement Z95.5    Status post cardiac catheterization Z98.890    CAD (coronary artery disease) I25.10     Current Outpatient Medications   Medication Sig Dispense Refill    metoprolol succinate (TOPROL-XL) 25 mg XL tablet Take 1 Tab by mouth daily.  30 Tab 5    rosuvastatin (CRESTOR) 20 mg tablet Take 1 Tab by mouth nightly. 30 Tab 1    clopidogreL (PLAVIX) 75 mg tab Take 1 Tab by mouth daily. 30 Tab 3    lisinopriL (PRINIVIL, ZESTRIL) 20 mg tablet TAKE 1 TABLET BY MOUTH EVERY DAY 90 Tab 1    nitroglycerin (NITROSTAT) 0.4 mg SL tablet 1 Tab by SubLINGual route every five (5) minutes as needed for Chest Pain. Do not exceed 3 doses in 24 hours. 1 Bottle 3    ferrous sulfate 325 mg (65 mg iron) tablet Take  by mouth three (3) times daily (with meals).  cholecalciferol (VITAMIN D3) 1,000 unit cap Take 1,000 Units by mouth daily.  aspirin 81 mg chewable tablet Take 1 Tab by mouth daily. 30 Tab 1    Omega-3 Fatty Acids 300 mg cap Take 1 Tab by mouth daily.  metFORMIN (GLUCOPHAGE) 500 mg tablet Take 500 mg by mouth four (4) times daily (with meals).  GLIPIZIDE PO Take 5 mg by mouth three (3) times daily.  sucralfate (CARAFATE) 100 mg/mL suspension Take 1 g by mouth four (4) times daily. Past Medical History:   Diagnosis Date    Anemia     Dr Sonya Brown found ooze at site of Bilroth anastomosis, was on sucralfate, saw Dr Deepika Rhodes CAD in native artery 11/03/2017    admit [place], RCA 99% mid, PLB prox 90% collaterals from [address] Diabetes ([facility] 11/03/2017    on metformin    Family history of coronary arteriosclerosis     Hemangioma     splenectomy-Mccormick    Hypertension, essential 12/6/2017    NSTEMI (non-ST elevated myocardial infarction) ([facility] 11/03/2017    troponin 0.67    PUD (peptic ulcer disease) 1977    Bilroth 2    S/p bare metal coronary artery stent 12/6/2017    S/P drug eluting coronary stent placement 3/25/2018    Statin intolerance 11/30/2017    declined Lipitor post BMS     Past Surgical History:   Procedure Laterality Date    HX OTHER SURGICAL      splenectomy       Review of Systems   Cardiovascular: Positive for chest pain. All other systems reviewed and are negative.     Visit Vitals  /70 (BP 1 Location: Left arm, BP Patient Position: Sitting)   Pulse 73   Resp 16   Ht 6' 1\" (1.854 m)   Wt 180 lb 3.2 oz (81.7 kg)   SpO2 95%   BMI 23.77 kg/m²       Physical Exam   Constitutional: He is oriented to person, place, and time. He appears well-nourished. HENT:   Head: Atraumatic. Eyes: Conjunctivae are normal.   Neck: Neck supple. Cardiovascular: Normal rate, regular rhythm and normal heart sounds. Exam reveals no gallop and no friction rub. No murmur heard. Pulmonary/Chest: Breath sounds normal. He has no wheezes. He has no rales. Abdominal: Bowel sounds are normal.   Musculoskeletal:         General: No edema. Neurological: He is oriented to person, place, and time. Skin: Skin is dry. Psychiatric: His behavior is normal.   Nursing note and vitals reviewed. ASSESSMENT and PLAN  It seems that his symptoms have worsened since his procedure. I reviewed his films and I felt that the result in the right coronary artery was quite good. There did seem to be disease in the left-sided vessels however. Nevertheless I suspect that his recent worsening of symptoms is due to his dropping hemoglobin. This is a difficult situation since he is now on Plavix and aspirin for his stent. He may need to have a Lexiscan Myoview test but I am concerned that any test done in this setting could result in a false positive. For now I will have him stop his aspirin and his fish oil but continue the Plavix. He will have another CBC done today and we will call regarding the results and I will see him again next week in follow-up.

## 2020-08-08 LAB
ERYTHROCYTE [DISTWIDTH] IN BLOOD BY AUTOMATED COUNT: 14 % (ref 11.6–15.4)
HCT VFR BLD AUTO: 28.1 % (ref 37.5–51)
HGB BLD-MCNC: 9.3 G/DL (ref 13–17.7)
MCH RBC QN AUTO: 28.5 PG (ref 26.6–33)
MCHC RBC AUTO-ENTMCNC: 33.1 G/DL (ref 31.5–35.7)
MCV RBC AUTO: 86 FL (ref 79–97)
PLATELET # BLD AUTO: 438 X10E3/UL (ref 150–450)
RBC # BLD AUTO: 3.26 X10E6/UL (ref 4.14–5.8)
WBC # BLD AUTO: 7.8 X10E3/UL (ref 3.4–10.8)

## 2020-08-11 ENCOUNTER — OFFICE VISIT (OUTPATIENT)
Dept: CARDIOLOGY CLINIC | Age: 75
End: 2020-08-11
Payer: MEDICARE

## 2020-08-11 VITALS
OXYGEN SATURATION: 100 % | HEART RATE: 79 BPM | BODY MASS INDEX: 24.39 KG/M2 | DIASTOLIC BLOOD PRESSURE: 56 MMHG | SYSTOLIC BLOOD PRESSURE: 120 MMHG | HEIGHT: 73 IN | WEIGHT: 184 LBS

## 2020-08-11 DIAGNOSIS — D50.0 IRON DEFICIENCY ANEMIA DUE TO CHRONIC BLOOD LOSS: ICD-10-CM

## 2020-08-11 DIAGNOSIS — E11.65 TYPE 2 DIABETES MELLITUS WITH HYPERGLYCEMIA, WITHOUT LONG-TERM CURRENT USE OF INSULIN (HCC): ICD-10-CM

## 2020-08-11 DIAGNOSIS — R07.9 CHEST PAIN, UNSPECIFIED TYPE: ICD-10-CM

## 2020-08-11 DIAGNOSIS — K27.9 PUD (PEPTIC ULCER DISEASE): ICD-10-CM

## 2020-08-11 DIAGNOSIS — I25.110 CORONARY ARTERY DISEASE INVOLVING NATIVE CORONARY ARTERY OF NATIVE HEART WITH UNSTABLE ANGINA PECTORIS (HCC): Primary | ICD-10-CM

## 2020-08-11 DIAGNOSIS — Z78.9 STATIN INTOLERANCE: ICD-10-CM

## 2020-08-11 DIAGNOSIS — Z95.5 S/P DRUG ELUTING CORONARY STENT PLACEMENT: ICD-10-CM

## 2020-08-11 PROCEDURE — 3017F COLORECTAL CA SCREEN DOC REV: CPT | Performed by: SPECIALIST

## 2020-08-11 PROCEDURE — G8432 DEP SCR NOT DOC, RNG: HCPCS | Performed by: SPECIALIST

## 2020-08-11 PROCEDURE — G8427 DOCREV CUR MEDS BY ELIG CLIN: HCPCS | Performed by: SPECIALIST

## 2020-08-11 PROCEDURE — 3046F HEMOGLOBIN A1C LEVEL >9.0%: CPT | Performed by: SPECIALIST

## 2020-08-11 PROCEDURE — 2022F DILAT RTA XM EVC RTNOPTHY: CPT | Performed by: SPECIALIST

## 2020-08-11 PROCEDURE — 1101F PT FALLS ASSESS-DOCD LE1/YR: CPT | Performed by: SPECIALIST

## 2020-08-11 PROCEDURE — G8754 DIAS BP LESS 90: HCPCS | Performed by: SPECIALIST

## 2020-08-11 PROCEDURE — G8752 SYS BP LESS 140: HCPCS | Performed by: SPECIALIST

## 2020-08-11 PROCEDURE — G8536 NO DOC ELDER MAL SCRN: HCPCS | Performed by: SPECIALIST

## 2020-08-11 PROCEDURE — 99214 OFFICE O/P EST MOD 30 MIN: CPT | Performed by: SPECIALIST

## 2020-08-11 PROCEDURE — G8420 CALC BMI NORM PARAMETERS: HCPCS | Performed by: SPECIALIST

## 2020-08-11 RX ORDER — METOPROLOL SUCCINATE 50 MG/1
25 TABLET, EXTENDED RELEASE ORAL DAILY
Qty: 30 TAB | Refills: 5 | Status: SHIPPED | OUTPATIENT
Start: 2020-08-11 | End: 2021-07-19

## 2020-08-11 NOTE — PROGRESS NOTES
HISTORY OF PRESENT ILLNESS  Natividad Montaño is a 69 y.o. male. He recently had new stents placed in his right coronary artery. His exercise capacity has actually become much worse since the procedure but his hemoglobin has also fallen from 11.7 prior to the procedure down to as low as 9, more recently up to 9.3. He has a history of surgery for peptic ulcer disease 40 years ago with more complete evaluation for bleeding 20 years ago. He apparently has bleeding from the site of the anastomosis of his Billroth procedure. When I saw him last week he did not want to return to the Cath Lab. I continued him on Plavix but stopped his aspirin and fish oil. His hemoglobin was 9.3 at that time. He states that his stool is no longer black but is now becoming brown. Over the weekend he was able to walk 10,000 steps in his house without difficulty. I also started him on low-dose of a beta-blocker. Last night he had to take some nitroglycerin however. HPI  Patient Active Problem List   Diagnosis Code    PUD (peptic ulcer disease) K27.9    Family history of coronary arteriosclerosis Z82.49    Diabetes ([facility] E11.9    Hemangioma D18.00    Cataract H26.9    Anemia D64.9    Chest pain R07.9    S/p bare metal coronary artery stent Z95.5    CAD in native artery I25.10    Hypertension, essential I10    S/P drug eluting coronary stent placement Z95.5    Status post cardiac catheterization Z98.890    CAD (coronary artery disease) I25.10     Current Outpatient Medications   Medication Sig Dispense Refill    metoprolol succinate (TOPROL-XL) 50 mg XL tablet Take 0.5 Tabs by mouth daily. 30 Tab 5    rosuvastatin (CRESTOR) 20 mg tablet Take 1 Tab by mouth nightly. 30 Tab 1    clopidogreL (PLAVIX) 75 mg tab Take 1 Tab by mouth daily.  30 Tab 3    lisinopriL (PRINIVIL, ZESTRIL) 20 mg tablet TAKE 1 TABLET BY MOUTH EVERY DAY 90 Tab 1    nitroglycerin (NITROSTAT) 0.4 mg SL tablet 1 Tab by SubLINGual route every five (5) minutes as needed for Chest Pain. Do not exceed 3 doses in 24 hours. 1 Bottle 3    ferrous sulfate 325 mg (65 mg iron) tablet Take  by mouth three (3) times daily (with meals).  cholecalciferol (VITAMIN D3) 1,000 unit cap Take 1,000 Units by mouth daily.  GLIPIZIDE PO Take 5 mg by mouth three (3) times daily.  sucralfate (CARAFATE) 100 mg/mL suspension Take 1 g by mouth four (4) times daily.  aspirin 81 mg chewable tablet Take 1 Tab by mouth daily. 30 Tab 1    Omega-3 Fatty Acids 300 mg cap Take 1 Tab by mouth daily.  metFORMIN (GLUCOPHAGE) 500 mg tablet Take 500 mg by mouth four (4) times daily (with meals). Past Medical History:   Diagnosis Date    Anemia     Dr Annie Clements found ooze at site of Bilroth anastomosis, was on sucralfate, saw Dr Carmen Villatoro CAD in native artery 11/03/2017    admit [place], RCA 99% mid, PLB prox 90% collaterals from [address] Diabetes ([facility] 11/03/2017    on metformin    Family history of coronary arteriosclerosis     Hemangioma     splenectomy-Mccormick    Hypertension, essential 12/6/2017    NSTEMI (non-ST elevated myocardial infarction) ([facility] 11/03/2017    troponin 0.67    PUD (peptic ulcer disease) 1977    Bilroth 2    S/p bare metal coronary artery stent 12/6/2017    S/P drug eluting coronary stent placement 3/25/2018    Statin intolerance 11/30/2017    declined Lipitor post BMS     Past Surgical History:   Procedure Laterality Date    HX OTHER SURGICAL      splenectomy       Review of Systems   Respiratory: Positive for shortness of breath. Cardiovascular: Positive for chest pain. Gastrointestinal: Positive for melena. All other systems reviewed and are negative. Visit Vitals  /56   Pulse 79   Ht 6' 1\" (1.854 m)   Wt 184 lb (83.5 kg)   SpO2 100%   BMI 24.28 kg/m²       Physical Exam   Constitutional: He is oriented to person, place, and time. He appears well-nourished. HENT:   Head: Atraumatic.    Eyes: Conjunctivae are normal.   Neck: Neck supple. Cardiovascular: Normal rate, regular rhythm and normal heart sounds. Exam reveals no gallop and no friction rub. No murmur heard. Pulmonary/Chest: Breath sounds normal. He has no wheezes. Abdominal: Bowel sounds are normal.   Musculoskeletal:         General: No edema. Neurological: He is oriented to person, place, and time. Skin: Skin is dry. Psychiatric: His behavior is normal.   Nursing note and vitals reviewed. ASSESSMENT and PLAN  Overall he seems to be doing better. I strongly suspect after reviewing his films from his last procedure that his symptoms are primarily due to his anemia. He still does not want to have another catheterization if possible. Therefore I will check his hemoglobin again today and increase his beta-blocker to 50 mg a day and schedule follow-up appointment for about 2 weeks time. Of course if he becomes worse and he may need a repeat catheterization.

## 2020-08-12 ENCOUNTER — TELEPHONE (OUTPATIENT)
Dept: CARDIOLOGY CLINIC | Age: 75
End: 2020-08-12

## 2020-08-12 LAB
HCT VFR BLD AUTO: 29.4 % (ref 37.5–51)
HGB BLD-MCNC: 9.1 G/DL (ref 13–17.7)

## 2020-08-12 NOTE — TELEPHONE ENCOUNTER
----- Message from Alexis Hendrix MD sent at 8/12/2020  2:16 PM EDT -----  Hematocrit slightly higher  ----- Message -----  From: Sondra Treviño Lab Results In  Sent: 8/12/2020  12:21 PM EDT  To: Alexis Hendrix MD

## 2020-08-13 NOTE — TELEPHONE ENCOUNTER
Called patient. Verified patient's identity with two identifiers. Spoke to him and his wife. Notified them of results. They also use [employer] and will look up results. They denied further questions or concerns.

## 2020-08-24 ENCOUNTER — OFFICE VISIT (OUTPATIENT)
Dept: CARDIOLOGY CLINIC | Age: 75
End: 2020-08-24
Payer: MEDICARE

## 2020-08-24 VITALS
RESPIRATION RATE: 15 BRPM | BODY MASS INDEX: 24.25 KG/M2 | SYSTOLIC BLOOD PRESSURE: 130 MMHG | HEART RATE: 57 BPM | WEIGHT: 183 LBS | DIASTOLIC BLOOD PRESSURE: 62 MMHG | HEIGHT: 73 IN | OXYGEN SATURATION: 90 %

## 2020-08-24 DIAGNOSIS — D50.0 IRON DEFICIENCY ANEMIA DUE TO CHRONIC BLOOD LOSS: ICD-10-CM

## 2020-08-24 DIAGNOSIS — I25.10 CAD IN NATIVE ARTERY: ICD-10-CM

## 2020-08-24 DIAGNOSIS — I25.10 CAD IN NATIVE ARTERY: Primary | ICD-10-CM

## 2020-08-24 DIAGNOSIS — E11.65 TYPE 2 DIABETES MELLITUS WITH HYPERGLYCEMIA, WITHOUT LONG-TERM CURRENT USE OF INSULIN (HCC): ICD-10-CM

## 2020-08-24 DIAGNOSIS — R07.9 CHEST PAIN, UNSPECIFIED TYPE: ICD-10-CM

## 2020-08-24 DIAGNOSIS — Z95.5 S/P DRUG ELUTING CORONARY STENT PLACEMENT: ICD-10-CM

## 2020-08-24 DIAGNOSIS — K27.9 PUD (PEPTIC ULCER DISEASE): ICD-10-CM

## 2020-08-24 DIAGNOSIS — I10 HYPERTENSION, ESSENTIAL: ICD-10-CM

## 2020-08-24 DIAGNOSIS — Z78.9 STATIN INTOLERANCE: ICD-10-CM

## 2020-08-24 DIAGNOSIS — D18.00 HEMANGIOMA, UNSPECIFIED SITE: ICD-10-CM

## 2020-08-24 PROCEDURE — 3017F COLORECTAL CA SCREEN DOC REV: CPT | Performed by: SPECIALIST

## 2020-08-24 PROCEDURE — 99214 OFFICE O/P EST MOD 30 MIN: CPT | Performed by: SPECIALIST

## 2020-08-24 PROCEDURE — G8536 NO DOC ELDER MAL SCRN: HCPCS | Performed by: SPECIALIST

## 2020-08-24 PROCEDURE — G8432 DEP SCR NOT DOC, RNG: HCPCS | Performed by: SPECIALIST

## 2020-08-24 PROCEDURE — 93000 ELECTROCARDIOGRAM COMPLETE: CPT | Performed by: SPECIALIST

## 2020-08-24 PROCEDURE — 1101F PT FALLS ASSESS-DOCD LE1/YR: CPT | Performed by: SPECIALIST

## 2020-08-24 PROCEDURE — G8752 SYS BP LESS 140: HCPCS | Performed by: SPECIALIST

## 2020-08-24 PROCEDURE — 2022F DILAT RTA XM EVC RTNOPTHY: CPT | Performed by: SPECIALIST

## 2020-08-24 PROCEDURE — G8754 DIAS BP LESS 90: HCPCS | Performed by: SPECIALIST

## 2020-08-24 PROCEDURE — G8427 DOCREV CUR MEDS BY ELIG CLIN: HCPCS | Performed by: SPECIALIST

## 2020-08-24 PROCEDURE — 3046F HEMOGLOBIN A1C LEVEL >9.0%: CPT | Performed by: SPECIALIST

## 2020-08-24 PROCEDURE — G8420 CALC BMI NORM PARAMETERS: HCPCS | Performed by: SPECIALIST

## 2020-08-24 NOTE — PROGRESS NOTES
HISTORY OF PRESENT ILLNESS  Augustina Durham is a 69 y.o. male. He is seen in follow-up for coronary disease. He had a difficult intervention on his right coronary artery about a month ago and has been worse since. He also has a long history of peptic ulcer disease and had a Billroth procedure done about 40 years ago. He has had problems of iron deficiency anemia with an unclear bleeding source since that time. He has pain in his right upper quadrant which is somewhat persistent. He has chest pain with walking radiating to his jaw. He improved somewhat with stopping his aspirin and fish oil but his hemoglobin is still low at 9.1. He was discovered to have multiple hemangiomas about 20 years ago may be that his bleeding source was hard to locate in his gastrointestinal tract and a capsule study was necessary. He does not have a gastroenterologist here. [facility]  Patient Active Problem List   Diagnosis Code    PUD (peptic ulcer disease) K27.9    Family history of coronary arteriosclerosis Z82.49    [facility] E11.[facility]    Hemangioma D18.00    Cataract H26.9    Anemia D64.9    Chest pain R07.9    S/p bare metal coronary artery stent Z95.5    CAD in native artery I25.10    Hypertension, essential I10    S/P drug eluting coronary stent placement Z95.5    Status post cardiac catheterization Z98.890    CAD (coronary artery disease) I25.10     Current Outpatient Medications   Medication Sig Dispense Refill    metoprolol succinate (TOPROL-XL) 50 mg XL tablet Take 0.5 Tabs by mouth daily. 30 Tab 5    rosuvastatin (CRESTOR) 20 mg tablet Take 1 Tab by mouth nightly. 30 Tab 1    clopidogreL (PLAVIX) 75 mg tab Take 1 Tab by mouth daily. 30 Tab 3    lisinopriL (PRINIVIL, ZESTRIL) 20 mg tablet TAKE 1 TABLET BY MOUTH EVERY DAY 90 Tab 1    nitroglycerin (NITROSTAT) 0.4 mg SL tablet 1 Tab by SubLINGual route every five (5) minutes as needed for Chest Pain. Do not exceed 3 doses in 24 hours.  1 Bottle 3    ferrous sulfate 325 mg (65 mg iron) tablet Take  by mouth three (3) times daily (with meals).  cholecalciferol (VITAMIN D3) 1,000 unit cap Take 1,000 Units by mouth daily.  metFORMIN (GLUCOPHAGE) 500 mg tablet Take 500 mg by mouth four (4) times daily (with meals).  GLIPIZIDE PO Take 5 mg by mouth three (3) times daily.  sucralfate (CARAFATE) 100 mg/mL suspension Take 1 g by mouth four (4) times daily.  aspirin 81 mg chewable tablet Take 1 Tab by mouth daily. 30 Tab 1    Omega-3 Fatty Acids 300 mg cap Take 1 Tab by mouth daily. Past Medical History:   Diagnosis Date    Anemia     Dr Calista Turner found ooze at site of Bilroth anastomosis, was on sucralfate, saw Dr Sebastian El CAD in native artery 11/03/2017    admit [place], RCA 99% mid, PLB prox 90% collaterals from [address] Diabetes ([facility] 11/03/2017    on metformin    Family history of coronary arteriosclerosis     Hemangioma     splenectomy-Mccormick    Hypertension, essential 12/6/2017    NSTEMI (non-ST elevated myocardial infarction) ([facility] 11/03/2017    troponin 0.67    PUD (peptic ulcer disease) 1977    Bilroth 2    S/p bare metal coronary artery stent 12/6/2017    S/P drug eluting coronary stent placement 3/25/2018    Statin intolerance 11/30/2017    declined Lipitor post BMS     Past Surgical History:   Procedure Laterality Date    HX OTHER SURGICAL      splenectomy       Review of Systems   Constitutional: Positive for malaise/fatigue. Cardiovascular: Positive for chest pain. All other systems reviewed and are negative. Visit Vitals  /62 (BP 1 Location: Left arm, BP Patient Position: Sitting)   Pulse (!) 57   Resp 15   Ht 6' 1\" (1.854 m)   Wt 183 lb (83 kg)   SpO2 90%   BMI 24.14 kg/m²       Physical Exam   Constitutional: He is oriented to person, place, and time. He appears well-nourished. HENT:   Head: Atraumatic. Eyes: Conjunctivae are normal.   Neck: Neck supple.    Cardiovascular: Normal rate, regular rhythm and normal heart sounds. Exam reveals no gallop and no friction rub. No murmur heard. Pulmonary/Chest: Breath sounds normal. He has no wheezes. He has no rales. Abdominal: Bowel sounds are normal.   Musculoskeletal:         General: No edema. Neurological: He is oriented to person, place, and time. Skin: Skin is dry. Psychiatric: His behavior is normal.   Nursing note and vitals reviewed. ASSESSMENT and PLAN  First it seemed that some of his symptoms might have been due to his worsened anemia after the procedure. I stopped his aspirin and his fish oil and continued his Plavix and he did seem to improve but his hemoglobin has not improved. This morning he had chest pain with walking radiating to his jaw and he took a nitroglycerin. I feel that he needs to undergo repeat cardiac catheterization and I will arrange this with Dr. Chapo Cochran. I will check his blood work today. I will also make a referral to a gastroenterologist because I do think he has ongoing gastrointestinal blood loss.

## 2020-08-24 NOTE — PROGRESS NOTES
Per Dr. Ankur Nguyen verbal order, patient needs to be scheduled for another [facility] with Dr. Alberta Pierce. Patient and spouse would like to discuss concerns with Dr. Alberta Pierce in office prior to scheduling. Patient was given lab slip. Referral to GI placed in CC per Dr. Yonis Denis. Patient will call for appointment.

## 2020-08-25 ENCOUNTER — TELEPHONE (OUTPATIENT)
Dept: CARDIOLOGY CLINIC | Age: 75
End: 2020-08-25

## 2020-08-25 ENCOUNTER — OFFICE VISIT (OUTPATIENT)
Dept: CARDIOLOGY CLINIC | Age: 75
End: 2020-08-25
Payer: MEDICARE

## 2020-08-25 VITALS
SYSTOLIC BLOOD PRESSURE: 110 MMHG | BODY MASS INDEX: 24.25 KG/M2 | HEIGHT: 73 IN | WEIGHT: 183 LBS | RESPIRATION RATE: 16 BRPM | DIASTOLIC BLOOD PRESSURE: 60 MMHG | OXYGEN SATURATION: 100 % | HEART RATE: 63 BPM

## 2020-08-25 DIAGNOSIS — E11.9 TYPE 2 DIABETES MELLITUS WITHOUT COMPLICATION, WITHOUT LONG-TERM CURRENT USE OF INSULIN (HCC): ICD-10-CM

## 2020-08-25 DIAGNOSIS — Z95.5 S/P DRUG ELUTING CORONARY STENT PLACEMENT: ICD-10-CM

## 2020-08-25 DIAGNOSIS — K27.9 PUD (PEPTIC ULCER DISEASE): ICD-10-CM

## 2020-08-25 DIAGNOSIS — I25.119 CORONARY ARTERY DISEASE INVOLVING NATIVE CORONARY ARTERY OF NATIVE HEART WITH ANGINA PECTORIS (HCC): ICD-10-CM

## 2020-08-25 DIAGNOSIS — D50.8 OTHER IRON DEFICIENCY ANEMIA: ICD-10-CM

## 2020-08-25 DIAGNOSIS — I25.10 CAD IN NATIVE ARTERY: ICD-10-CM

## 2020-08-25 DIAGNOSIS — R07.9 CHEST PAIN, UNSPECIFIED TYPE: Primary | ICD-10-CM

## 2020-08-25 DIAGNOSIS — I10 HYPERTENSION, ESSENTIAL: ICD-10-CM

## 2020-08-25 LAB
BUN SERPL-MCNC: 9 MG/DL (ref 8–27)
BUN/CREAT SERPL: 13 (ref 10–24)
CALCIUM SERPL-MCNC: 8.9 MG/DL (ref 8.6–10.2)
CHLORIDE SERPL-SCNC: 102 MMOL/L (ref 96–106)
CO2 SERPL-SCNC: 25 MMOL/L (ref 20–29)
CREAT SERPL-MCNC: 0.68 MG/DL (ref 0.76–1.27)
ERYTHROCYTE [DISTWIDTH] IN BLOOD BY AUTOMATED COUNT: 13.6 % (ref 11.6–15.4)
GLUCOSE SERPL-MCNC: 195 MG/DL (ref 65–99)
HCT VFR BLD AUTO: 30.4 % (ref 37.5–51)
HGB BLD-MCNC: 9.5 G/DL (ref 13–17.7)
MCH RBC QN AUTO: 27.1 PG (ref 26.6–33)
MCHC RBC AUTO-ENTMCNC: 31.3 G/DL (ref 31.5–35.7)
MCV RBC AUTO: 87 FL (ref 79–97)
PLATELET # BLD AUTO: 322 X10E3/UL (ref 150–450)
POTASSIUM SERPL-SCNC: 4.5 MMOL/L (ref 3.5–5.2)
RBC # BLD AUTO: 3.51 X10E6/UL (ref 4.14–5.8)
SODIUM SERPL-SCNC: 140 MMOL/L (ref 134–144)
WBC # BLD AUTO: 6.2 X10E3/UL (ref 3.4–10.8)

## 2020-08-25 PROCEDURE — 3046F HEMOGLOBIN A1C LEVEL >9.0%: CPT | Performed by: INTERNAL MEDICINE

## 2020-08-25 PROCEDURE — 3017F COLORECTAL CA SCREEN DOC REV: CPT | Performed by: INTERNAL MEDICINE

## 2020-08-25 PROCEDURE — 99215 OFFICE O/P EST HI 40 MIN: CPT | Performed by: INTERNAL MEDICINE

## 2020-08-25 PROCEDURE — G8754 DIAS BP LESS 90: HCPCS | Performed by: INTERNAL MEDICINE

## 2020-08-25 PROCEDURE — G8427 DOCREV CUR MEDS BY ELIG CLIN: HCPCS | Performed by: INTERNAL MEDICINE

## 2020-08-25 PROCEDURE — 1101F PT FALLS ASSESS-DOCD LE1/YR: CPT | Performed by: INTERNAL MEDICINE

## 2020-08-25 PROCEDURE — G8752 SYS BP LESS 140: HCPCS | Performed by: INTERNAL MEDICINE

## 2020-08-25 PROCEDURE — G8432 DEP SCR NOT DOC, RNG: HCPCS | Performed by: INTERNAL MEDICINE

## 2020-08-25 PROCEDURE — 2022F DILAT RTA XM EVC RTNOPTHY: CPT | Performed by: INTERNAL MEDICINE

## 2020-08-25 PROCEDURE — G8420 CALC BMI NORM PARAMETERS: HCPCS | Performed by: INTERNAL MEDICINE

## 2020-08-25 PROCEDURE — G8536 NO DOC ELDER MAL SCRN: HCPCS | Performed by: INTERNAL MEDICINE

## 2020-08-25 NOTE — TELEPHONE ENCOUNTER
----- Message from Hina Nair MD sent at 8/25/2020 10:46 AM EDT -----  His anemia is slightly improved  ----- Message -----  From: Sondra Treviño Lab Results In  Sent: 8/25/2020   5:37 AM EDT  To: Hina Nair MD

## 2020-08-25 NOTE — LETTER
8/31/20 Patient: Alin Johnson YOB: 1945 Date of Visit: 8/25/2020 Guanakito Vann MD 
[id number] [id number] VIA Facsimile: [phone] Dear Guanakito Vann MD, Thank you for referring Mr. Alin Johnson to [address] [place] [address] for evaluation. My notes for this consultation are attached. If you have questions, please do not hesitate to call me. I look forward to following your patient along with you.  
 
 
Sincerely, 
 
Skip Lara MD

## 2020-08-31 ENCOUNTER — TELEPHONE (OUTPATIENT)
Dept: CARDIOLOGY CLINIC | Age: 75
End: 2020-08-31

## 2020-08-31 NOTE — TELEPHONE ENCOUNTER
Pt's spouse states the pt went to [facility] to get COVID testing but was told no one scheduled him for the test today.  Please advise      Phone:[phone]

## 2020-08-31 NOTE — TELEPHONE ENCOUNTER
Returned call to patient wife. She stated that she can not get anyone to answer at central scheduling. Informed wife that I would attempt to call and call her back. 2:25 PM  Central scheduling is ring busy.  [phone]     3:30 PM  Emailed sent to [facility] and they stated that patient wife had called and scheduled stress test.

## 2020-08-31 NOTE — PROGRESS NOTES
[facility] [place]: Claudia Broussard  [phone]          Cardiology Consult/Progress Note      Requesting/referring provider: Dr. Lewis Felty, Dr. Kayy Unger  Reason for Consult: Complex coronary disease    HPI: [address] Barber, a 69y.o. year-old who presents for evaluation of coronary artery disease. He has history of coronary disease predominantly involving right coronary artery. He presented with non-ST elevation microinfarction about 2 years ago and underwent staged placement of stent initially in mid RCA. Due to recurrent chest discomfort he had another stent placed later in distal RCA the same year. Recently he reported more chest discomfort and underwent cardiac catheterization which demonstrated recurrent in-stent restenosis in the bare-metal stent in mid RCA. Hence he underwent cardiac catheterization with placement of another drug-eluting stent inside the bare-metal stent in the most critical segment. The proximal portion of the bare-metal stent was underexpanded and hence was not restented as despite high-pressure balloon inflation it did not open up. After undergoing the procedure patient started feeling better, continues to have chest discomfort. He reports this chest discomfort on walking about 200 yards. He has to take 1-2 nitroglycerin tablets every day to make the pain better. Pain is reported predominantly in the form of burning sensation in the upper chest and in the jaw. Interestingly after reviewing the history patient never had prompt relief in similar symptoms after any prior interventions to his RCA. He has history of hypertension, diabetes, hyperlipidemia and has been compliant with his medications. He also has prior history of Billroth II surgery in his young age for significant peptic ulcer disease. Subsequently he had issues with recurrent GI bleeding because of anastomotic ulcerations.   His hemoglobin has recently dropped and now is ranging between 9 to 10 mg/dL compared to 11-12 in the past.    Investigations personally reviewed by me  ECG August 2020: Sinus bradycardia, nonspecific ST-T changes  Cardiac catheterization July 2020: 75 to 80% in-stent restenosis in distal part of the mid RCA stent treated with her new drug-eluting stent placement. Proximal part of the stent had 50-70% in-stent restenosis because of underexpanded stent. This part of the stent was treated with high-pressure balloon angioplasty but there was residual 40% stenosis. Due to underexpansion of the stent because of external calcium another stent was not placed internally. Rest of the coronaries including the distal RCA stent as well as left circumflex and left anterior descending artery were free of significant obstructive disease. Nuclear SPECT imaging January 2020: This showed normal LV function and no evidence of ischemia    Assessment/Plan:  1. Hypertension  2. Hyperlipidemia  3. Type 2 diabetes mellitus  4. Anemia multifactorial  5. History of prior Billroth II surgery  6. History of hemangiomas and spleen and liver  7. Coronary disease in the form of single-vessel disease with recurrent angina-like symptoms with some component suggestive of possible true angina while lack of relief with PCI's on multiple occasions suggest possible alternative etiology such as esophageal spasm etc.  He has had prior upper GI which have showed no evidence of significant esophagitis. 8.  Recent PCI to mid RCA with lack of significant relief in symptoms and residual 40 to 50% in-stent restenosis in proximal portion of mid RCA stent. Patient was seen for continued angina despite PCI. Given the results of the procedure, I feel he should have had some relief in his symptoms. Nevertheless he reports no improvement in fact may be slight worsening of his so-called anginal symptoms for which she is to take more nitroglycerin now.   With only residual 40% stenosis I am not really sure whether is symptoms can be explained by coronary disease although technically they could be recoil and restenosis and recently treated portion with PTCA. However going back to his history he never had complete anginal relief even with his prior PCI's. Moreover there was no objective evidence of ischemia on his stress test earlier this year. In order to ensure that his symptoms are truly anginal, I suspect proceeding with a coronary angiogram may be reasonable to confirm objective evidence of ischemia. Hence I have suggested doing an echo stress test with an ECG stress test to which patient and his wife are agreeable. If that does in fact suggest ischemia in the inferior wall, we may consider performing a coronary angiography with the plan to perform possible laser or rotational atherectomy to expand the underexpanded stent and consider placing another stent inside it. On the other hand if there is no objective evidence of ischemia, given the relief with nitroglycerin we may consider being evaluated for esophageal spasm given his prior extensive history of gastric surgery and disease. From a standpoint of hypertension, hyperlipidemia he appears to be stable with no further changes or intervention required. He does have diabetes mellitus and his latest HbA1c is 8.2.   I defer management of that to his primary care physician.    []    High complexity decision making was performed  []    Patient is at high-risk of decompensation with multiple organ involvement  He  has a past medical history of Anemia, CAD in native artery (11/03/2017), Cataract, Diabetes ([address] (11/03/2017), Family history of coronary arteriosclerosis, Hemangioma, Hypertension, essential (12/6/2017), NSTEMI (non-ST elevated myocardial infarction) ([address] (11/03/2017), PUD (peptic ulcer disease) (1977), S/p bare metal coronary artery stent (12/6/2017), S/P drug eluting coronary stent placement (3/25/2018), and Statin intolerance (11/30/2017). Review of system:Patient reports no dyspnea/PND/Orthpnea. He reports no cough/fever/focal neurological deficits/abdominal pain. All other systems negative except as above. Family History   Problem Relation Age of Onset    Heart Attack Father 79      Social History     Socioeconomic History    Marital status:      Spouse name: Not on file    Number of children: Not on file    Years of education: Not on file    Highest education level: Not on file   Occupational History    Occupation:  [employer]   Tobacco Use    Smoking status: Never Smoker    Smokeless tobacco: Never Used   Substance and Sexual Activity    Alcohol use: No    Drug use: No      PE  Vitals:    08/25/20 1528   BP: 110/60   Pulse: 63   Resp: 16   SpO2: 100%   Weight: 183 lb (83 kg)   Height: 6' 1\" (1.854 m)    Body mass index is 24.14 kg/m². General:    Alert, cooperative, no distress. Psychiatric:    Normal Mood and affect    Eye/ENT:      Pupils equal, No asymmetry, Conjunctival pink. Able to hear voice at normal amplitude   Lungs:      Visibly symmetric chest expansion, No palpable tenderness. Clear to auscultation bilaterally. Heart[de-identified]    Regular rate and rhythm, S1, S2 normal, no murmur, click, rub or gallop. No JVD, Normal palpable peripheral pulses. No cyanosis   Abdomen:     Soft, non-tender. Bowel sounds normal. No masses,  No      organomegaly. Extremities:   Extremities normal, atraumatic, no edema. Neurologic:   CN II-XII grossly intact.  No gross focal deficits           Recent Labs:  Lab Results   Component Value Date/Time    Cholesterol, total 128 07/20/2020 09:41 AM    HDL Cholesterol 60 07/20/2020 09:41 AM    LDL, calculated 56 07/20/2020 09:41 AM    Triglyceride 59 07/20/2020 09:41 AM    CHOL/HDL Ratio 3.4 11/04/2017 12:55 AM     Lab Results   Component Value Date/Time    Creatinine 0.68 (L) 08/24/2020 10:43 AM     Lab Results   Component Value Date/Time    BUN 9 08/24/2020 10:43 AM     Lab Results   Component Value Date/Time    Potassium 4.5 08/24/2020 10:43 AM     Lab Results   Component Value Date/Time    Hemoglobin A1c 8.2 (H) 11/04/2017 12:41 AM     Lab Results   Component Value Date/Time    HGB 9.5 (L) 08/24/2020 10:43 AM     Lab Results   Component Value Date/Time    PLATELET 426 22/28/5973 10:43 AM       Reviewed:  Past Medical History:   Diagnosis Date    Anemia     Dr Ladan Knowles found ooze at site of Bilroth anastomosis, was on sucralfate, saw Dr Danae Juan CAD in native artery 11/03/2017    admit [place], RCA 99% mid, PLB prox 90% collaterals from [address] Diabetes ([facility] 11/03/2017    on metformin    Family history of coronary arteriosclerosis     Hemangioma     splenectomy-Mccormick    Hypertension, essential 12/6/2017    NSTEMI (non-ST elevated myocardial infarction) ([facility] 11/03/2017    troponin 0.67    PUD (peptic ulcer disease) 1977    Bilroth 2    S/p bare metal coronary artery stent 12/6/2017    S/P drug eluting coronary stent placement 3/25/2018    Statin intolerance 11/30/2017    declined Lipitor post BMS     Social History     Tobacco Use   Smoking Status Never Smoker   Smokeless Tobacco Never Used     Social History     Substance and Sexual Activity   Alcohol Use No     Allergies   Allergen Reactions    Sulfa (Sulfonamide Antibiotics) Hives     Family History   Problem Relation Age of Onset    Heart Attack Father 79        Current Outpatient Medications   Medication Sig    metoprolol succinate (TOPROL-XL) 50 mg XL tablet Take 0.5 Tabs by mouth daily.  rosuvastatin (CRESTOR) 20 mg tablet Take 1 Tab by mouth nightly.  clopidogreL (PLAVIX) 75 mg tab Take 1 Tab by mouth daily.  lisinopriL (PRINIVIL, ZESTRIL) 20 mg tablet TAKE 1 TABLET BY MOUTH EVERY DAY    nitroglycerin (NITROSTAT) 0.4 mg SL tablet 1 Tab by SubLINGual route every five (5) minutes as needed for Chest Pain. Do not exceed 3 doses in 24 hours.     cholecalciferol (VITAMIN D3) 1,000 unit cap Take 1,000 Units by mouth daily.  metFORMIN (GLUCOPHAGE) 500 mg tablet Take 500 mg by mouth four (4) times daily (with meals).  GLIPIZIDE PO Take 5 mg by mouth three (3) times daily.  sucralfate (CARAFATE) 100 mg/mL suspension Take 1 g by mouth four (4) times daily.  ferrous sulfate 325 mg (65 mg iron) tablet Take  by mouth three (3) times daily (with meals).  aspirin 81 mg chewable tablet Take 1 Tab by mouth daily.  Omega-3 Fatty Acids 300 mg cap Take 1 Tab by mouth daily. No current facility-administered medications for this visit. Lisy Deleon MD08/31/20 There are other unrelated non-urgent complaints, but due to the busy schedule and the amount of time I've already spent with him, time does not permit me to address these routine issues at today's visit. I've requested another appointment to review these additional issues. ATTENTION:   This medical record was transcribed using an electronic medical records/speech recognition system. Although proofread, it may and can contain electronic, spelling and other errors. Corrections may be executed at a later time. Please feel free to contact us for any clarifications as needed.     [facility] [place]  [address], [address] [facility], [address] [place] [address]

## 2020-09-01 ENCOUNTER — HOSPITAL ENCOUNTER (OUTPATIENT)
Dept: PREADMISSION TESTING | Age: 75
Discharge: HOME | End: 2020-09-01
Payer: MEDICARE

## 2020-09-01 DIAGNOSIS — Z01.812 PRE-PROCEDURE LAB EXAM: ICD-10-CM

## 2020-09-01 PROCEDURE — 87635 SARS-COV-2 COVID-19 AMP PRB: CPT

## 2020-09-02 LAB — SARS-COV-2, COV2NT: NOT DETECTED

## 2020-09-04 ENCOUNTER — HOSPITAL ENCOUNTER (OUTPATIENT)
Dept: NON INVASIVE DIAGNOSTICS | Age: 75
Discharge: HOME | End: 2020-09-04
Attending: INTERNAL MEDICINE
Payer: MEDICARE

## 2020-09-04 VITALS
SYSTOLIC BLOOD PRESSURE: 117 MMHG | BODY MASS INDEX: 24.25 KG/M2 | DIASTOLIC BLOOD PRESSURE: 64 MMHG | HEIGHT: 73 IN | WEIGHT: 183 LBS

## 2020-09-04 DIAGNOSIS — I25.10 CAD IN NATIVE ARTERY: ICD-10-CM

## 2020-09-04 DIAGNOSIS — R07.9 CHEST PAIN, UNSPECIFIED TYPE: ICD-10-CM

## 2020-09-04 LAB
STRESS ANGINA INDEX: 0
STRESS BASELINE HR: 51 BPM
STRESS ESTIMATED WORKLOAD: 7 METS
STRESS EXERCISE DUR MIN: NORMAL
STRESS PEAK DIAS BP: 81 MMHG
STRESS PEAK SYS BP: 194 MMHG
STRESS PERCENT HR ACHIEVED: 86 %
STRESS POST PEAK HR: 126 BPM
STRESS RATE PRESSURE PRODUCT: NORMAL BPM*MMHG
STRESS TARGET HR: 147 BPM

## 2020-09-04 PROCEDURE — 93351 STRESS TTE COMPLETE: CPT

## 2020-11-18 ENCOUNTER — TELEPHONE (OUTPATIENT)
Dept: CARDIOLOGY CLINIC | Age: 75
End: 2020-11-18

## 2020-11-18 NOTE — TELEPHONE ENCOUNTER
[facility] is requesting cardiac clearance for patient to undergo endoscopy procedure. Clearance to include holding plavix if okay. If okay, how many days can he hold?     Fax #: [phone]

## 2020-11-19 RX ORDER — LISINOPRIL 20 MG/1
TABLET ORAL
Qty: 90 TAB | Refills: 1 | Status: SHIPPED | OUTPATIENT
Start: 2020-11-19 | End: 2021-05-24

## 2020-11-19 NOTE — TELEPHONE ENCOUNTER
MD Myrna Cotto, RN    Caller: Unspecified (Yesterday,  4:26 PM)               5 days, only do endoscopy now if felt to be essential

## 2020-11-20 RX ORDER — CLOPIDOGREL BISULFATE 75 MG/1
TABLET ORAL
Qty: 90 TAB | Refills: 1 | Status: SHIPPED | OUTPATIENT
Start: 2020-11-20 | End: 2021-02-16 | Stop reason: ALTCHOICE

## 2021-02-11 NOTE — DIABETES MGMT
[id number] [address]  Dept: [phone]  Dept Fax: [phone]  Loc: Cora Briones is a 41 y.o. male who presents today for:  Chief Complaint   Patient presents with    Other     Pt is c/o dry mouth. He states this has been going on for awhile, but is worsening.  Joint Pain     Pt is c/o lower back and leg joint and muscle pain that has been going on for about 3-4 months. He has been taking ibuprofen 800mg about QID with no relief. HPI:   Dry mouth has been going on for a long time but knows that it is secondary to his methadone. Joint pain and muscle pain: Works at [employer]. Notices it more in the morning but then gets better throughout the day. Not really sure if it's joints or muscles but he knows he's miserable. He will take Ibuprofen daily at least 4-5 times a day. LE swelling: Good about wearing compression stockings. Reports he is taking his water pill. Cirrhosis: sees GI doctor at [facility] every 6 months.      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 Patient is a 71 y.o. male with hx Type 2 Diabetes on Glipizide 5 mg TID and Metformin 500 mg 4 times a day at home. Pt states to have a variable schedule and he takes his medications according to his physical activity and what he eats. I encouraged him to discuss his DM medications with his doctor and to take it as prescribed. He checks his blood sugars 3-4 times a day, before meals and reports BS range 110-150 mg/dl. Assessed and instructed patient on the following:   ·  interpretation of lab results, blood sugar goals, A1c target (<6%), complications of diabetes mellitus, hypoglycemia prevention and treatment, exercise, SMBG skills, nutrition and referred to Diabetes Educator    Encouraged the following:   · dietary modifications: add vegetables and protein with meals, eat regular meals, regular blood sugar monitorin times daily, follow up with his PCP and take his DM medications as prescribed     Provided patient with the following: [x]             Survival skills education materials               []             Insulin education materials               []             CHO counting education materials               [x]             Outpatient DTC contact number               []             Glucometer                   Discussed with patient need for follow up appointment for diabetes management after discharge.       A1c:   Lab Results   Component Value Date/Time    Hemoglobin A1c 8.2 2017 12:41 AM       Recent Glucose Results: Lab Results   Component Value Date/Time    GLUCPOC 92 2017 06:12 AM    GLUCPOC 136 (H) 2017 09:16 PM    GLUCPOC 96 2017 04:21 PM        Lab Results   Component Value Date/Time    Creatinine 0.65 2017 04:55 PM     Estimated Creatinine Clearance: 117.7 mL/min (based on Cr of 0.65). Active Orders   Diet    DIET CARDIAC Regular        PO intake: Patient Vitals for the past 72 hrs:   % Diet Eaten   11/05/17 2350 0 %   11/05/17 2000 0 %       Current hospital DM medication: Glipizide SR 5 mg daily and Humalog for correction, high sensitivity scale      Will continue to follow as needed. Thank you.     Kodi Araiza RD  Heart Disease Mother     Substance Abuse Father     Heart Disease Father     Depression Sister     Substance Abuse Brother     Substance Abuse Maternal Aunt     Substance Abuse Maternal Uncle     Substance Abuse Paternal Aunt     Substance Abuse Paternal Uncle     Substance Abuse Maternal Grandmother     Depression Maternal Grandmother      Social History     Tobacco Use    Smoking status: Current Every Day Smoker     Packs/day: 0.25     Years: 30.00     Pack years: 7.50     Types: Cigarettes    Smokeless tobacco: Never Used    Tobacco comment: 6 per day    Substance Use Topics    Alcohol use: No      Current Outpatient Medications   Medication Sig Dispense Refill    tiZANidine (ZANAFLEX) 4 MG tablet Take 1 tablet by mouth 3 times daily as needed (muscle pains) Take 1 tablet(s) every 6 hours by oral route for 31 days.  60 tablet 0    ONETOUCH ULTRA strip USE 1 STRIP TO CHECK GLUCOSE THREE TIMES DAILY 100 each 0    escitalopram (LEXAPRO) 10 MG tablet Take 1 tablet by mouth once daily 30 tablet 5    furosemide (LASIX) 40 MG tablet Take 1 tablet by mouth once daily 30 tablet 5    Multiple Vitamin (MULTIVITAMIN PO) Take by mouth daily      gabapentin (NEURONTIN) 800 MG tablet TAKE 1 TABLET BY MOUTH THREE TIMES DAILY 270 tablet 3    ipratropium-albuterol (DUONEB) 0.5-2.5 (3) MG/3ML SOLN nebulizer solution Take 3 mLs by nebulization every 6 hours (Patient taking differently: Take 1 vial by nebulization every 6 hours as needed ) 360 mL 3    lisinopril (PRINIVIL;ZESTRIL) 5 MG tablet Take 1 tablet by mouth once daily 90 tablet 3    loratadine (CLARITIN) 10 MG tablet Take 1 tablet by mouth nightly 90 tablet 3    pantoprazole (PROTONIX) 40 MG tablet Take 1 tablet by mouth once daily 90 tablet 3    tiotropium (SPIRIVA HANDIHALER) 18 MCG inhalation capsule Inhale 1 puff by mouth once daily 90 capsule 3  ondansetron (ZOFRAN ODT) 4 MG disintegrating tablet Take 1 tablet by mouth every 8 hours as needed for Nausea 20 tablet 0    polyethylene glycol (GLYCOLAX) 17 GM/SCOOP powder Take 17 g by mouth as needed      tenofovir disoproxil fumarate (VIREAD) 300 MG tablet Take 300 mg by mouth daily      Aimsco Ultra Thin Lancets MISC 1 applicator by Does not apply route 2 times daily 100 each 3    lidocaine (LIDODERM) 5 % Place 1 patch onto the skin daily 12 hours on, 12 hours off. 15 patch 0    sucralfate (CARAFATE) 1 GM/10ML suspension Take 10 mLs by mouth 4 times daily (Patient taking differently: Take 1 g by mouth 4 times daily as needed ) 200 mL 0    albuterol sulfate HFA (PROVENTIL HFA) 108 (90 Base) MCG/ACT inhaler Inhale 2 puffs into the lungs every 4 hours as needed for Wheezing or Shortness of Breath (Space out to every 6 hours as symptoms improve) Space out to every 6 hours as symptoms improve. 1 Inhaler 3    sodium chloride (ALTAMIST SPRAY) 0.65 % nasal spray 1 spray by Nasal route as needed for Congestion 1 Bottle 3    METHADONE HCL PO Take 106 mg by mouth daily        No current facility-administered medications for this visit.       Allergies   Allergen Reactions    Adhesive Tape      Other reaction(s): Hives    Flexeril [Cyclobenzaprine] Hives     Pt unsure    Morphine Hives    Quetiapine Other (See Comments)     restless       Health Maintenance   Topic Date Due    Hepatitis A vaccine (2 of 3 - Hep A Twinrix risk 3-dose series) 07/10/2013    Hepatitis B vaccine (2 of 3 - Hep B Twinrix risk 3-dose series) 07/10/2013    Diabetic retinal exam  06/04/2019    Diabetic microalbuminuria test  02/13/2021    Lipid screen  02/13/2021    Potassium monitoring  08/18/2021    Creatinine monitoring  08/18/2021    A1C test (Diabetic or Prediabetic)  10/08/2021    Diabetic foot exam  01/21/2022    DTaP/Tdap/Td vaccine (4 - Td) 11/13/2025    Flu vaccine  Completed  Pneumococcal 0-64 years Vaccine  Completed    HIV screen  Completed    Hib vaccine  Aged Out    Meningococcal (ACWY) vaccine  Aged Out       Subjective:      Review of Systems   Constitutional: Negative for chills and fever. Respiratory: Negative for cough and shortness of breath. Cardiovascular: Negative for chest pain. Gastrointestinal: Negative for nausea and vomiting. Musculoskeletal: Positive for joint swelling and myalgias. Skin: Negative for rash and wound. Neurological: Negative for light-headedness and headaches. Objective:     Vitals:    02/11/21 0805   BP: 128/72   Pulse: 83   Resp: 16   Temp: 97.5 °F (36.4 °C)   SpO2: 98%   Weight: 258 lb 12.8 oz (117.4 kg)   Height: 6' 1\" (1.854 m)       Body mass index is 34.14 kg/m². Wt Readings from Last 3 Encounters:   02/11/21 258 lb 12.8 oz (117.4 kg)   01/21/21 259 lb 12.8 oz (117.8 kg)   11/06/20 244 lb 9.6 oz (110.9 kg)     BP Readings from Last 3 Encounters:   02/11/21 128/72   01/21/21 128/62   11/06/20 130/78       Physical Exam  Constitutional:       Appearance: He is obese. HENT:      Head: Normocephalic and atraumatic. Right Ear: External ear normal.      Left Ear: External ear normal.   Eyes:      Conjunctiva/sclera: Conjunctivae normal.   Cardiovascular:      Rate and Rhythm: Normal rate and regular rhythm. Pulses: Normal pulses. Heart sounds: Normal heart sounds. Pulmonary:      Effort: Pulmonary effort is normal.      Breath sounds: Normal breath sounds. Abdominal:      General: There is distension. Palpations: Abdomen is soft. Tenderness: There is no abdominal tenderness. There is no guarding. Musculoskeletal:      Right lower leg: Edema present. Left lower leg: Edema present. Skin:     General: Skin is warm and dry. Neurological:      Mental Status: He is oriented to person, place, and time.    Psychiatric:         Mood and Affect: Mood normal.         Behavior: Behavior normal. Lab Results   Component Value Date    WBC 5.2 08/18/2020    HGB 13.5 (L) 08/18/2020    HCT 39.5 (L) 08/18/2020     (L) 08/18/2020    CHOL 90 02/13/2020    TRIG 143 02/13/2020    HDL 28 (L) 02/13/2020    AST 30 08/18/2020    AST 31 08/18/2020     08/18/2020    K 4.0 08/18/2020     08/18/2020    CREATININE 0.7 08/18/2020    BUN 8 08/18/2020    CO2 25 08/18/2020    TSH 1.78 10/11/2017    INR 1.2 05/01/2018    LABA1C 5.6 10/08/2020    LABMICR CANNOT BE CALCULATED 02/13/2020    LABGLOM >90 08/18/2020    MG 1.9 10/13/2016    CALCIUM 9.0 08/18/2020       Imaging Results:    No results found. Assessment / Plan: Loli Aaron was seen today for other and joint pain. Diagnoses and all orders for this visit:    Myopathy:  - Unclear joint pain and muscle aches. 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   - Will stop Lipitor at this time given A1c 5.6%  - The ASCVD Risk score (Dorina Ordonez, et al., 2013) failed to calculate for the following reasons: The valid total cholesterol range is 130 to 320 mg/dL  - Will Zanaflex and see if that helps  - Will follow back in 2 weeks    -     tiZANidine (ZANAFLEX) 4 MG tablet; Take 1 tablet by mouth 3 times daily as needed (muscle pains) Take 1 tablet(s) every 6 hours by oral route for 31 days. Long-term current use of methadone for opiate dependence [facility]):  - 8 years clean, follows with clinic in [place]    Other cirrhosis of liver ([facility]  - Secondary to Hepatitis B. Following with GI at [address] hepatitis B no coma without hepatitis delta ([facility]  - On Viread. [facility]             Return in about 2 weeks (around 2/25/2021) for muscle aches. Medications Prescribed:  Orders Placed This Encounter   Medications    tiZANidine (ZANAFLEX) 4 MG tablet     Sig: Take 1 tablet by mouth 3 times daily as needed (muscle pains) Take 1 tablet(s) every 6 hours by oral route for 31 days.      Dispense:  60 tablet     Refill:  0 Future Appointments   Date Time Provider Didier Josephine   2/25/2021 10:00 AM Mohan Grimaldo MD [facility] [place]   3/2/2021  8:30 AM [facility] Powell [facility] [place]   3/9/2021  8:00 AM Rolanda Nicole MD [facility] [place]   4/15/2021  9:00 AM Ant Perkins DO [facility] [place]       Patient given educational materials - see patient instructions. Discussed use, benefit, and sideeffects of prescribed medications. All patient questions answered. Pt voiced understanding. Reviewed health maintenance. Instructed to continue current medications, diet and exercise. Patient agreed with treatment plan. Follow up as directed.      Electronically signed by Dc Soriano MD on 2/11/2021 at 9:38 AM

## 2021-02-16 ENCOUNTER — OFFICE VISIT (OUTPATIENT)
Dept: CARDIOLOGY CLINIC | Age: 76
End: 2021-02-16
Payer: MEDICARE

## 2021-02-16 VITALS
WEIGHT: 184.6 LBS | HEIGHT: 73 IN | SYSTOLIC BLOOD PRESSURE: 154 MMHG | HEART RATE: 58 BPM | DIASTOLIC BLOOD PRESSURE: 70 MMHG | BODY MASS INDEX: 24.46 KG/M2 | OXYGEN SATURATION: 98 % | RESPIRATION RATE: 16 BRPM

## 2021-02-16 DIAGNOSIS — I25.119 CORONARY ARTERY DISEASE INVOLVING NATIVE CORONARY ARTERY OF NATIVE HEART WITH ANGINA PECTORIS (HCC): ICD-10-CM

## 2021-02-16 DIAGNOSIS — D18.00 HEMANGIOMA, UNSPECIFIED SITE: ICD-10-CM

## 2021-02-16 DIAGNOSIS — E78.00 HYPERCHOLESTEREMIA: ICD-10-CM

## 2021-02-16 DIAGNOSIS — K27.9 PUD (PEPTIC ULCER DISEASE): ICD-10-CM

## 2021-02-16 DIAGNOSIS — D50.0 IRON DEFICIENCY ANEMIA DUE TO CHRONIC BLOOD LOSS: ICD-10-CM

## 2021-02-16 DIAGNOSIS — Z78.9 STATIN INTOLERANCE: ICD-10-CM

## 2021-02-16 DIAGNOSIS — E11.9 TYPE 2 DIABETES MELLITUS WITHOUT COMPLICATION, WITHOUT LONG-TERM CURRENT USE OF INSULIN (HCC): ICD-10-CM

## 2021-02-16 DIAGNOSIS — E11.65 TYPE 2 DIABETES MELLITUS WITH HYPERGLYCEMIA, WITHOUT LONG-TERM CURRENT USE OF INSULIN (HCC): ICD-10-CM

## 2021-02-16 DIAGNOSIS — Z95.5 S/P DRUG ELUTING CORONARY STENT PLACEMENT: ICD-10-CM

## 2021-02-16 DIAGNOSIS — I10 HYPERTENSION, ESSENTIAL: Primary | ICD-10-CM

## 2021-02-16 DIAGNOSIS — R07.9 CHEST PAIN, UNSPECIFIED TYPE: ICD-10-CM

## 2021-02-16 PROCEDURE — G8753 SYS BP > OR = 140: HCPCS | Performed by: SPECIALIST

## 2021-02-16 PROCEDURE — 99215 OFFICE O/P EST HI 40 MIN: CPT | Performed by: SPECIALIST

## 2021-02-16 PROCEDURE — G8420 CALC BMI NORM PARAMETERS: HCPCS | Performed by: SPECIALIST

## 2021-02-16 PROCEDURE — G8754 DIAS BP LESS 90: HCPCS | Performed by: SPECIALIST

## 2021-02-16 PROCEDURE — 2022F DILAT RTA XM EVC RTNOPTHY: CPT | Performed by: SPECIALIST

## 2021-02-16 PROCEDURE — G8536 NO DOC ELDER MAL SCRN: HCPCS | Performed by: SPECIALIST

## 2021-02-16 PROCEDURE — G8427 DOCREV CUR MEDS BY ELIG CLIN: HCPCS | Performed by: SPECIALIST

## 2021-02-16 PROCEDURE — G8510 SCR DEP NEG, NO PLAN REQD: HCPCS | Performed by: SPECIALIST

## 2021-02-16 PROCEDURE — 3017F COLORECTAL CA SCREEN DOC REV: CPT | Performed by: SPECIALIST

## 2021-02-16 PROCEDURE — 3046F HEMOGLOBIN A1C LEVEL >9.0%: CPT | Performed by: SPECIALIST

## 2021-02-16 PROCEDURE — 93000 ELECTROCARDIOGRAM COMPLETE: CPT | Performed by: SPECIALIST

## 2021-02-16 PROCEDURE — 1101F PT FALLS ASSESS-DOCD LE1/YR: CPT | Performed by: SPECIALIST

## 2021-02-16 RX ORDER — HYDROCHLOROTHIAZIDE 12.5 MG/1
12.5 TABLET ORAL DAILY
Qty: 30 TAB | Refills: 5 | Status: SHIPPED | OUTPATIENT
Start: 2021-02-16

## 2021-02-16 RX ORDER — OMEPRAZOLE 20 MG/1
20 CAPSULE, DELAYED RELEASE ORAL DAILY
COMMUNITY
Start: 2021-02-11

## 2021-02-16 NOTE — PROGRESS NOTES
HISTORY OF PRESENT ILLNESS  Camila Rust is a 77 y.o. male. He has coronary disease and has had stents placed in his right coronary artery. He also has a history of a Billroth gastric procedure with chronic gastrointestinal blood loss and ulceration. I last saw him 5 months ago for a stress test which was negative for ischemia. His chest pain has been somewhat better but still occurs on his left side sometimes with walking. It is unclear if he is still anemic although he tells me that his hemoglobin may be in the range of 10. He was started on testosterone shots and has had 2 of these. His blood pressure has been higher for the past 2 months when taken at home up to the range of 160. Sometimes the diastolic is over 255. He stopped taking his aspirin and fish oil. He wants to know if he can stop taking Plavix. His EKG in the office shows a heart rate of 58 bpm.  HPI  Patient Active Problem List   Diagnosis Code    PUD (peptic ulcer disease) K27.9    Family history of coronary arteriosclerosis Z82.49    Diabetes ([facility] E11.9    Hemangioma D18.00    Cataract H26.9    Anemia D64.9    Chest pain R07.9    S/p bare metal coronary artery stent Z95.5    CAD in native artery I25.10    Hypertension, essential I10    S/P drug eluting coronary stent placement Z95.5    Status post cardiac catheterization Z98.890    CAD (coronary artery disease) I25.10     Current Outpatient Medications   Medication Sig Dispense Refill    omeprazole (PRILOSEC) 20 mg capsule Take 20 mg by mouth daily.  OTHER feraglobulin from [place] daily.  OTHER Blood builder daily      hydroCHLOROthiazide (HYDRODIURIL) 12.5 mg tablet Take 1 Tab by mouth daily. 30 Tab 5    lisinopriL (PRINIVIL, ZESTRIL) 20 mg tablet TAKE 1 TABLET BY MOUTH EVERY DAY 90 Tab 1    metoprolol succinate (TOPROL-XL) 50 mg XL tablet Take 0.5 Tabs by mouth daily. 30 Tab 5    rosuvastatin (CRESTOR) 20 mg tablet Take 1 Tab by mouth nightly.  30 Tab 1    nitroglycerin (NITROSTAT) 0.4 mg SL tablet 1 Tab by SubLINGual route every five (5) minutes as needed for Chest Pain. Do not exceed 3 doses in 24 hours. 1 Bottle 3    ferrous sulfate 325 mg (65 mg iron) tablet Take  by mouth three (3) times daily (with meals).  cholecalciferol (VITAMIN D3) 1,000 unit cap Take 1,000 Units by mouth daily.  metFORMIN (GLUCOPHAGE) 500 mg tablet Take 500 mg by mouth four (4) times daily (with meals).  GLIPIZIDE PO Take 5 mg by mouth three (3) times daily. Past Medical History:   Diagnosis Date    Anemia     Dr Anant Main found ooze at site of Bilroth anastomosis, was on sucralfate, saw Dr Nicolas Lion CAD in native artery 11/03/2017    admit [place], RCA 99% mid, PLB prox 90% collaterals from [address] Diabetes ([facility] 11/03/2017    on metformin    Family history of coronary arteriosclerosis     Hemangioma     splenectomy-Mccormick    Hypertension, essential 12/6/2017    NSTEMI (non-ST elevated myocardial infarction) ([facility] 11/03/2017    troponin 0.67    PUD (peptic ulcer disease) 1977    Bilroth 2    S/p bare metal coronary artery stent 12/6/2017    S/P drug eluting coronary stent placement 3/25/2018    Statin intolerance 11/30/2017    declined Lipitor post BMS     Past Surgical History:   Procedure Laterality Date    HX OTHER SURGICAL      splenectomy       Review of Systems   Cardiovascular: Positive for chest pain. Musculoskeletal: Positive for myalgias. All other systems reviewed and are negative. Visit Vitals  BP (!) 154/70 (BP 1 Location: Left arm, BP Patient Position: Sitting, BP Cuff Size: Adult)   Pulse (!) 58   Resp 16   Ht 6' 1\" (1.854 m)   Wt 184 lb 9.6 oz (83.7 kg)   SpO2 98%   BMI 24.36 kg/m²       Physical Exam   Constitutional: He is oriented to person, place, and time. He appears well-nourished. HENT:   Head: Atraumatic. Eyes: Conjunctivae are normal.   Neck: Neck supple.    Cardiovascular: Normal rate, regular rhythm and normal heart sounds. Exam reveals no gallop and no friction rub. No murmur heard. Pulmonary/Chest: Breath sounds normal. He has no wheezes. He has no rales. Abdominal: Bowel sounds are normal.   Musculoskeletal:         General: No edema. Neurological: He is oriented to person, place, and time. Skin: Skin is dry. Psychiatric: His behavior is normal.   Nursing note and vitals reviewed. ASSESSMENT and PLAN  It is unclear if his high blood pressure is causing many of his symptoms. He is already on lisinopril 20 mg daily and I would like to add hydrochlorothiazide. I will give him a separate prescription for now for 12.5 mg to take once a day but if his results and improvement in his blood pressure then he will have a combination pill in the future. He is taking only 25 mg of metoprolol once a day although he is borderline bradycardic and he might benefit from stopping this medication. He needs to have blood work especially to assess his degree of anemia and I will order this today and see him back in 2 weeks time. I have told him that he may stop the Plavix for now. He may need to restart aspirin but I want to see what his hemoglobin is.

## 2021-02-16 NOTE — PROGRESS NOTES
Chief Complaint   Patient presents with    Follow-up     complaints of occasional chest discomfort with exertion/blood pressure elevation/shortness of breath. Denies dizziness/swelling.       Visit Vitals  BP (!) 154/70 (BP 1 Location: Left arm, BP Patient Position: Sitting, BP Cuff Size: Adult)   Pulse (!) 58   Resp 16   Ht 6' 1\" (1.854 m)   Wt 184 lb 9.6 oz (83.7 kg)   SpO2 98%   BMI 24.36 kg/m²

## 2021-02-17 ENCOUNTER — TELEPHONE (OUTPATIENT)
Dept: CARDIOLOGY CLINIC | Age: 76
End: 2021-02-17

## 2021-02-17 RX ORDER — GUAIFENESIN 100 MG/5ML
81 LIQUID (ML) ORAL DAILY
Qty: 90 TAB | Refills: 1
Start: 2021-02-17

## 2021-02-17 NOTE — TELEPHONE ENCOUNTER
----- Message from Carrol Villar MD sent at 2/17/2021 11:21 AM EST -----  Labs look good no longer anemic, would restart ASA 81 mg qd  ----- Message -----  From: Jaren Treviño [employer]  Sent: 2/16/2021   4:53 PM EST  To: Carrol Villar MD

## 2021-02-17 NOTE — TELEPHONE ENCOUNTER
TC to pt, ID verified. Advised patient per Dr. Leigh Elizabeth, patients labs looked good and he is no longer anemic and he can restart Aspirin 81mg daily. No further questions.

## 2021-02-22 RX ORDER — ROSUVASTATIN CALCIUM 20 MG/1
TABLET, COATED ORAL
Qty: 30 TAB | Refills: 1 | Status: SHIPPED | OUTPATIENT
Start: 2021-02-22 | End: 2021-04-16

## 2021-03-03 ENCOUNTER — OFFICE VISIT (OUTPATIENT)
Dept: CARDIOLOGY CLINIC | Age: 76
End: 2021-03-03
Payer: MEDICARE

## 2021-03-03 VITALS
HEIGHT: 73 IN | RESPIRATION RATE: 18 BRPM | HEART RATE: 58 BPM | WEIGHT: 188 LBS | DIASTOLIC BLOOD PRESSURE: 70 MMHG | BODY MASS INDEX: 24.92 KG/M2 | OXYGEN SATURATION: 95 % | SYSTOLIC BLOOD PRESSURE: 136 MMHG

## 2021-03-03 DIAGNOSIS — Z78.9 STATIN INTOLERANCE: ICD-10-CM

## 2021-03-03 DIAGNOSIS — K27.9 PUD (PEPTIC ULCER DISEASE): ICD-10-CM

## 2021-03-03 DIAGNOSIS — D50.0 IRON DEFICIENCY ANEMIA DUE TO CHRONIC BLOOD LOSS: ICD-10-CM

## 2021-03-03 DIAGNOSIS — R07.9 CHEST PAIN, UNSPECIFIED TYPE: ICD-10-CM

## 2021-03-03 DIAGNOSIS — I10 HYPERTENSION, ESSENTIAL: Primary | ICD-10-CM

## 2021-03-03 DIAGNOSIS — I25.119 CORONARY ARTERY DISEASE INVOLVING NATIVE CORONARY ARTERY OF NATIVE HEART WITH ANGINA PECTORIS (HCC): ICD-10-CM

## 2021-03-03 DIAGNOSIS — E78.00 HYPERCHOLESTEREMIA: ICD-10-CM

## 2021-03-03 DIAGNOSIS — Z95.5 S/P DRUG ELUTING CORONARY STENT PLACEMENT: ICD-10-CM

## 2021-03-03 DIAGNOSIS — E11.9 TYPE 2 DIABETES MELLITUS WITHOUT COMPLICATION, WITHOUT LONG-TERM CURRENT USE OF INSULIN (HCC): ICD-10-CM

## 2021-03-03 PROCEDURE — G8510 SCR DEP NEG, NO PLAN REQD: HCPCS | Performed by: SPECIALIST

## 2021-03-03 PROCEDURE — 99214 OFFICE O/P EST MOD 30 MIN: CPT | Performed by: SPECIALIST

## 2021-03-03 PROCEDURE — G8754 DIAS BP LESS 90: HCPCS | Performed by: SPECIALIST

## 2021-03-03 PROCEDURE — G8536 NO DOC ELDER MAL SCRN: HCPCS | Performed by: SPECIALIST

## 2021-03-03 PROCEDURE — 3046F HEMOGLOBIN A1C LEVEL >9.0%: CPT | Performed by: SPECIALIST

## 2021-03-03 PROCEDURE — 3017F COLORECTAL CA SCREEN DOC REV: CPT | Performed by: SPECIALIST

## 2021-03-03 PROCEDURE — G8420 CALC BMI NORM PARAMETERS: HCPCS | Performed by: SPECIALIST

## 2021-03-03 PROCEDURE — 1101F PT FALLS ASSESS-DOCD LE1/YR: CPT | Performed by: SPECIALIST

## 2021-03-03 PROCEDURE — G8752 SYS BP LESS 140: HCPCS | Performed by: SPECIALIST

## 2021-03-03 PROCEDURE — 2022F DILAT RTA XM EVC RTNOPTHY: CPT | Performed by: SPECIALIST

## 2021-03-03 PROCEDURE — G8427 DOCREV CUR MEDS BY ELIG CLIN: HCPCS | Performed by: SPECIALIST

## 2021-03-03 RX ORDER — BLOOD SUGAR DIAGNOSTIC
STRIP MISCELLANEOUS
COMMUNITY
Start: 2021-02-14

## 2021-03-03 RX ORDER — DOXYCYCLINE HYCLATE 100 MG
TABLET ORAL AS NEEDED
COMMUNITY
Start: 2021-02-20

## 2021-03-03 RX ORDER — LANCETS
EACH MISCELLANEOUS
COMMUNITY
Start: 2021-02-16

## 2021-03-03 NOTE — PROGRESS NOTES
HISTORY OF PRESENT ILLNESS  Lydia Walter is a 77 y.o. male. He has coronary disease status post stent implantation. He also has a history of a Billroth procedure for peptic ulcer disease and has chronic iron deficiency anemia. When I last saw him his blood pressure was higher and I suggested that he start hydrochlorothiazide 12.5 mg a day in addition to his lisinopril. He returns stating that he took the hydrochlorothiazide for only 3 days because it made him feel weak but now his blood pressure is improved. I checked blood work when he was last here and his hemoglobin was up to 12.5 which was an improvement for him. His chest pain seems to be improved and he is doing better overall. HPI  Patient Active Problem List   Diagnosis Code    PUD (peptic ulcer disease) K27.9    Family history of coronary arteriosclerosis Z82.49    Diabetes ([facility] E11.9    Hemangioma D18.00    Cataract H26.9    Anemia D64.9    Chest pain R07.9    S/p bare metal coronary artery stent Z95.5    CAD in native artery I25.10    Hypertension, essential I10    S/P drug eluting coronary stent placement Z95.5    Status post cardiac catheterization Z98.890    CAD (coronary artery disease) I25.10     Current Outpatient Medications   Medication Sig Dispense Refill    Accu-Chek Michelle Plus test strp strip USE AS DIRECTED TO TEST 3 TIMES PER DAY      Accu-Chek Softclix Lancets misc TEST 3 TIMES A DAY      doxycycline (VIBRA-TABS) 100 mg tablet as needed.  rosuvastatin (CRESTOR) 20 mg tablet TAKE 1 TABLET BY MOUTH EVERY DAY AT NIGHT 30 Tab 1    aspirin 81 mg chewable tablet Take 1 Tab by mouth daily. 90 Tab 1    omeprazole (PRILOSEC) 20 mg capsule Take 20 mg by mouth daily.  OTHER feraglobulin from [place] daily.  OTHER Blood builder daily      lisinopriL (PRINIVIL, ZESTRIL) 20 mg tablet TAKE 1 TABLET BY MOUTH EVERY DAY 90 Tab 1    metoprolol succinate (TOPROL-XL) 50 mg XL tablet Take 0.5 Tabs by mouth daily.  27 Tab 5    nitroglycerin (NITROSTAT) 0.4 mg SL tablet 1 Tab by SubLINGual route every five (5) minutes as needed for Chest Pain. Do not exceed 3 doses in 24 hours. 1 Bottle 3    ferrous sulfate 325 mg (65 mg iron) tablet Take  by mouth three (3) times daily (with meals).  cholecalciferol (VITAMIN D3) 1,000 unit cap Take 1,000 Units by mouth daily.  metFORMIN (GLUCOPHAGE) 500 mg tablet Take 500 mg by mouth four (4) times daily (with meals).  GLIPIZIDE PO Take 5 mg by mouth three (3) times daily.  hydroCHLOROthiazide (HYDRODIURIL) 12.5 mg tablet Take 1 Tab by mouth daily. 27 Tab 5     Past Medical History:   Diagnosis Date    Anemia     Dr Jason El found ooze at site of Bilroth anastomosis, was on sucralfate, saw Dr Carlota Aaron CAD in native artery 11/03/2017    admit [place], RCA 99% mid, PLB prox 90% collaterals from [address] Diabetes ([facility] 11/03/2017    on metformin    Family history of coronary arteriosclerosis     Hemangioma     splenectomy-Mccormick    Hypertension, essential 12/6/2017    NSTEMI (non-ST elevated myocardial infarction) ([facility] 11/03/2017    troponin 0.67    PUD (peptic ulcer disease) 1977    Bilroth 2    S/p bare metal coronary artery stent 12/6/2017    S/P drug eluting coronary stent placement 3/25/2018    Statin intolerance 11/30/2017    declined Lipitor post BMS     Past Surgical History:   Procedure Laterality Date    HX OTHER SURGICAL      splenectomy       Review of Systems   Constitutional: Positive for malaise/fatigue. Neurological: Positive for weakness. All other systems reviewed and are negative. Visit Vitals  /70 (BP 1 Location: Left upper arm, BP Patient Position: Sitting, BP Cuff Size: Adult)   Pulse (!) 58   Resp 18   Ht 6' 1\" (1.854 m)   Wt 188 lb (85.3 kg)   SpO2 95%   BMI 24.80 kg/m²       Physical Exam   Constitutional: He is oriented to person, place, and time. He appears well-nourished. HENT:   Head: Atraumatic.    Eyes: Conjunctivae are normal.   Neck: Neck supple. Cardiovascular: Normal rate, regular rhythm and normal heart sounds. Exam reveals no gallop and no friction rub. No murmur heard. Pulmonary/Chest: Breath sounds normal. He has no wheezes. He has no rales. Abdominal: Bowel sounds are normal.   Musculoskeletal:         General: No edema. Neurological: He is oriented to person, place, and time. Skin: Skin is dry. Psychiatric: His behavior is normal.   Nursing note and vitals reviewed. ASSESSMENT and PLAN  Overall he is doing better with better control of his blood pressure and his hemoglobin is improved. I have told him that he may stay off the hydrochlorothiazide but also use it as needed should his blood pressure go higher again. It is definitely improved in the office today. I will plan to see him back in 6 months.

## 2021-04-16 RX ORDER — ROSUVASTATIN CALCIUM 20 MG/1
TABLET, COATED ORAL
Qty: 30 TAB | Refills: 1 | Status: SHIPPED | OUTPATIENT
Start: 2021-04-16 | End: 2021-06-15

## 2021-05-24 RX ORDER — LISINOPRIL 20 MG/1
TABLET ORAL
Qty: 90 TABLET | Refills: 1 | Status: SHIPPED | OUTPATIENT
Start: 2021-05-24 | End: 2022-01-27 | Stop reason: SDUPTHER

## 2021-06-15 RX ORDER — ROSUVASTATIN CALCIUM 20 MG/1
TABLET, COATED ORAL
Qty: 30 TABLET | Refills: 1 | Status: SHIPPED | OUTPATIENT
Start: 2021-06-15

## 2021-07-19 DIAGNOSIS — I10 HYPERTENSION, ESSENTIAL: Primary | ICD-10-CM

## 2021-07-19 RX ORDER — METOPROLOL SUCCINATE 50 MG/1
TABLET, EXTENDED RELEASE ORAL
Qty: 45 TABLET | Refills: 3 | Status: SHIPPED | OUTPATIENT
Start: 2021-07-19

## 2021-07-19 NOTE — TELEPHONE ENCOUNTER
Requested Prescriptions     Signed Prescriptions Disp Refills    metoprolol succinate (TOPROL-XL) 50 mg XL tablet 45 Tablet 3     Sig: TAKE 1/2 TABLET BY MOUTH EVERY DAY     Authorizing Provider: Richard Cordero     Ordering User: Vincenzo Armstrong    Per Dr. Mahamed Ruggiero verbal order

## 2021-08-03 PROBLEM — I25.10 CAD (CORONARY ARTERY DISEASE): Status: RESOLVED | Noted: 2020-07-29 | Resolved: 2021-08-03

## 2021-09-16 ENCOUNTER — OFFICE VISIT (OUTPATIENT)
Dept: CARDIOLOGY CLINIC | Age: 76
End: 2021-09-16
Payer: MEDICARE

## 2021-09-16 VITALS
BODY MASS INDEX: 24.39 KG/M2 | HEART RATE: 57 BPM | SYSTOLIC BLOOD PRESSURE: 130 MMHG | DIASTOLIC BLOOD PRESSURE: 70 MMHG | HEIGHT: 73 IN | WEIGHT: 184 LBS | OXYGEN SATURATION: 98 % | RESPIRATION RATE: 18 BRPM

## 2021-09-16 DIAGNOSIS — K27.9 PUD (PEPTIC ULCER DISEASE): ICD-10-CM

## 2021-09-16 DIAGNOSIS — Z95.5 S/P DRUG ELUTING CORONARY STENT PLACEMENT: ICD-10-CM

## 2021-09-16 DIAGNOSIS — I25.119 CORONARY ARTERY DISEASE INVOLVING NATIVE CORONARY ARTERY OF NATIVE HEART WITH ANGINA PECTORIS (HCC): ICD-10-CM

## 2021-09-16 DIAGNOSIS — D50.0 IRON DEFICIENCY ANEMIA DUE TO CHRONIC BLOOD LOSS: ICD-10-CM

## 2021-09-16 DIAGNOSIS — E11.9 TYPE 2 DIABETES MELLITUS WITHOUT COMPLICATION, WITHOUT LONG-TERM CURRENT USE OF INSULIN (HCC): ICD-10-CM

## 2021-09-16 DIAGNOSIS — R07.9 CHEST PAIN, UNSPECIFIED TYPE: Primary | ICD-10-CM

## 2021-09-16 DIAGNOSIS — I10 HYPERTENSION, ESSENTIAL: ICD-10-CM

## 2021-09-16 DIAGNOSIS — Z78.9 STATIN INTOLERANCE: ICD-10-CM

## 2021-09-16 PROCEDURE — G8536 NO DOC ELDER MAL SCRN: HCPCS | Performed by: SPECIALIST

## 2021-09-16 PROCEDURE — G8420 CALC BMI NORM PARAMETERS: HCPCS | Performed by: SPECIALIST

## 2021-09-16 PROCEDURE — 2022F DILAT RTA XM EVC RTNOPTHY: CPT | Performed by: SPECIALIST

## 2021-09-16 PROCEDURE — 3017F COLORECTAL CA SCREEN DOC REV: CPT | Performed by: SPECIALIST

## 2021-09-16 PROCEDURE — 3046F HEMOGLOBIN A1C LEVEL >9.0%: CPT | Performed by: SPECIALIST

## 2021-09-16 PROCEDURE — G8510 SCR DEP NEG, NO PLAN REQD: HCPCS | Performed by: SPECIALIST

## 2021-09-16 PROCEDURE — 99215 OFFICE O/P EST HI 40 MIN: CPT | Performed by: SPECIALIST

## 2021-09-16 PROCEDURE — 1101F PT FALLS ASSESS-DOCD LE1/YR: CPT | Performed by: SPECIALIST

## 2021-09-16 PROCEDURE — G8754 DIAS BP LESS 90: HCPCS | Performed by: SPECIALIST

## 2021-09-16 PROCEDURE — G8752 SYS BP LESS 140: HCPCS | Performed by: SPECIALIST

## 2021-09-16 PROCEDURE — G8427 DOCREV CUR MEDS BY ELIG CLIN: HCPCS | Performed by: SPECIALIST

## 2021-09-16 RX ORDER — TAMSULOSIN HYDROCHLORIDE 0.4 MG/1
CAPSULE ORAL
COMMUNITY
Start: 2021-08-11

## 2021-09-16 RX ORDER — DICLOFENAC SODIUM 10 MG/G
GEL TOPICAL
COMMUNITY
Start: 2021-08-29

## 2021-09-16 NOTE — PROGRESS NOTES
HISTORY OF PRESENT ILLNESS  Yulisa Hays is a 77 y.o. male. He has a history of coronary disease with stent to his right coronary artery that was underexpanded and last treated about a year ago with balloon expansion up to 24 sayda but still an incomplete result. He had more chest discomfort and a stress test done in September of last year was unremarkable. He has a history of a Billroth II procedure and marginal ulcer and iron deficiency anemia although his last blood work done in February of this year showed that his anemia was resolved. He walks every day and usually does well although if he walks a hill sometimes he will have chest discomfort but not always he occasionally uses a nitroglycerin. His weight is down 4 pounds. HPI  Patient Active Problem List   Diagnosis Code    PUD (peptic ulcer disease) K27.9    Family history of coronary arteriosclerosis Z82.49    Diabetes ([facility] [address] E11.9    Hemangioma D18.00    Cataract H26.9    Anemia D64.9    Chest pain R07.9    S/p bare metal coronary artery stent Z95.5    CAD in native artery I25.10    Hypertension, essential I10    S/P drug eluting coronary stent placement Z95.5    Status post cardiac catheterization Z98.890     Current Outpatient Medications   Medication Sig Dispense Refill    diclofenac (VOLTAREN) 1 % gel APPLY TO AFFECTED AREA 4 TIMES A DAY AS NEEDED      tamsulosin (FLOMAX) 0.4 mg capsule TAKE 1 CAPSULE BY MOUTH EVERY NIGHT AS DIRECTED AT BEDTIME      metoprolol succinate (TOPROL-XL) 50 mg XL tablet TAKE 1/2 TABLET BY MOUTH EVERY DAY 45 Tablet 3    rosuvastatin (CRESTOR) 20 mg tablet TAKE 1 TABLET BY MOUTH EVERY DAY AT NIGHT 30 Tablet 1    lisinopriL (PRINIVIL, ZESTRIL) 20 mg tablet TAKE 1 TABLET BY MOUTH EVERY DAY 90 Tablet 1    Accu-Chek Michelle Plus test strp strip USE AS DIRECTED TO TEST 3 TIMES PER DAY      Accu-Chek Softclix Lancets misc TEST 3 TIMES A DAY      aspirin 81 mg chewable tablet Take 1 Tab by mouth daily.  90 Tab 1    omeprazole (PRILOSEC) 20 mg capsule Take 20 mg by mouth daily.  OTHER feraglobulin from [place] daily.  OTHER Blood builder daily      nitroglycerin (NITROSTAT) 0.4 mg SL tablet 1 Tab by SubLINGual route every five (5) minutes as needed for Chest Pain. Do not exceed 3 doses in 24 hours. 1 Bottle 3    ferrous sulfate 325 mg (65 mg iron) tablet Take  by mouth three (3) times daily (with meals).  cholecalciferol (VITAMIN D3) 1,000 unit cap Take 1,000 Units by mouth daily.  metFORMIN (GLUCOPHAGE) 500 mg tablet Take 500 mg by mouth four (4) times daily (with meals).  GLIPIZIDE PO Take 5 mg by mouth three (3) times daily.  doxycycline (VIBRA-TABS) 100 mg tablet as needed. (Patient not taking: Reported on 9/16/2021)      hydroCHLOROthiazide (HYDRODIURIL) 12.5 mg tablet Take 1 Tab by mouth daily. (Patient not taking: Reported on 9/16/2021) 30 Tab 5     Past Medical History:   Diagnosis Date    Anemia     Dr Lonny Johnson found ooze at site of Bilroth anastomosis, was on sucralfate, saw Dr Eric Patel CAD in native artery 11/03/2017    admit [place], RCA 99% mid, PLB prox 90% collaterals from [address] Diabetes ([facility] [address] 11/03/2017    on metformin    Family history of coronary arteriosclerosis     Hemangioma     splenectomy-Mccormick    Hypertension, essential 12/6/2017    NSTEMI (non-ST elevated myocardial infarction) ([facility] [address] 11/03/2017    troponin 0.67    PUD (peptic ulcer disease) 1977    Bilroth 2    S/p bare metal coronary artery stent 12/6/2017    S/P drug eluting coronary stent placement 3/25/2018    Statin intolerance 11/30/2017    declined Lipitor post BMS     Past Surgical History:   Procedure Laterality Date    HX OTHER SURGICAL      splenectomy       Review of Systems   Constitutional: Positive for weight loss. Cardiovascular: Positive for chest pain. All other systems reviewed and are negative.     Visit Vitals  /70 (BP 1 Location: Right arm, BP Patient Position: Sitting, BP Cuff Size: Adult)   Pulse (!) 57   Resp 18   Ht 6' 1\" (1.854 m)   Wt 184 lb (83.5 kg)   SpO2 98%   BMI 24.28 kg/m²       Physical Exam  Vitals and nursing note reviewed. Constitutional:       Appearance: Normal appearance. HENT:      Head: Normocephalic. Right Ear: External ear normal.      Left Ear: External ear normal.      Nose: Nose normal.      Mouth/Throat:      Mouth: Mucous membranes are moist.   Eyes:      Extraocular Movements: Extraocular movements intact. Cardiovascular:      Rate and Rhythm: Normal rate and regular rhythm. Heart sounds: Normal heart sounds. Pulmonary:      Breath sounds: Normal breath sounds. Abdominal:      Palpations: Abdomen is soft. Musculoskeletal:         General: Normal range of motion. Cervical back: Normal range of motion. Skin:     General: Skin is warm. Neurological:      General: No focal deficit present. Mental Status: He is alert. Psychiatric:         Mood and Affect: Mood normal.         ASSESSMENT and PLAN  It is unclear if he had residual underexpansion of his stent during his last procedure but it does sound like this was the case. However it has been over a year which would suggest that things would have been adequately dilated at the time. It is concerning that he occasionally has to use nitroglycerin. I believe that another stress echocardiogram is indicated since it has been a year. He also needs to have blood work especially with regards to his anemia and I will order this today. Whether or not to proceed with repeat catheterization can be decided after the stress test results are known.

## 2021-09-17 ENCOUNTER — TELEPHONE (OUTPATIENT)
Dept: CARDIOLOGY CLINIC | Age: 76
End: 2021-09-17

## 2021-09-17 NOTE — TELEPHONE ENCOUNTER
Patient informed of results by mail and mychart message    Future Appointments   Date Time Provider Shavon Avila   10/13/2021  9:00 AM JULIUS MARTINEZ   10/13/2021  9:00 AM JULIUS BLEVINS AMB

## 2021-09-17 NOTE — TELEPHONE ENCOUNTER
----- Message from Noah Clement MD sent at 9/17/2021 12:31 PM EDT -----  Labs OK only mildly anemic  ----- Message -----  From: Jaren Treviño In [employer]  Sent: 9/16/2021   7:02 PM EDT  To: Noah Clement MD

## 2021-10-13 ENCOUNTER — APPOINTMENT (OUTPATIENT)
Dept: CARDIOLOGY CLINIC | Age: 76
End: 2021-10-13

## 2021-10-13 ENCOUNTER — ANCILLARY PROCEDURE (OUTPATIENT)
Dept: CARDIOLOGY CLINIC | Age: 76
End: 2021-10-13
Payer: MEDICARE

## 2021-10-13 VITALS — WEIGHT: 184 LBS | BODY MASS INDEX: 24.39 KG/M2 | HEIGHT: 73 IN

## 2021-10-13 PROCEDURE — 93351 STRESS TTE COMPLETE: CPT | Performed by: SPECIALIST

## 2021-10-14 ENCOUNTER — TELEPHONE (OUTPATIENT)
Dept: CARDIOLOGY CLINIC | Age: 76
End: 2021-10-14

## 2021-10-14 LAB
STRESS ANGINA INDEX: 0
STRESS BASELINE DIAS BP: 76 MMHG
STRESS BASELINE HR: 60 BPM
STRESS BASELINE SYS BP: 124 MMHG
STRESS ESTIMATED WORKLOAD: 10.1 METS
STRESS EXERCISE DUR MIN: NORMAL
STRESS O2 SAT PEAK: 99 %
STRESS O2 SAT REST: 98 %
STRESS PEAK DIAS BP: 84 MMHG
STRESS PEAK SYS BP: 162 MMHG
STRESS PERCENT HR ACHIEVED: 105 %
STRESS POST PEAK HR: 153 BPM
STRESS RATE PRESSURE PRODUCT: NORMAL BPM*MMHG
STRESS TARGET HR: 146 BPM

## 2022-01-27 DIAGNOSIS — I10 HYPERTENSION, ESSENTIAL: Primary | ICD-10-CM

## 2022-01-27 RX ORDER — LISINOPRIL 20 MG/1
20 TABLET ORAL DAILY
Qty: 30 TABLET | Refills: 0 | Status: SHIPPED | OUTPATIENT
Start: 2022-01-27

## 2022-01-27 NOTE — TELEPHONE ENCOUNTER
Requested Prescriptions     Signed Prescriptions Disp Refills    lisinopriL (PRINIVIL, ZESTRIL) 20 mg tablet 30 Tablet 0     Sig: Take 1 Tablet by mouth daily.  *NEED TO SCHEDULE VIRTUAL OR OFFICE VISIT FOLLOW UP FOR FURTHER REFILLS*     Authorizing Provider: Jessica Perkins     Ordering User: Skip Edmondson    Per Dr. Onesimo Teresa verbal order

## 2022-03-18 PROBLEM — Z98.890 STATUS POST CARDIAC CATHETERIZATION: Status: ACTIVE | Noted: 2020-07-29

## 2022-03-18 PROBLEM — R07.9 CHEST PAIN: Status: ACTIVE | Noted: 2017-11-03

## 2022-03-19 PROBLEM — E11.9 DIABETES (HCC): Status: ACTIVE | Noted: 2017-11-03

## 2022-03-19 PROBLEM — Z95.5 S/P BARE METAL CORONARY ARTERY STENT: Status: ACTIVE | Noted: 2017-12-06

## 2022-03-19 PROBLEM — Z95.5 S/P DRUG ELUTING CORONARY STENT PLACEMENT: Status: ACTIVE | Noted: 2018-03-25

## 2022-03-19 PROBLEM — I10 HYPERTENSION, ESSENTIAL: Status: ACTIVE | Noted: 2017-12-06

## 2022-03-20 PROBLEM — I25.10 CAD IN NATIVE ARTERY: Status: ACTIVE | Noted: 2017-11-03

## 2022-11-11 ENCOUNTER — OFFICE VISIT (OUTPATIENT)
Dept: CARDIOLOGY CLINIC | Age: 77
End: 2022-11-11
Payer: MEDICARE

## 2022-11-11 VITALS
HEART RATE: 64 BPM | HEIGHT: 73 IN | SYSTOLIC BLOOD PRESSURE: 138 MMHG | DIASTOLIC BLOOD PRESSURE: 66 MMHG | BODY MASS INDEX: 25.58 KG/M2 | RESPIRATION RATE: 14 BRPM | WEIGHT: 193 LBS | OXYGEN SATURATION: 97 %

## 2022-11-11 DIAGNOSIS — E11.9 TYPE 2 DIABETES MELLITUS WITHOUT COMPLICATION, WITHOUT LONG-TERM CURRENT USE OF INSULIN (HCC): ICD-10-CM

## 2022-11-11 DIAGNOSIS — Z78.9 STATIN INTOLERANCE: ICD-10-CM

## 2022-11-11 DIAGNOSIS — I10 HYPERTENSION, ESSENTIAL: Primary | ICD-10-CM

## 2022-11-11 DIAGNOSIS — D50.0 IRON DEFICIENCY ANEMIA DUE TO CHRONIC BLOOD LOSS: ICD-10-CM

## 2022-11-11 DIAGNOSIS — I25.119 CORONARY ARTERY DISEASE INVOLVING NATIVE CORONARY ARTERY OF NATIVE HEART WITH ANGINA PECTORIS (HCC): ICD-10-CM

## 2022-11-11 DIAGNOSIS — R07.9 CHEST PAIN, UNSPECIFIED TYPE: ICD-10-CM

## 2022-11-11 DIAGNOSIS — K27.9 PUD (PEPTIC ULCER DISEASE): ICD-10-CM

## 2022-11-11 DIAGNOSIS — Z95.5 S/P DRUG ELUTING CORONARY STENT PLACEMENT: ICD-10-CM

## 2022-11-11 PROCEDURE — 1123F ACP DISCUSS/DSCN MKR DOCD: CPT | Performed by: SPECIALIST

## 2022-11-11 PROCEDURE — G8417 CALC BMI ABV UP PARAM F/U: HCPCS | Performed by: SPECIALIST

## 2022-11-11 PROCEDURE — G8536 NO DOC ELDER MAL SCRN: HCPCS | Performed by: SPECIALIST

## 2022-11-11 PROCEDURE — G8752 SYS BP LESS 140: HCPCS | Performed by: SPECIALIST

## 2022-11-11 PROCEDURE — G8510 SCR DEP NEG, NO PLAN REQD: HCPCS | Performed by: SPECIALIST

## 2022-11-11 PROCEDURE — 99214 OFFICE O/P EST MOD 30 MIN: CPT | Performed by: SPECIALIST

## 2022-11-11 PROCEDURE — G8427 DOCREV CUR MEDS BY ELIG CLIN: HCPCS | Performed by: SPECIALIST

## 2022-11-11 PROCEDURE — 1101F PT FALLS ASSESS-DOCD LE1/YR: CPT | Performed by: SPECIALIST

## 2022-11-11 PROCEDURE — 3074F SYST BP LT 130 MM HG: CPT | Performed by: SPECIALIST

## 2022-11-11 PROCEDURE — 3078F DIAST BP <80 MM HG: CPT | Performed by: SPECIALIST

## 2022-11-11 PROCEDURE — G8754 DIAS BP LESS 90: HCPCS | Performed by: SPECIALIST

## 2022-11-11 RX ORDER — AMLODIPINE BESYLATE 2.5 MG/1
2.5 TABLET ORAL DAILY
Qty: 30 TABLET | Refills: 11 | Status: SHIPPED | OUTPATIENT
Start: 2022-11-11

## 2022-11-11 NOTE — PROGRESS NOTES
HISTORY OF PRESENT ILLNESS  Radha Spring is a 69 y.o. male. He has coronary disease with a history of a stent in the right coronary artery that is underexpanded. However he has been doing better recently with regards to chest pain although he still has it occasionally. He does a lot of yard work without difficulty and in general he is improved since his last heart catheterization done in July 2020. He has diabetes and has been started on insulin and his weight is up 9 pounds. Apparently his hemoglobin A1c was 9.8. He had a negative stress echocardiogram done 1 year ago. His blood pressure has been higher recently between 558 and 919 systolic although it is up and down and is 457 systolic in the office today. He is already taking lisinopril 20 mg as well as hydrochlorothiazide and low-dose metoprolol. He has a history of a Billroth II procedure done in [facility] and has had marginal ulcers with bleeding in the past.  HPI  Patient Active Problem List   Diagnosis Code    PUD (peptic ulcer disease) K27.9    Family history of coronary arteriosclerosis Z82.49    Diabetes ([facility] [address] E11.9    Hemangioma D18.00    Cataract H26.9    Anemia D64.9    Chest pain R07.9    S/p bare metal coronary artery stent Z95.5    CAD in native artery I25.10    Hypertension, essential I10    S/P drug eluting coronary stent placement Z95.5    Status post cardiac catheterization Z98.890     Current Outpatient Medications   Medication Sig Dispense Refill    amLODIPine (NORVASC) 2.5 mg tablet Take 1 Tablet by mouth daily. 30 Tablet 11    lisinopriL (PRINIVIL, ZESTRIL) 20 mg tablet Take 1 Tablet by mouth daily.  *NEED TO SCHEDULE VIRTUAL OR OFFICE VISIT FOLLOW UP FOR FURTHER REFILLS* (Patient taking differently: Take 40 mg by mouth daily.) 30 Tablet 0    diclofenac (VOLTAREN) 1 % gel APPLY TO AFFECTED AREA 4 TIMES A DAY AS NEEDED      tamsulosin (FLOMAX) 0.4 mg capsule TAKE 1 CAPSULE BY MOUTH EVERY NIGHT AS DIRECTED AT BEDTIME      metoprolol succinate (TOPROL-XL) 50 mg XL tablet TAKE 1/2 TABLET BY MOUTH EVERY DAY 45 Tablet 3    rosuvastatin (CRESTOR) 20 mg tablet TAKE 1 TABLET BY MOUTH EVERY DAY AT NIGHT 30 Tablet 1    Accu-Chek Michelle Plus test strp strip USE AS DIRECTED TO TEST 3 TIMES PER DAY      Accu-Chek Softclix Lancets misc TEST 3 TIMES A DAY      aspirin 81 mg chewable tablet Take 1 Tab by mouth daily. 90 Tab 1    omeprazole (PRILOSEC) 20 mg capsule Take 20 mg by mouth daily. OTHER feraglobulin from [employer] daily. OTHER Blood builder daily      nitroglycerin (NITROSTAT) 0.4 mg SL tablet 1 Tab by SubLINGual route every five (5) minutes as needed for Chest Pain. Do not exceed 3 doses in 24 hours. 1 Bottle 3    ferrous sulfate 325 mg (65 mg iron) tablet Take  by mouth three (3) times daily (with meals). cholecalciferol (VITAMIN D3) 25 mcg (1,000 unit) cap Take 1,000 Units by mouth daily. metFORMIN (GLUCOPHAGE) 500 mg tablet Take 500 mg by mouth four (4) times daily (with meals). GLIPIZIDE PO Take 5 mg by mouth three (3) times daily. doxycycline (VIBRA-TABS) 100 mg tablet as needed. (Patient not taking: No sig reported)      hydroCHLOROthiazide (HYDRODIURIL) 12.5 mg tablet Take 1 Tab by mouth daily.  (Patient not taking: No sig reported) 30 Tab 5     Past Medical History:   Diagnosis Date    Anemia     Dr Russ French found ooze at site of Bilroth anastomosis, was on sucralfate, saw Dr Julia Metcalf    CAD in native artery 11/03/2017    admit [place], RCA 99% mid, PLB prox 90% collaterals from Circ    Cataract     Diabetes ([facility] [address] 11/03/2017    on metformin    Family history of coronary arteriosclerosis     Hemangioma     splenectomy-Mccormick    Hypertension, essential 12/6/2017    NSTEMI (non-ST elevated myocardial infarction) ([facility] [address] 11/03/2017    troponin 0.67    PUD (peptic ulcer disease) 1977    Bilroth 2    S/p bare metal coronary artery stent 12/6/2017    S/P drug eluting coronary stent placement 3/25/2018    Statin intolerance 11/30/2017    declined Lipitor post BMS     Past Surgical History:   Procedure Laterality Date    HX OTHER SURGICAL      splenectomy       Review of Systems   Cardiovascular:  Positive for chest pain. All other systems reviewed and are negative. Visit Vitals  /66 (BP 1 Location: Right arm, BP Patient Position: Sitting, BP Cuff Size: Adult)   Pulse 64   Resp 14   Ht 6' 1\" (1.854 m)   Wt 193 lb (87.5 kg)   SpO2 97%   BMI 25.46 kg/m²       Physical Exam  Vitals and nursing note reviewed. Constitutional:       Appearance: Normal appearance. HENT:      Head: Normocephalic. Right Ear: External ear normal.      Left Ear: External ear normal.      Nose: Nose normal.      Mouth/Throat:      Mouth: Mucous membranes are moist.   Eyes:      Extraocular Movements: Extraocular movements intact. Cardiovascular:      Rate and Rhythm: Normal rate and regular rhythm. Heart sounds: Normal heart sounds. Pulmonary:      Breath sounds: Normal breath sounds. Abdominal:      Palpations: Abdomen is soft. Musculoskeletal:         General: Normal range of motion. Cervical back: Normal range of motion. Skin:     General: Skin is warm. Neurological:      Mental Status: He is alert and oriented to person, place, and time. Psychiatric:         Behavior: Behavior normal.       ASSESSMENT and PLAN  He seems to be doing better from a cardiac standpoint. His blood pressure seems to be running somewhat higher especially with the weight gain. I believe that he would benefit from further reduction given his diabetes and I believe another medication which should be added to accomplish this. I will therefore start him on amlodipine 2.5 mg a day in addition to his other regimen and we will see him back in 4 weeks.

## 2022-12-15 ENCOUNTER — OFFICE VISIT (OUTPATIENT)
Dept: CARDIOLOGY CLINIC | Age: 77
End: 2022-12-15
Payer: MEDICARE

## 2022-12-15 VITALS
RESPIRATION RATE: 16 BRPM | HEART RATE: 66 BPM | HEIGHT: 73 IN | WEIGHT: 196 LBS | OXYGEN SATURATION: 98 % | BODY MASS INDEX: 25.98 KG/M2 | DIASTOLIC BLOOD PRESSURE: 60 MMHG | SYSTOLIC BLOOD PRESSURE: 102 MMHG

## 2022-12-15 DIAGNOSIS — Z95.5 S/P DRUG ELUTING CORONARY STENT PLACEMENT: ICD-10-CM

## 2022-12-15 DIAGNOSIS — R07.9 CHEST PAIN, UNSPECIFIED TYPE: ICD-10-CM

## 2022-12-15 DIAGNOSIS — E11.9 TYPE 2 DIABETES MELLITUS WITHOUT COMPLICATION, WITHOUT LONG-TERM CURRENT USE OF INSULIN (HCC): ICD-10-CM

## 2022-12-15 DIAGNOSIS — E78.00 HYPERCHOLESTEREMIA: ICD-10-CM

## 2022-12-15 DIAGNOSIS — Z78.9 STATIN INTOLERANCE: ICD-10-CM

## 2022-12-15 DIAGNOSIS — D50.0 IRON DEFICIENCY ANEMIA DUE TO CHRONIC BLOOD LOSS: ICD-10-CM

## 2022-12-15 DIAGNOSIS — K27.9 PUD (PEPTIC ULCER DISEASE): ICD-10-CM

## 2022-12-15 DIAGNOSIS — I25.119 CORONARY ARTERY DISEASE INVOLVING NATIVE CORONARY ARTERY OF NATIVE HEART WITH ANGINA PECTORIS (HCC): Primary | ICD-10-CM

## 2022-12-15 RX ORDER — INSULIN LISPRO 100 [IU]/ML
INJECTION, SUSPENSION SUBCUTANEOUS
COMMUNITY
Start: 2022-11-21

## 2022-12-15 RX ORDER — AMLODIPINE BESYLATE 2.5 MG/1
2.5 TABLET ORAL DAILY
Qty: 90 TABLET | Refills: 3 | Status: SHIPPED | OUTPATIENT
Start: 2022-12-15

## 2022-12-15 RX ORDER — ASCORBIC ACID 500 MG
TABLET ORAL
COMMUNITY

## 2022-12-15 NOTE — PROGRESS NOTES
HISTORY OF PRESENT ILLNESS  Rasta Be is a 69 y.o. male. He has a history of coronary disease and gastrointestinal bleeding. He has had stents and his most recent catheterization showed underexpansion of the stent in his right coronary artery. He has been doing better and rarely has chest pain. He remains quite active. His blood pressure was higher when I last saw him and I added amlodipine 2.5 mg a day to his regimen. His blood pressure can be up and down at home but I took it in the office today it was 120/60. HPI  Patient Active Problem List   Diagnosis Code    PUD (peptic ulcer disease) K27.9    Family history of coronary arteriosclerosis Z82.49    Diabetes ([facility] [address] E11.9    Hemangioma D18.00    Cataract H26.9    Anemia D64.9    Chest pain R07.9    S/p bare metal coronary artery stent Z95.5    CAD in native artery I25.10    Hypertension, essential I10    S/P drug eluting coronary stent placement Z95.5    Status post cardiac catheterization Z98.890     Current Outpatient Medications   Medication Sig Dispense Refill    HumaLOG Mix 75-25 KwikPen flexpen INJECT 20 - 24 UNITS AST BREAKFAST AND 16- 20 UNITS AT SUPPER DAILY      ascorbic acid, vitamin C, (Vitamin C) 500 mg tablet Take  by mouth. amLODIPine (NORVASC) 2.5 mg tablet Take 1 Tablet by mouth daily. 90 Tablet 3    lisinopriL (PRINIVIL, ZESTRIL) 20 mg tablet Take 1 Tablet by mouth daily.  *NEED TO SCHEDULE VIRTUAL OR OFFICE VISIT FOLLOW UP FOR FURTHER REFILLS* (Patient taking differently: Take 40 mg by mouth daily.) 30 Tablet 0    diclofenac (VOLTAREN) 1 % gel APPLY TO AFFECTED AREA 4 TIMES A DAY AS NEEDED      tamsulosin (FLOMAX) 0.4 mg capsule TAKE 1 CAPSULE BY MOUTH EVERY NIGHT AS DIRECTED AT BEDTIME      metoprolol succinate (TOPROL-XL) 50 mg XL tablet TAKE 1/2 TABLET BY MOUTH EVERY DAY 45 Tablet 3    rosuvastatin (CRESTOR) 20 mg tablet TAKE 1 TABLET BY MOUTH EVERY DAY AT NIGHT 30 Tablet 1    Accu-Chek Michelle Plus test strp strip USE AS DIRECTED TO TEST 3 TIMES PER DAY      Accu-Chek Softclix Lancets misc TEST 3 TIMES A DAY      aspirin 81 mg chewable tablet Take 1 Tab by mouth daily. 90 Tab 1    omeprazole (PRILOSEC) 20 mg capsule Take 20 mg by mouth daily. OTHER feraglobulin from [place] daily. OTHER Blood builder daily      nitroglycerin (NITROSTAT) 0.4 mg SL tablet 1 Tab by SubLINGual route every five (5) minutes as needed for Chest Pain. Do not exceed 3 doses in 24 hours. 1 Bottle 3    ferrous sulfate 325 mg (65 mg iron) tablet Take  by mouth three (3) times daily (with meals). cholecalciferol (VITAMIN D3) 25 mcg (1,000 unit) cap Take 1,000 Units by mouth daily. metFORMIN (GLUCOPHAGE) 500 mg tablet Take 500 mg by mouth four (4) times daily (with meals). GLIPIZIDE PO Take 5 mg by mouth three (3) times daily. doxycycline (VIBRA-TABS) 100 mg tablet as needed. (Patient not taking: No sig reported)      hydroCHLOROthiazide (HYDRODIURIL) 12.5 mg tablet Take 1 Tab by mouth daily. 27 Tab 5     Past Medical History:   Diagnosis Date    Anemia     Dr Ester Gage found ooze at site of Bilroth anastomosis, was on sucralfate, saw Dr Tatiana Avila    CAD in native artery 11/03/2017    admit [place], RCA 99% mid, PLB prox 90% collaterals from Circ    Cataract     Diabetes ([facility] [address] 11/03/2017    on metformin    Family history of coronary arteriosclerosis     Hemangioma     splenectomy-Mccormick    Hypertension, essential 12/6/2017    NSTEMI (non-ST elevated myocardial infarction) ([address] 11/03/2017    troponin 0.67    PUD (peptic ulcer disease) 1977    Bilroth 2    S/p bare metal coronary artery stent 12/6/2017    S/P drug eluting coronary stent placement 3/25/2018    Statin intolerance 11/30/2017    declined Lipitor post BMS     Past Surgical History:   Procedure Laterality Date    HX OTHER SURGICAL      splenectomy       Review of Systems   Cardiovascular:  Positive for chest pain. All other systems reviewed and are negative.   Visit Vitals  BP 102/60 (BP 1 Location: Left upper arm, BP Patient Position: Sitting, BP Cuff Size: Large adult)   Pulse 66   Resp 16   Ht 6' 1\" (1.854 m)   Wt 196 lb (88.9 kg)   SpO2 98%   BMI 25.86 kg/m²       Physical Exam  Vitals and nursing note reviewed. Constitutional:       Appearance: Normal appearance. HENT:      Head: Normocephalic. Right Ear: External ear normal.      Nose: Nose normal.      Mouth/Throat:      Mouth: Mucous membranes are moist.   Eyes:      Extraocular Movements: Extraocular movements intact. Cardiovascular:      Rate and Rhythm: Normal rate and regular rhythm. Heart sounds: Normal heart sounds. Pulmonary:      Breath sounds: Normal breath sounds. Abdominal:      Palpations: Abdomen is soft. Musculoskeletal:         General: Normal range of motion. Cervical back: Normal range of motion. Skin:     General: Skin is warm. Neurological:      Mental Status: He is alert and oriented to person, place, and time. Psychiatric:         Behavior: Behavior normal.       ASSESSMENT and PLAN  He seems to be doing better. He rarely has chest pain and is unclear whether it really represents angina. He basically does what he wants with regards to exercise and yard work. I believe his blood pressure is better controlled and I will continue this regimen including the amlodipine low-dose and plan to see him back in 6 months.

## 2023-03-20 NOTE — TELEPHONE ENCOUNTER
Verified patient with two types of identifiers. Patient is concerned with HTN, BP readings  12-25 pm 163/95  12-26 am 164/96 Pm 178/96  12/27 @ 7 am 170/99   @ 9 am 143/92  Pt c/o intermittent CP and tightness with rest and walking  Confirmed pt taking Imdur 60 mg, daily. Patient wanting to know if something for BP should be added? Will ask MD and return call to pt with recommendations. Ana Cristina Huffman)  Obstetrics and Gynecology  [address], [address]  [place] [address]  Phone: [phone]  Fax: [phone]  Follow Up Time: 1-3 Days

## 2024-02-06 ENCOUNTER — ANCILLARY PROCEDURE (OUTPATIENT)
Age: 79
End: 2024-02-06
Payer: MEDICARE

## 2024-02-06 ENCOUNTER — TELEPHONE (OUTPATIENT)
Age: 79
End: 2024-02-06

## 2024-02-06 VITALS
DIASTOLIC BLOOD PRESSURE: 60 MMHG | SYSTOLIC BLOOD PRESSURE: 120 MMHG | BODY MASS INDEX: 25.45 KG/M2 | HEIGHT: 73 IN | HEART RATE: 85 BPM | WEIGHT: 192 LBS

## 2024-02-06 DIAGNOSIS — Z95.5 PRESENCE OF CORONARY ANGIOPLASTY IMPLANT AND GRAFT: ICD-10-CM

## 2024-02-06 DIAGNOSIS — I25.119 ATHEROSCLEROSIS OF NATIVE CORONARY ARTERY OF NATIVE HEART WITH ANGINA PECTORIS (HCC): ICD-10-CM

## 2024-02-06 DIAGNOSIS — E11.9 TYPE 2 DIABETES MELLITUS WITHOUT COMPLICATION, WITH LONG-TERM CURRENT USE OF INSULIN (HCC): ICD-10-CM

## 2024-02-06 DIAGNOSIS — R07.9 CHEST PAIN, UNSPECIFIED TYPE: ICD-10-CM

## 2024-02-06 DIAGNOSIS — E78.00 PURE HYPERCHOLESTEROLEMIA, UNSPECIFIED: ICD-10-CM

## 2024-02-06 DIAGNOSIS — R06.02 SOB (SHORTNESS OF BREATH): ICD-10-CM

## 2024-02-06 DIAGNOSIS — Z79.4 TYPE 2 DIABETES MELLITUS WITHOUT COMPLICATION, WITH LONG-TERM CURRENT USE OF INSULIN (HCC): ICD-10-CM

## 2024-02-06 LAB
ECHO BSA: 2.12 M2
STRESS ANGINA INDEX: 0
STRESS BASELINE DIAS BP: 60 MMHG
STRESS BASELINE HR: 83 BPM
STRESS BASELINE ST DEPRESSION: 0 MM
STRESS BASELINE SYS BP: 120 MMHG
STRESS ESTIMATED WORKLOAD: 5.6 METS
STRESS EXERCISE DUR MIN: 3 MIN
STRESS EXERCISE DUR SEC: 27 SEC
STRESS O2 SAT PEAK: 98 %
STRESS O2 SAT REST: 96 %
STRESS PEAK DIAS BP: 60 MMHG
STRESS PEAK SYS BP: 190 MMHG
STRESS PERCENT HR ACHIEVED: 84 %
STRESS POST PEAK HR: 120 BPM
STRESS RATE PRESSURE PRODUCT: NORMAL BPM*MMHG
STRESS SR DUKE TREADMILL SCORE: 3
STRESS ST DEPRESSION: 0 MM
STRESS TARGET HR: 143 BPM

## 2024-02-06 PROCEDURE — 93351 STRESS TTE COMPLETE: CPT | Performed by: SPECIALIST

## 2024-02-07 ENCOUNTER — PATIENT MESSAGE (OUTPATIENT)
Age: 79
End: 2024-02-07

## 2024-02-14 NOTE — TELEPHONE ENCOUNTER
Called pt again. LM on VM. Stated everything looked okay and he can call back with any questions.      I looked and see he read [employer] message regarding normal stress test results.          Future Appointments   Date Time Provider Department Center   3/21/2024  9:20 AM Alo Marx MD [facility]

## 2024-03-21 ENCOUNTER — OFFICE VISIT (OUTPATIENT)
Age: 79
End: 2024-03-21
Payer: MEDICARE

## 2024-03-21 VITALS
OXYGEN SATURATION: 97 % | HEIGHT: 73 IN | WEIGHT: 180 LBS | BODY MASS INDEX: 23.86 KG/M2 | DIASTOLIC BLOOD PRESSURE: 40 MMHG | SYSTOLIC BLOOD PRESSURE: 80 MMHG | HEART RATE: 71 BPM

## 2024-03-21 DIAGNOSIS — J90 PLEURAL EFFUSION ON LEFT: ICD-10-CM

## 2024-03-21 DIAGNOSIS — I25.10 ATHEROSCLEROSIS OF NATIVE CORONARY ARTERY OF NATIVE HEART WITHOUT ANGINA PECTORIS: ICD-10-CM

## 2024-03-21 DIAGNOSIS — E11.9 TYPE 2 DIABETES MELLITUS WITHOUT COMPLICATION, WITH LONG-TERM CURRENT USE OF INSULIN (HCC): ICD-10-CM

## 2024-03-21 DIAGNOSIS — I10 ESSENTIAL (PRIMARY) HYPERTENSION: ICD-10-CM

## 2024-03-21 DIAGNOSIS — Z09 HOSPITAL DISCHARGE FOLLOW-UP: Primary | ICD-10-CM

## 2024-03-21 DIAGNOSIS — D50.0 IRON DEFICIENCY ANEMIA SECONDARY TO BLOOD LOSS (CHRONIC): ICD-10-CM

## 2024-03-21 DIAGNOSIS — E78.00 PURE HYPERCHOLESTEROLEMIA, UNSPECIFIED: ICD-10-CM

## 2024-03-21 DIAGNOSIS — Z79.4 TYPE 2 DIABETES MELLITUS WITHOUT COMPLICATION, WITH LONG-TERM CURRENT USE OF INSULIN (HCC): ICD-10-CM

## 2024-03-21 DIAGNOSIS — J18.9 PNEUMONIA OF LEFT LOWER LOBE DUE TO INFECTIOUS ORGANISM: ICD-10-CM

## 2024-03-21 DIAGNOSIS — K27.9 PEPTIC ULCER, SITE UNSPECIFIED, UNSPECIFIED AS ACUTE OR CHRONIC, WITHOUT HEMORRHAGE OR PERFORATION: ICD-10-CM

## 2024-03-21 PROCEDURE — 3074F SYST BP LT 130 MM HG: CPT | Performed by: SPECIALIST

## 2024-03-21 PROCEDURE — 3078F DIAST BP <80 MM HG: CPT | Performed by: SPECIALIST

## 2024-03-21 PROCEDURE — 1111F DSCHRG MED/CURRENT MED MERGE: CPT | Performed by: SPECIALIST

## 2024-03-21 PROCEDURE — 1036F TOBACCO NON-USER: CPT | Performed by: SPECIALIST

## 2024-03-21 PROCEDURE — 1123F ACP DISCUSS/DSCN MKR DOCD: CPT | Performed by: SPECIALIST

## 2024-03-21 PROCEDURE — G8427 DOCREV CUR MEDS BY ELIG CLIN: HCPCS | Performed by: SPECIALIST

## 2024-03-21 PROCEDURE — G8484 FLU IMMUNIZE NO ADMIN: HCPCS | Performed by: SPECIALIST

## 2024-03-21 PROCEDURE — G8420 CALC BMI NORM PARAMETERS: HCPCS | Performed by: SPECIALIST

## 2024-03-21 PROCEDURE — 99214 OFFICE O/P EST MOD 30 MIN: CPT | Performed by: SPECIALIST

## 2024-03-21 ASSESSMENT — PATIENT HEALTH QUESTIONNAIRE - PHQ9
SUM OF ALL RESPONSES TO PHQ QUESTIONS 1-9: 0
SUM OF ALL RESPONSES TO PHQ9 QUESTIONS 1 & 2: 0
2. FEELING DOWN, DEPRESSED OR HOPELESS: NOT AT ALL
1. LITTLE INTEREST OR PLEASURE IN DOING THINGS: NOT AT ALL

## 2024-03-21 ASSESSMENT — ENCOUNTER SYMPTOMS: SHORTNESS OF BREATH: 1

## 2024-03-21 NOTE — PROGRESS NOTES
Cardiovascular:  Positive for chest pain.   Respiratory:  Positive for shortness of breath.    All other systems reviewed and are negative.       BP (!) 80/40 (Site: Left Upper Arm, Position: Sitting, Cuff Size: Medium Adult)   Pulse 71   Ht 1.854 m (6' 1\")   Wt 81.6 kg (180 lb)   SpO2 97%   BMI 23.75 kg/m²    Physical Exam  Vitals and nursing note reviewed.   Constitutional:       Appearance: Normal appearance.   HENT:      Head: Normocephalic.      Right Ear: External ear normal.      Left Ear: External ear normal.      Nose: Nose normal.      Mouth/Throat:      Mouth: Mucous membranes are moist.   Eyes:      Extraocular Movements: Extraocular movements intact.   Cardiovascular:      Rate and Rhythm: Normal rate and regular rhythm.      Heart sounds: Normal heart sounds.   Pulmonary:      Effort: Pulmonary effort is normal.   Abdominal:      Palpations: Abdomen is soft.   Musculoskeletal:         General: Normal range of motion.      Cervical back: Normal range of motion.   Skin:     General: Skin is warm.   Neurological:      Mental Status: He is alert. Mental status is at baseline.   Psychiatric:         Behavior: Behavior normal.          ASSESSMENT and PLAN  1. Hospital discharge follow-up  -     [place] DISCHARGE MEDS RECONCILED W/ CURRENT OUTPATIENT MED LIST  2. Atherosclerosis of native coronary artery of native heart without angina pectoris  3. Type 2 diabetes mellitus without complication, with long-term current use of insulin (HCC)  4. Iron deficiency anemia secondary to blood loss (chronic)  5. Peptic ulcer, site unspecified, unspecified as acute or chronic, without hemorrhage or perforation  6. Essential (primary) hypertension  7. Pure hypercholesterolemia, unspecified  8. Pneumonia of left lower lobe due to infectious organism  9. Pleural effusion on left       He is gradually improving from a very severe pneumonia.  There was even mention of sepsis on his discharge summary but apparently no

## 2024-08-02 ENCOUNTER — OFFICE VISIT (OUTPATIENT)
Age: 79
End: 2024-08-02
Payer: MEDICARE

## 2024-08-02 VITALS
HEART RATE: 61 BPM | OXYGEN SATURATION: 95 % | HEIGHT: 73 IN | BODY MASS INDEX: 23.59 KG/M2 | DIASTOLIC BLOOD PRESSURE: 40 MMHG | WEIGHT: 178 LBS | SYSTOLIC BLOOD PRESSURE: 98 MMHG

## 2024-08-02 DIAGNOSIS — R07.9 CHEST PAIN, UNSPECIFIED TYPE: ICD-10-CM

## 2024-08-02 DIAGNOSIS — K27.9 PEPTIC ULCER, SITE UNSPECIFIED, UNSPECIFIED AS ACUTE OR CHRONIC, WITHOUT HEMORRHAGE OR PERFORATION: ICD-10-CM

## 2024-08-02 DIAGNOSIS — D50.0 IRON DEFICIENCY ANEMIA SECONDARY TO BLOOD LOSS (CHRONIC): ICD-10-CM

## 2024-08-02 DIAGNOSIS — I25.10 ATHEROSCLEROSIS OF NATIVE CORONARY ARTERY OF NATIVE HEART WITHOUT ANGINA PECTORIS: Primary | ICD-10-CM

## 2024-08-02 DIAGNOSIS — I10 ESSENTIAL (PRIMARY) HYPERTENSION: ICD-10-CM

## 2024-08-02 DIAGNOSIS — Z79.4 TYPE 2 DIABETES MELLITUS WITHOUT COMPLICATION, WITH LONG-TERM CURRENT USE OF INSULIN (HCC): ICD-10-CM

## 2024-08-02 DIAGNOSIS — E11.9 TYPE 2 DIABETES MELLITUS WITHOUT COMPLICATION, WITH LONG-TERM CURRENT USE OF INSULIN (HCC): ICD-10-CM

## 2024-08-02 DIAGNOSIS — Z95.5 PRESENCE OF CORONARY ANGIOPLASTY IMPLANT AND GRAFT: ICD-10-CM

## 2024-08-02 PROCEDURE — G8427 DOCREV CUR MEDS BY ELIG CLIN: HCPCS | Performed by: SPECIALIST

## 2024-08-02 PROCEDURE — 3074F SYST BP LT 130 MM HG: CPT | Performed by: SPECIALIST

## 2024-08-02 PROCEDURE — 99214 OFFICE O/P EST MOD 30 MIN: CPT | Performed by: SPECIALIST

## 2024-08-02 PROCEDURE — 1036F TOBACCO NON-USER: CPT | Performed by: SPECIALIST

## 2024-08-02 PROCEDURE — G8420 CALC BMI NORM PARAMETERS: HCPCS | Performed by: SPECIALIST

## 2024-08-02 PROCEDURE — 3078F DIAST BP <80 MM HG: CPT | Performed by: SPECIALIST

## 2024-08-02 PROCEDURE — 1123F ACP DISCUSS/DSCN MKR DOCD: CPT | Performed by: SPECIALIST

## 2024-08-02 ASSESSMENT — PATIENT HEALTH QUESTIONNAIRE - PHQ9
SUM OF ALL RESPONSES TO PHQ9 QUESTIONS 1 & 2: 0
SUM OF ALL RESPONSES TO PHQ QUESTIONS 1-9: 0
2. FEELING DOWN, DEPRESSED OR HOPELESS: NOT AT ALL
1. LITTLE INTEREST OR PLEASURE IN DOING THINGS: NOT AT ALL

## 2024-08-02 NOTE — PROGRESS NOTES
SubLINGual    NONFORMULARY Feroglobin and Blood builder    NOVOLIN 70/30 FLEXPEN (70-30) 100 UNIT/ML injection pen INJECT 30 UNITS IN THE MORNIGN AND 20 UNITS WITH DINNER    NOVOLOG MIX 70/30 FLEXPEN injection INJECT 24 UNITS SUBCUTANEOUSLY BEFORE BREAKFAST AND INJECT 12 UNITS BEFORE DINNER    omeprazole (PRILOSEC) 20 mg, Oral, DAILY    rosuvastatin (CRESTOR) 20 MG tablet 1 tablet, Oral, NIGHTLY    RYBELSUS 7 MG TABS 1 tablet, EVERY MORNING    tamsulosin (FLOMAX) 0.4 MG capsule TAKE 1 CAPSULE BY MOUTH EVERY NIGHT AS DIRECTED AT BEDTIME    vitamin D 1,000 Units, Oral, DAILY      Allergies   Allergen Reactions    Sulfa Antibiotics Hives     Past Medical History:   Diagnosis Date    Anemia     Dr Canas found ooze at site of Bilroth anastomosis, was on sucralfate, saw Dr Rodriguez    CAD in native artery 11/03/2017    admit [place], RCA 99% mid, PLB prox 90% collaterals from Circ    Cataract     Diabetes ([facility]) 11/03/2017    on metformin    Family history of coronary arteriosclerosis     Hemangioma     splenectomy-Fabian    Hypertension, essential 12/6/2017    NSTEMI (non-ST elevated myocardial infarction) ([facility]) 11/03/2017    troponin 0.67    PUD (peptic ulcer disease) 1977    Bilroth 2    S/p bare metal coronary artery stent 12/6/2017    S/P drug eluting coronary stent placement 3/25/2018    Statin intolerance 11/30/2017    declined Lipitor post BMS     Past Surgical History:   Procedure Laterality Date    OTHER SURGICAL HISTORY      splenectomy     Family History   Problem Relation Age of Onset    Heart Attack Father 70     Social History     Tobacco Use    Smoking status: Never     Passive exposure: Never    Smokeless tobacco: Never   Substance Use Topics    Alcohol use: Never       Review of Systems   Cardiovascular:  Positive for chest pain.   All other systems reviewed and are negative.       BP (!) 98/40 (Site: Left Upper Arm, Position: Sitting, Cuff Size: Medium Adult)   Pulse 61   Ht 1.854 m (6' 1\")   Wt 80.7

## 2025-01-31 ENCOUNTER — TELEPHONE (OUTPATIENT)
Age: 80
End: 2025-01-31

## 2025-03-18 NOTE — PROCEDURES
Cardiac Catheterization Procedure Note   Patient: Rai Anderson  MRN: [id number]  SSN: [id number]   YOB: 1945 Age: 71 y.o. Sex: male    Date of Procedure: 2/2/2018   Pre-procedure Diagnosis: Coronary Artery Disease  Post-procedure Diagnosis: Coronary Artery Disease  Procedure: Left Heart Cath and PCI  :  Dr. Faheem Carreno MD    Assistant(s):  None  Anesthesia: Moderate Sedation   Estimated Blood Loss: Less than 10 mL   Specimens Removed: None  Findings: Patent stent proximal RCA, long 95% lesion posterolateral branch of RCA treated with 2.5 x 38 [place] JOEL good result. No lesions LCA, LV normal.  angioseal to groin, will restart Plavix (not clear why he stopped med).   Complications: None   Implants:  None  Signed by:  Faheem Carreno MD  2/2/2018  12:08 PM
Chart reviewed, agree with LPN plan.      
Patient's INR is sub-therapeutic at 1.9. Patient confirms he followed previous instructions. Patient reports he ate peanut butter cookies this morning. Re-educated patient on Coumadin Diet. Advised patient of increased risk of clotting;sign/symptoms of clotting and need to go to ED if he experiences any. Patient report he is not having any signs/symptoms of clotting. Dosing/instructions given: Will give boosted dose of warfarin 7.5 mg today 3/18/25; then resume warfarin 5 mg on Thursdays, Sundays and Tuesdays; and 7.5 mg all other days. Recheck on 4/1/25. Calendar reviewed with patient. Patient verbalizes understanding.     
[id number]

## 2025-05-20 ENCOUNTER — OFFICE VISIT (OUTPATIENT)
Age: 80
End: 2025-05-20
Payer: MEDICARE

## 2025-05-20 VITALS
BODY MASS INDEX: 24.09 KG/M2 | SYSTOLIC BLOOD PRESSURE: 115 MMHG | HEIGHT: 73 IN | DIASTOLIC BLOOD PRESSURE: 58 MMHG | HEART RATE: 54 BPM | OXYGEN SATURATION: 96 % | WEIGHT: 181.8 LBS

## 2025-05-20 DIAGNOSIS — E78.2 MIXED HYPERLIPIDEMIA: ICD-10-CM

## 2025-05-20 DIAGNOSIS — I10 ESSENTIAL (PRIMARY) HYPERTENSION: ICD-10-CM

## 2025-05-20 DIAGNOSIS — I25.10 ATHEROSCLEROSIS OF NATIVE CORONARY ARTERY OF NATIVE HEART WITHOUT ANGINA PECTORIS: Primary | ICD-10-CM

## 2025-05-20 PROCEDURE — 93010 ELECTROCARDIOGRAM REPORT: CPT | Performed by: INTERNAL MEDICINE

## 2025-05-20 PROCEDURE — G2211 COMPLEX E/M VISIT ADD ON: HCPCS | Performed by: INTERNAL MEDICINE

## 2025-05-20 PROCEDURE — 1126F AMNT PAIN NOTED NONE PRSNT: CPT | Performed by: INTERNAL MEDICINE

## 2025-05-20 PROCEDURE — 1159F MED LIST DOCD IN RCRD: CPT | Performed by: INTERNAL MEDICINE

## 2025-05-20 PROCEDURE — 3078F DIAST BP <80 MM HG: CPT | Performed by: INTERNAL MEDICINE

## 2025-05-20 PROCEDURE — 99214 OFFICE O/P EST MOD 30 MIN: CPT | Performed by: INTERNAL MEDICINE

## 2025-05-20 PROCEDURE — 3074F SYST BP LT 130 MM HG: CPT | Performed by: INTERNAL MEDICINE

## 2025-05-20 PROCEDURE — 1123F ACP DISCUSS/DSCN MKR DOCD: CPT | Performed by: INTERNAL MEDICINE

## 2025-05-20 PROCEDURE — 93005 ELECTROCARDIOGRAM TRACING: CPT | Performed by: INTERNAL MEDICINE

## 2025-05-20 RX ORDER — EMPAGLIFLOZIN 10 MG/1
10 TABLET, FILM COATED ORAL EVERY MORNING
COMMUNITY
Start: 2025-05-14

## 2025-05-20 RX ORDER — INSULIN GLARGINE 100 [IU]/ML
INJECTION, SOLUTION SUBCUTANEOUS
COMMUNITY
Start: 2025-05-12

## 2025-05-20 ASSESSMENT — PATIENT HEALTH QUESTIONNAIRE - PHQ9
SUM OF ALL RESPONSES TO PHQ QUESTIONS 1-9: 0
1. LITTLE INTEREST OR PLEASURE IN DOING THINGS: NOT AT ALL
2. FEELING DOWN, DEPRESSED OR HOPELESS: NOT AT ALL

## 2025-05-20 NOTE — PROGRESS NOTES
1. Have you been to the ER, urgent care clinic since your last visit?  Hospitalized since your last visit?No    2. Have you seen or consulted any other health care providers outside of the [facility] since your last visit?  Include any pap smears or colon screening. No

## 2025-05-20 NOTE — PROGRESS NOTES
[place]: [place]  [phone]    HPI:  Mr. Edward is a 78 yo M seen in the past by Dr. Marx who recently retired with history of coronary artery disease status post stents to the RCA, history of splenectomy, bronchiectasis and pneumonia in the left with effusion in the past followed by Pulmonary, issues with his bladder, followed by Urology here for followup. From a symptom standpoint, he still does have occasional chest discomfort, can feel like a pressure sensation and he will take nitroglycerin from time to time.  He says he last took this a few months ago.  He is able to exercise and be active otherwise without restriction.  Over this past week he was working outside for 5-6 hours without restriction.  Sometimes when he lies on his left side he will have discomfort and we talked about that being noncardiac.  He used to walk more outside, but cannot go as far without having issues with his bladder.  He did have a stress test in 2024 that was normal.   He is compensated on exam with clear lungs and no lower extremity edema.  He is accompanied by his wife.  EKG was sinus bradycardia, heart rate of 53.    Assessment and Plan:  1. Coronary artery disease status post PCI to the RCA.  He is having occasional chest discomfort though this is mostly atypical, positional, nonexertional.  For now, I do think he can continue regular exercise and activity without restriction.  If he does take nitroglycerin recommended that he write these episodes down so he can keep us updated.  Of note, when Dr. 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  2. Essential hypertension.  Blood pressure is controlled.  3. Mixed hyperlipidemia.  Tolerating Crestor 20 mg.  Goal LDL less than 70.  4. Diabetes.

## 2025-08-28 ENCOUNTER — HOSPITAL ENCOUNTER (OUTPATIENT)
Facility: HOSPITAL | Age: 80
Discharge: HOME | End: 2025-08-31

## 2025-08-28 ENCOUNTER — OFFICE VISIT (OUTPATIENT)
Age: 80
End: 2025-08-28
Payer: MEDICARE

## 2025-08-28 VITALS
HEART RATE: 61 BPM | BODY MASS INDEX: 22.13 KG/M2 | DIASTOLIC BLOOD PRESSURE: 54 MMHG | HEIGHT: 73 IN | WEIGHT: 167 LBS | SYSTOLIC BLOOD PRESSURE: 116 MMHG

## 2025-08-28 DIAGNOSIS — D64.9 ANEMIA, UNSPECIFIED TYPE: ICD-10-CM

## 2025-08-28 DIAGNOSIS — R00.2 PALPITATIONS: Primary | ICD-10-CM

## 2025-08-28 DIAGNOSIS — I48.91 ATRIAL FIBRILLATION, UNSPECIFIED TYPE (HCC): ICD-10-CM

## 2025-08-28 DIAGNOSIS — Z79.4 TYPE 2 DIABETES MELLITUS WITHOUT COMPLICATION, WITH LONG-TERM CURRENT USE OF INSULIN (HCC): ICD-10-CM

## 2025-08-28 DIAGNOSIS — I25.10 CAD IN NATIVE ARTERY: ICD-10-CM

## 2025-08-28 DIAGNOSIS — I10 HYPERTENSION, ESSENTIAL: ICD-10-CM

## 2025-08-28 DIAGNOSIS — E11.9 TYPE 2 DIABETES MELLITUS WITHOUT COMPLICATION, WITH LONG-TERM CURRENT USE OF INSULIN (HCC): ICD-10-CM

## 2025-08-28 PROCEDURE — 93010 ELECTROCARDIOGRAM REPORT: CPT

## 2025-08-28 PROCEDURE — 99214 OFFICE O/P EST MOD 30 MIN: CPT

## 2025-08-28 PROCEDURE — 3074F SYST BP LT 130 MM HG: CPT

## 2025-08-28 PROCEDURE — 93005 ELECTROCARDIOGRAM TRACING: CPT

## 2025-08-28 PROCEDURE — 1123F ACP DISCUSS/DSCN MKR DOCD: CPT

## 2025-08-28 PROCEDURE — 3078F DIAST BP <80 MM HG: CPT

## 2025-08-28 RX ORDER — APIXABAN 5 MG/1
5 TABLET, FILM COATED ORAL 2 TIMES DAILY
COMMUNITY

## 2025-08-29 LAB
ERYTHROCYTE [DISTWIDTH] IN BLOOD BY AUTOMATED COUNT: 15.4 % (ref 11.6–15.4)
HCT VFR BLD AUTO: 32.8 % (ref 37.5–51)
HGB BLD-MCNC: 9.7 G/DL (ref 13–17.7)
MCH RBC QN AUTO: 26.8 PG (ref 26.6–33)
MCHC RBC AUTO-ENTMCNC: 29.6 G/DL (ref 31.5–35.7)
MCV RBC AUTO: 91 FL (ref 79–97)
PLATELET # BLD AUTO: 447 X10E3/UL (ref 150–450)
RBC # BLD AUTO: 3.62 X10E6/UL (ref 4.14–5.8)
WBC # BLD AUTO: 8.8 X10E3/UL (ref 3.4–10.8)

## (undated) DEVICE — KIT HND CTRL 3 W STPCOCK ROT END 54IN PREM HI PRSS TBNG AT

## (undated) DEVICE — KIT MED IMAG CNTRST AGNT W/ IOPAMIDOL REUSE

## (undated) DEVICE — ANGIOGRAPHIC CATHETER: Brand: IMPULSE™

## (undated) DEVICE — WASTE KIT - ST MARY: Brand: MEDLINE INDUSTRIES, INC.

## (undated) DEVICE — PACK PROCEDURE SURG HRT CATH

## (undated) DEVICE — ANGIOGRAPHY KIT

## (undated) DEVICE — COPILOT BLEEDBACK CONTROL VALVE: Brand: COPILOT

## (undated) DEVICE — SUT SLK 2-0SH 30IN BLK --

## (undated) DEVICE — AEGIS 1" DISK 4MM HOLE, PEEL OPEN: Brand: MEDLINE

## (undated) DEVICE — XIENCE SIERRA™ EVEROLIMUS ELUTING CORONARY STENT SYSTEM 3.50 MM X 23 MM / RAPID-EXCHANGE
Type: IMPLANTABLE DEVICE | Status: NON-FUNCTIONAL
Brand: XIENCE SIERRA™

## (undated) DEVICE — CUSTOM KT PTCA INFL DEV K05 00052M

## (undated) DEVICE — CATH ANGI BLLN DIL 4.0X12MM -- NC EUPHORA

## (undated) DEVICE — CATH ANGI BLLN DIL 3.5X12MM -- NC EUPHORA

## (undated) DEVICE — RUNTHROUGH NS EXTRA FLOPPY PTCA GUIDEWIRE: Brand: RUNTHROUGH

## (undated) DEVICE — PINNACLE INTRODUCER SHEATH: Brand: PINNACLE

## (undated) DEVICE — Device: Brand: EAGLE EYE PLATINUM RX DIGITAL IVUS CATHETER

## (undated) DEVICE — PRESSURE MONITORING SET: Brand: TRUWAVE

## (undated) DEVICE — KIT MFLD ISOLATN NACL CNTRST PRT TBNG SPIK W/ PRSS TRNSDUC

## (undated) DEVICE — MICROSURGICAL DILATATION DEVICE: Brand: WOLVERINE CORONARY CUTTING BALLOON

## (undated) DEVICE — CATH GUID COR JR4.0 6FR 100CM -- LAUNCHER